# Patient Record
Sex: MALE | Race: WHITE | NOT HISPANIC OR LATINO | Employment: OTHER | URBAN - METROPOLITAN AREA
[De-identification: names, ages, dates, MRNs, and addresses within clinical notes are randomized per-mention and may not be internally consistent; named-entity substitution may affect disease eponyms.]

---

## 2023-03-16 DIAGNOSIS — R52 PAIN: ICD-10-CM

## 2023-03-16 DIAGNOSIS — E78.5 HYPERLIPIDEMIA, UNSPECIFIED HYPERLIPIDEMIA TYPE: ICD-10-CM

## 2023-03-16 DIAGNOSIS — F32.A DEPRESSION, UNSPECIFIED DEPRESSION TYPE: ICD-10-CM

## 2023-03-16 DIAGNOSIS — E11.69 TYPE 2 DIABETES MELLITUS WITH OTHER SPECIFIED COMPLICATION, WITHOUT LONG-TERM CURRENT USE OF INSULIN (MULTI): Primary | ICD-10-CM

## 2023-03-16 RX ORDER — CELECOXIB 200 MG/1
200 CAPSULE ORAL 2 TIMES DAILY
Status: CANCELLED | OUTPATIENT
Start: 2023-03-16

## 2023-03-16 RX ORDER — CITALOPRAM 40 MG/1
40 TABLET, FILM COATED ORAL DAILY
COMMUNITY
Start: 2017-11-03 | End: 2023-03-16 | Stop reason: SDUPTHER

## 2023-03-16 RX ORDER — ATORVASTATIN CALCIUM 80 MG/1
80 TABLET, FILM COATED ORAL DAILY
COMMUNITY
Start: 2020-01-30 | End: 2023-03-16 | Stop reason: SDUPTHER

## 2023-03-16 RX ORDER — EMPAGLIFLOZIN 25 MG/1
25 TABLET, FILM COATED ORAL DAILY
COMMUNITY
Start: 2020-01-30 | End: 2023-03-16 | Stop reason: SDUPTHER

## 2023-03-16 RX ORDER — METFORMIN HYDROCHLORIDE 1000 MG/1
1000 TABLET ORAL
COMMUNITY
Start: 2020-01-21 | End: 2023-03-16 | Stop reason: SDUPTHER

## 2023-03-16 RX ORDER — ACARBOSE 25 MG/1
25 TABLET ORAL
COMMUNITY
Start: 2017-10-30 | End: 2023-03-16 | Stop reason: SDUPTHER

## 2023-03-16 RX ORDER — OMEGA-3/DHA/EPA/FISH OIL 60 MG-90MG
500 CAPSULE ORAL 2 TIMES DAILY
COMMUNITY
Start: 2023-01-18 | End: 2023-03-16 | Stop reason: SDUPTHER

## 2023-03-16 RX ORDER — CELECOXIB 200 MG/1
200 CAPSULE ORAL 2 TIMES DAILY
COMMUNITY
Start: 2022-03-28 | End: 2023-03-17

## 2023-03-17 RX ORDER — METFORMIN HYDROCHLORIDE 1000 MG/1
1000 TABLET ORAL
Qty: 180 TABLET | Refills: 0 | Status: SHIPPED | OUTPATIENT
Start: 2023-03-17 | End: 2023-06-20 | Stop reason: SDUPTHER

## 2023-03-17 RX ORDER — ACARBOSE 25 MG/1
25 TABLET ORAL
Qty: 270 TABLET | Refills: 0 | Status: SHIPPED | OUTPATIENT
Start: 2023-03-17 | End: 2023-06-20 | Stop reason: SDUPTHER

## 2023-03-17 RX ORDER — CITALOPRAM 40 MG/1
40 TABLET, FILM COATED ORAL DAILY
Qty: 90 TABLET | Refills: 0 | Status: SHIPPED | OUTPATIENT
Start: 2023-03-17 | End: 2023-06-20 | Stop reason: SDUPTHER

## 2023-03-17 RX ORDER — EMPAGLIFLOZIN 25 MG/1
25 TABLET, FILM COATED ORAL DAILY
Qty: 90 TABLET | Refills: 0 | Status: SHIPPED | OUTPATIENT
Start: 2023-03-17 | End: 2023-05-09

## 2023-03-17 RX ORDER — ATORVASTATIN CALCIUM 80 MG/1
80 TABLET, FILM COATED ORAL DAILY
Qty: 90 TABLET | Refills: 0 | Status: SHIPPED | OUTPATIENT
Start: 2023-03-17 | End: 2023-06-20 | Stop reason: SDUPTHER

## 2023-03-17 RX ORDER — OMEGA-3/DHA/EPA/FISH OIL 60 MG-90MG
500 CAPSULE ORAL 2 TIMES DAILY
Qty: 180 CAPSULE | Refills: 0 | Status: SHIPPED | OUTPATIENT
Start: 2023-03-17 | End: 2023-05-09

## 2023-03-17 RX ORDER — CELECOXIB 100 MG/1
200 CAPSULE ORAL 2 TIMES DAILY PRN
Qty: 60 CAPSULE | Refills: 1 | Status: SHIPPED | OUTPATIENT
Start: 2023-03-17 | End: 2023-04-16

## 2023-04-13 ENCOUNTER — HOSPITAL ENCOUNTER (OUTPATIENT)
Dept: DATA CONVERSION | Facility: HOSPITAL | Age: 58
End: 2023-04-13
Attending: PODIATRIST | Admitting: PODIATRIST
Payer: COMMERCIAL

## 2023-04-13 DIAGNOSIS — L03.119 CELLULITIS OF UNSPECIFIED PART OF LIMB: ICD-10-CM

## 2023-04-13 DIAGNOSIS — I11.9 HYPERTENSIVE HEART DISEASE WITHOUT HEART FAILURE: ICD-10-CM

## 2023-04-13 DIAGNOSIS — D75.1 SECONDARY POLYCYTHEMIA: ICD-10-CM

## 2023-04-13 DIAGNOSIS — Z86.718 PERSONAL HISTORY OF OTHER VENOUS THROMBOSIS AND EMBOLISM: ICD-10-CM

## 2023-04-13 DIAGNOSIS — I83.90 ASYMPTOMATIC VARICOSE VEINS OF UNSPECIFIED LOWER EXTREMITY: ICD-10-CM

## 2023-04-13 DIAGNOSIS — I44.1 ATRIOVENTRICULAR BLOCK, SECOND DEGREE: ICD-10-CM

## 2023-04-13 DIAGNOSIS — R35.0 FREQUENCY OF MICTURITION: ICD-10-CM

## 2023-04-13 DIAGNOSIS — N40.1 BENIGN PROSTATIC HYPERPLASIA WITH LOWER URINARY TRACT SYMPTOMS: ICD-10-CM

## 2023-04-13 DIAGNOSIS — F17.200 NICOTINE DEPENDENCE, UNSPECIFIED, UNCOMPLICATED: ICD-10-CM

## 2023-04-13 DIAGNOSIS — E11.40 TYPE 2 DIABETES MELLITUS WITH DIABETIC NEUROPATHY, UNSPECIFIED (MULTI): ICD-10-CM

## 2023-04-13 DIAGNOSIS — R91.8 OTHER NONSPECIFIC ABNORMAL FINDING OF LUNG FIELD: ICD-10-CM

## 2023-04-13 DIAGNOSIS — J43.9 EMPHYSEMA, UNSPECIFIED (MULTI): ICD-10-CM

## 2023-04-13 DIAGNOSIS — Z86.14 PERSONAL HISTORY OF METHICILLIN RESISTANT STAPHYLOCOCCUS AUREUS INFECTION: ICD-10-CM

## 2023-04-13 DIAGNOSIS — Z79.82 LONG TERM (CURRENT) USE OF ASPIRIN: ICD-10-CM

## 2023-04-13 DIAGNOSIS — E66.9 OBESITY, UNSPECIFIED: ICD-10-CM

## 2023-04-13 DIAGNOSIS — I25.10 ATHEROSCLEROTIC HEART DISEASE OF NATIVE CORONARY ARTERY WITHOUT ANGINA PECTORIS: ICD-10-CM

## 2023-04-13 DIAGNOSIS — I49.9 CARDIAC ARRHYTHMIA, UNSPECIFIED: ICD-10-CM

## 2023-04-13 DIAGNOSIS — G47.33 OBSTRUCTIVE SLEEP APNEA (ADULT) (PEDIATRIC): ICD-10-CM

## 2023-04-13 DIAGNOSIS — S91.109A UNSPECIFIED OPEN WOUND OF UNSPECIFIED TOE(S) WITHOUT DAMAGE TO NAIL, INITIAL ENCOUNTER: ICD-10-CM

## 2023-04-13 DIAGNOSIS — I25.2 OLD MYOCARDIAL INFARCTION: ICD-10-CM

## 2023-04-13 DIAGNOSIS — Z95.810 PRESENCE OF AUTOMATIC (IMPLANTABLE) CARDIAC DEFIBRILLATOR: ICD-10-CM

## 2023-04-13 DIAGNOSIS — L03.115 CELLULITIS OF RIGHT LOWER LIMB: ICD-10-CM

## 2023-04-13 DIAGNOSIS — Z79.84 LONG TERM (CURRENT) USE OF ORAL HYPOGLYCEMIC DRUGS: ICD-10-CM

## 2023-04-13 DIAGNOSIS — I44.2 ATRIOVENTRICULAR BLOCK, COMPLETE (MULTI): ICD-10-CM

## 2023-04-13 DIAGNOSIS — F32.A DEPRESSION, UNSPECIFIED: ICD-10-CM

## 2023-04-13 DIAGNOSIS — I73.9 PERIPHERAL VASCULAR DISEASE, UNSPECIFIED (CMS-HCC): ICD-10-CM

## 2023-04-13 DIAGNOSIS — E11.65 TYPE 2 DIABETES MELLITUS WITH HYPERGLYCEMIA (MULTI): ICD-10-CM

## 2023-04-13 DIAGNOSIS — I45.10 UNSPECIFIED RIGHT BUNDLE-BRANCH BLOCK: ICD-10-CM

## 2023-04-13 LAB
ANION GAP IN SER/PLAS: 9 MMOL/L (ref 10–20)
ATRIAL RATE: 68 BPM
CALCIUM (MG/DL) IN SER/PLAS: 9.5 MG/DL (ref 8.6–10.3)
CARBON DIOXIDE, TOTAL (MMOL/L) IN SER/PLAS: 32 MMOL/L (ref 21–32)
CHLORIDE (MMOL/L) IN SER/PLAS: 104 MMOL/L (ref 98–107)
CREATININE (MG/DL) IN SER/PLAS: 0.65 MG/DL (ref 0.5–1.3)
GFR MALE: >90 ML/MIN/1.73M2
GLUCOSE (MG/DL) IN SER/PLAS: 125 MG/DL (ref 74–99)
P AXIS: 76 DEGREES
POCT GLUCOSE: 124 MG/DL (ref 74–99)
POTASSIUM (MMOL/L) IN SER/PLAS: 4.7 MMOL/L (ref 3.5–5.3)
PR INTERVAL: 243 MS
Q ONSET: 251 MS
QRS COUNT: 11 BEATS
QRS DURATION: 164 MS
QT INTERVAL: 463 MS
QTC CALCULATION(BAZETT): 493 MS
QTC FREDERICIA: 483 MS
R AXIS: 266 DEGREES
SODIUM (MMOL/L) IN SER/PLAS: 140 MMOL/L (ref 136–145)
T AXIS: 61 DEGREES
T OFFSET: 482 MS
UREA NITROGEN (MG/DL) IN SER/PLAS: 14 MG/DL (ref 6–23)
VENTRICULAR RATE: 68 BPM

## 2023-05-09 ENCOUNTER — LAB (OUTPATIENT)
Dept: LAB | Facility: LAB | Age: 58
End: 2023-05-09
Payer: COMMERCIAL

## 2023-05-09 ENCOUNTER — OFFICE VISIT (OUTPATIENT)
Dept: PRIMARY CARE | Facility: CLINIC | Age: 58
End: 2023-05-09
Payer: COMMERCIAL

## 2023-05-09 VITALS
HEART RATE: 78 BPM | DIASTOLIC BLOOD PRESSURE: 80 MMHG | OXYGEN SATURATION: 93 % | SYSTOLIC BLOOD PRESSURE: 126 MMHG | TEMPERATURE: 97.4 F | BODY MASS INDEX: 47.92 KG/M2 | WEIGHT: 315 LBS

## 2023-05-09 DIAGNOSIS — R52 BODY ACHES: ICD-10-CM

## 2023-05-09 DIAGNOSIS — R53.83 OTHER FATIGUE: ICD-10-CM

## 2023-05-09 DIAGNOSIS — M25.50 ARTHRALGIA, UNSPECIFIED JOINT: ICD-10-CM

## 2023-05-09 DIAGNOSIS — R60.9 EDEMA, UNSPECIFIED TYPE: Primary | ICD-10-CM

## 2023-05-09 DIAGNOSIS — R60.9 EDEMA, UNSPECIFIED TYPE: ICD-10-CM

## 2023-05-09 DIAGNOSIS — M79.605 PAIN OF LEFT LOWER EXTREMITY: ICD-10-CM

## 2023-05-09 PROBLEM — J30.9 ALLERGIC RHINITIS: Status: ACTIVE | Noted: 2023-05-09

## 2023-05-09 PROBLEM — F32.A DEPRESSION: Status: ACTIVE | Noted: 2023-05-09

## 2023-05-09 PROBLEM — H81.10 BENIGN PAROXYSMAL POSITIONAL VERTIGO: Status: ACTIVE | Noted: 2023-05-09

## 2023-05-09 PROBLEM — I51.7 LEFT VENTRICULAR HYPERTROPHY: Status: ACTIVE | Noted: 2023-05-09

## 2023-05-09 PROBLEM — R91.8 MULTIPLE PULMONARY NODULES: Status: ACTIVE | Noted: 2023-05-09

## 2023-05-09 PROBLEM — G47.33 OBSTRUCTIVE SLEEP APNEA: Status: ACTIVE | Noted: 2022-10-12

## 2023-05-09 PROBLEM — H81.399 OTHER PERIPHERAL VERTIGO, UNSPECIFIED EAR: Status: ACTIVE | Noted: 2023-05-09

## 2023-05-09 PROBLEM — E66.01 MORBID OBESITY (MULTI): Status: ACTIVE | Noted: 2023-05-09

## 2023-05-09 PROBLEM — E11.9 TYPE 2 DIABETES MELLITUS (MULTI): Status: ACTIVE | Noted: 2020-07-06

## 2023-05-09 PROBLEM — I10 ESSENTIAL HYPERTENSION: Status: ACTIVE | Noted: 2020-07-06

## 2023-05-09 PROBLEM — H90.3 SENSORINEURAL HEARING LOSS, ASYMMETRICAL: Status: ACTIVE | Noted: 2023-05-09

## 2023-05-09 PROBLEM — M19.90 OSTEOARTHRITIS: Status: ACTIVE | Noted: 2023-05-09

## 2023-05-09 PROBLEM — E78.5 HYPERLIPIDEMIA: Status: ACTIVE | Noted: 2020-07-06

## 2023-05-09 PROBLEM — R42 VERTIGO: Status: ACTIVE | Noted: 2023-05-09

## 2023-05-09 PROBLEM — Z95.0 STATUS CARDIAC PACEMAKER: Status: ACTIVE | Noted: 2023-05-09

## 2023-05-09 PROBLEM — R06.2 WHEEZING ON AUSCULTATION: Status: ACTIVE | Noted: 2023-05-09

## 2023-05-09 PROBLEM — J44.9 COPD (CHRONIC OBSTRUCTIVE PULMONARY DISEASE) (MULTI): Status: ACTIVE | Noted: 2022-06-27

## 2023-05-09 PROBLEM — F17.200 MODERATE TOBACCO USE DISORDER: Status: ACTIVE | Noted: 2023-05-09

## 2023-05-09 PROBLEM — F41.1 ANXIETY STATE: Status: ACTIVE | Noted: 2021-10-25

## 2023-05-09 PROBLEM — M79.89 LEG SWELLING: Status: ACTIVE | Noted: 2023-05-09

## 2023-05-09 PROBLEM — R35.1 NOCTURIA: Status: ACTIVE | Noted: 2023-05-09

## 2023-05-09 LAB
ALANINE AMINOTRANSFERASE (SGPT) (U/L) IN SER/PLAS: 26 U/L (ref 10–52)
ALBUMIN (G/DL) IN SER/PLAS: 4.3 G/DL (ref 3.4–5)
ALKALINE PHOSPHATASE (U/L) IN SER/PLAS: 110 U/L (ref 33–120)
ANION GAP IN SER/PLAS: 10 MMOL/L (ref 10–20)
ASPARTATE AMINOTRANSFERASE (SGOT) (U/L) IN SER/PLAS: 17 U/L (ref 9–39)
BASOPHILS (10*3/UL) IN BLOOD BY AUTOMATED COUNT: 0.05 X10E9/L (ref 0–0.1)
BASOPHILS/100 LEUKOCYTES IN BLOOD BY AUTOMATED COUNT: 0.6 % (ref 0–2)
BILIRUBIN TOTAL (MG/DL) IN SER/PLAS: 1.3 MG/DL (ref 0–1.2)
CALCIUM (MG/DL) IN SER/PLAS: 9.8 MG/DL (ref 8.6–10.3)
CARBON DIOXIDE, TOTAL (MMOL/L) IN SER/PLAS: 33 MMOL/L (ref 21–32)
CHLORIDE (MMOL/L) IN SER/PLAS: 101 MMOL/L (ref 98–107)
CREATINE KINASE (U/L) IN SER/PLAS: 100 U/L (ref 0–325)
CREATININE (MG/DL) IN SER/PLAS: 0.69 MG/DL (ref 0.5–1.3)
EOSINOPHILS (10*3/UL) IN BLOOD BY AUTOMATED COUNT: 0.21 X10E9/L (ref 0–0.7)
EOSINOPHILS/100 LEUKOCYTES IN BLOOD BY AUTOMATED COUNT: 2.6 % (ref 0–6)
ERYTHROCYTE DISTRIBUTION WIDTH (RATIO) BY AUTOMATED COUNT: 15.7 % (ref 11.5–14.5)
ERYTHROCYTE MEAN CORPUSCULAR HEMOGLOBIN CONCENTRATION (G/DL) BY AUTOMATED: 31.7 G/DL (ref 32–36)
ERYTHROCYTE MEAN CORPUSCULAR VOLUME (FL) BY AUTOMATED COUNT: 91 FL (ref 80–100)
ERYTHROCYTES (10*6/UL) IN BLOOD BY AUTOMATED COUNT: 5.83 X10E12/L (ref 4.5–5.9)
GFR MALE: >90 ML/MIN/1.73M2
GLUCOSE (MG/DL) IN SER/PLAS: 120 MG/DL (ref 74–99)
HEMATOCRIT (%) IN BLOOD BY AUTOMATED COUNT: 53.3 % (ref 41–52)
HEMOGLOBIN (G/DL) IN BLOOD: 16.9 G/DL (ref 13.5–17.5)
IMMATURE GRANULOCYTES/100 LEUKOCYTES IN BLOOD BY AUTOMATED COUNT: 0.4 % (ref 0–0.9)
LEUKOCYTES (10*3/UL) IN BLOOD BY AUTOMATED COUNT: 8.2 X10E9/L (ref 4.4–11.3)
LYMPHOCYTES (10*3/UL) IN BLOOD BY AUTOMATED COUNT: 1.09 X10E9/L (ref 1.2–4.8)
LYMPHOCYTES/100 LEUKOCYTES IN BLOOD BY AUTOMATED COUNT: 13.3 % (ref 13–44)
MONOCYTES (10*3/UL) IN BLOOD BY AUTOMATED COUNT: 0.81 X10E9/L (ref 0.1–1)
MONOCYTES/100 LEUKOCYTES IN BLOOD BY AUTOMATED COUNT: 9.9 % (ref 2–10)
NEUTROPHILS (10*3/UL) IN BLOOD BY AUTOMATED COUNT: 6.03 X10E9/L (ref 1.2–7.7)
NEUTROPHILS/100 LEUKOCYTES IN BLOOD BY AUTOMATED COUNT: 73.2 % (ref 40–80)
PLATELETS (10*3/UL) IN BLOOD AUTOMATED COUNT: 168 X10E9/L (ref 150–450)
POTASSIUM (MMOL/L) IN SER/PLAS: 4.3 MMOL/L (ref 3.5–5.3)
PROSTATE SPECIFIC AG (NG/ML) IN SER/PLAS: 0.22 NG/ML (ref 0–4)
PROTEIN TOTAL: 7.3 G/DL (ref 6.4–8.2)
RHEUMATOID FACTOR (IU/ML) IN SERUM OR PLASMA: <10 IU/ML (ref 0–15)
SEDIMENTATION RATE, ERYTHROCYTE: 15 MM/H (ref 0–20)
SODIUM (MMOL/L) IN SER/PLAS: 140 MMOL/L (ref 136–145)
THYROTROPIN (MIU/L) IN SER/PLAS BY DETECTION LIMIT <= 0.05 MIU/L: 3.23 MIU/L (ref 0.44–3.98)
URATE (MG/DL) IN SER/PLAS: 5.8 MG/DL (ref 4–7.5)
UREA NITROGEN (MG/DL) IN SER/PLAS: 16 MG/DL (ref 6–23)

## 2023-05-09 PROCEDURE — 4010F ACE/ARB THERAPY RXD/TAKEN: CPT | Performed by: FAMILY MEDICINE

## 2023-05-09 PROCEDURE — 86431 RHEUMATOID FACTOR QUANT: CPT

## 2023-05-09 PROCEDURE — 86038 ANTINUCLEAR ANTIBODIES: CPT

## 2023-05-09 PROCEDURE — 82607 VITAMIN B-12: CPT

## 2023-05-09 PROCEDURE — 84443 ASSAY THYROID STIM HORMONE: CPT

## 2023-05-09 PROCEDURE — 82550 ASSAY OF CK (CPK): CPT

## 2023-05-09 PROCEDURE — 3079F DIAST BP 80-89 MM HG: CPT | Performed by: FAMILY MEDICINE

## 2023-05-09 PROCEDURE — 86235 NUCLEAR ANTIGEN ANTIBODY: CPT

## 2023-05-09 PROCEDURE — 36415 COLL VENOUS BLD VENIPUNCTURE: CPT

## 2023-05-09 PROCEDURE — 3008F BODY MASS INDEX DOCD: CPT | Performed by: FAMILY MEDICINE

## 2023-05-09 PROCEDURE — 99214 OFFICE O/P EST MOD 30 MIN: CPT | Performed by: FAMILY MEDICINE

## 2023-05-09 PROCEDURE — 3074F SYST BP LT 130 MM HG: CPT | Performed by: FAMILY MEDICINE

## 2023-05-09 PROCEDURE — 86039 ANTINUCLEAR ANTIBODIES (ANA): CPT

## 2023-05-09 PROCEDURE — 86225 DNA ANTIBODY NATIVE: CPT

## 2023-05-09 RX ORDER — OXYCODONE AND ACETAMINOPHEN 5; 325 MG/1; MG/1
TABLET ORAL
COMMUNITY
Start: 2023-04-13 | End: 2023-11-06 | Stop reason: ALTCHOICE

## 2023-05-09 RX ORDER — GLIPIZIDE 5 MG/1
TABLET ORAL
COMMUNITY
Start: 2020-01-30 | End: 2023-05-24 | Stop reason: SDUPTHER

## 2023-05-09 RX ORDER — LISINOPRIL 10 MG/1
1 TABLET ORAL DAILY
COMMUNITY
Start: 2017-10-30 | End: 2023-11-02 | Stop reason: WASHOUT

## 2023-05-09 RX ORDER — CEFADROXIL 500 MG/1
500 CAPSULE ORAL EVERY 12 HOURS
COMMUNITY
Start: 2023-05-04 | End: 2023-06-20 | Stop reason: ALTCHOICE

## 2023-05-09 RX ORDER — ALBUTEROL SULFATE 90 UG/1
AEROSOL, METERED RESPIRATORY (INHALATION)
COMMUNITY
Start: 2022-06-27 | End: 2023-11-02 | Stop reason: WASHOUT

## 2023-05-09 RX ORDER — ASPIRIN 81 MG/1
1 TABLET ORAL DAILY
COMMUNITY
Start: 2020-04-28 | End: 2023-11-02 | Stop reason: WASHOUT

## 2023-05-09 RX ORDER — SEMAGLUTIDE 1.34 MG/ML
INJECTION, SOLUTION SUBCUTANEOUS
COMMUNITY
Start: 2023-04-15 | End: 2023-07-03 | Stop reason: SDUPTHER

## 2023-05-09 RX ORDER — MECLIZINE HYDROCHLORIDE 25 MG/1
TABLET ORAL
COMMUNITY
Start: 2022-08-08 | End: 2023-06-20

## 2023-05-09 RX ORDER — DOCUSATE SODIUM 100 MG/1
CAPSULE, LIQUID FILLED ORAL
COMMUNITY
Start: 2022-12-02 | End: 2023-11-02 | Stop reason: WASHOUT

## 2023-05-09 RX ORDER — CELECOXIB 100 MG/1
CAPSULE ORAL
COMMUNITY
Start: 2023-04-17 | End: 2023-05-24 | Stop reason: SDUPTHER

## 2023-05-09 RX ORDER — TIOTROPIUM BROMIDE AND OLODATEROL 3.124; 2.736 UG/1; UG/1
SPRAY, METERED RESPIRATORY (INHALATION)
COMMUNITY
Start: 2020-05-18 | End: 2024-03-22 | Stop reason: WASHOUT

## 2023-05-09 RX ORDER — AMMONIUM LACTATE 12 G/100G
LOTION TOPICAL
COMMUNITY
Start: 2023-04-17

## 2023-05-09 NOTE — PROGRESS NOTES
Subjective   Patient ID: Morro Seo is a 57 y.o. male who presents for Leg Swelling (Bilateral leg swelling and pain) and Joint Swelling (All over body x 5 months).    HPI   Edema: Chronic and recurrent.  Worsening since had foot surgery on the right.  Denies salt intake.  Worse on the left leg.  No recent travel.  On antibiotics for possible cellulitis.    Joint pains/body aches: Chronic and recurrent.  As needed OTC meds minimally helping. Has bw ordered from podiatry    Fatigue: Chronically has no energy      Review of Systems   Constitutional:  Positive for fatigue.   HENT: Negative.     Eyes:  Negative for visual disturbance.   Respiratory:  Negative for shortness of breath.    Cardiovascular:  Negative for chest pain and palpitations.   Gastrointestinal:  Negative for abdominal pain, blood in stool, constipation, diarrhea, nausea and vomiting.   Endocrine: Negative for polydipsia, polyphagia and polyuria.   Genitourinary:  Negative for difficulty urinating and dysuria.   Musculoskeletal:         As above   Skin:  Negative for rash.   Neurological:  Positive for dizziness. Negative for headaches.   Psychiatric/Behavioral:  Negative for dysphoric mood and sleep disturbance.        Objective   /80   Pulse 78   Temp 36.3 °C (97.4 °F)   Wt (!) 152 kg (334 lb)   SpO2 93%   BMI 47.92 kg/m²     Physical Exam  Vitals and nursing note reviewed.   Constitutional:       General: He is not in acute distress.     Appearance: Normal appearance. He is not toxic-appearing or diaphoretic.   HENT:      Head: Normocephalic.   Eyes:      General: No scleral icterus.     Pupils: Pupils are equal, round, and reactive to light.   Cardiovascular:      Rate and Rhythm: Normal rate and regular rhythm.      Pulses: Normal pulses.      Heart sounds: No murmur heard.  Pulmonary:      Effort: Pulmonary effort is normal. No respiratory distress.      Breath sounds: Normal breath sounds.   Abdominal:      General: Bowel sounds  are normal.      Palpations: Abdomen is soft.      Tenderness: There is no abdominal tenderness. There is no guarding.   Musculoskeletal:      Cervical back: Neck supple.      Right lower leg: Edema present.      Left lower leg: Edema present.      Comments: Edema worse left leg   Skin:     General: Skin is warm.   Neurological:      General: No focal deficit present.      Mental Status: He is alert.      Cranial Nerves: No cranial nerve deficit.   Psychiatric:         Mood and Affect: Mood normal.         Assessment/Plan   Problem List Items Addressed This Visit    None  Visit Diagnoses       Edema, unspecified type    -  Primary    Relevant Orders    TSH with reflex to Free T4 if abnormal (Completed)    Lower extremity venous duplex left    Body aches        Relevant Orders    CK (Completed)    Rheumatoid factor (Completed)    KJ with Reflex to LIZZY    Arthralgia, unspecified joint        Relevant Orders    Rheumatoid factor (Completed)    KJ with Reflex to LIZZY    Other fatigue        Relevant Orders    TSH with reflex to Free T4 if abnormal (Completed)    Vitamin B12    Pain of left lower extremity        Relevant Orders    Lower extremity venous duplex left

## 2023-05-09 NOTE — PATIENT INSTRUCTIONS
You were referred for US of your left leg and blood work. Obtain your blood work from the podiatrist    Go to the ER if any of your symptoms are significantly worsening.     Recommend low salt diet.     Finish your antibiotics.    Contact your cardiologist to see if you need repeat echo or water pill    Follow up as scheduled, sooner if needed

## 2023-05-10 ENCOUNTER — TELEPHONE (OUTPATIENT)
Dept: PRIMARY CARE | Facility: CLINIC | Age: 58
End: 2023-05-10
Payer: COMMERCIAL

## 2023-05-10 LAB
COBALAMIN (VITAMIN B12) (PG/ML) IN SER/PLAS: 512 PG/ML (ref 211–911)
ESTIMATED AVERAGE GLUCOSE FOR HBA1C: 166 MG/DL
HEMOGLOBIN A1C/HEMOGLOBIN TOTAL IN BLOOD: 7.4 %
NATRIURETIC PEPTIDE B (PG/ML) IN SER/PLAS: 26 PG/ML (ref 0–99)

## 2023-05-10 ASSESSMENT — ENCOUNTER SYMPTOMS
VOMITING: 0
DIFFICULTY URINATING: 0
DIARRHEA: 0
SHORTNESS OF BREATH: 0
NAUSEA: 0
HEADACHES: 0
FATIGUE: 1
POLYPHAGIA: 0
POLYDIPSIA: 0
PALPITATIONS: 0
SLEEP DISTURBANCE: 0
DYSPHORIC MOOD: 0
ABDOMINAL PAIN: 0
CONSTIPATION: 0
DYSURIA: 0
DIZZINESS: 1
BLOOD IN STOOL: 0

## 2023-05-10 NOTE — TELEPHONE ENCOUNTER
----- Message from Lisseth Thomas DO sent at 5/10/2023 12:12 AM EDT -----  Pls tell pt that his venous duplex was neg

## 2023-05-11 LAB
ANA PATTERN: ABNORMAL
ANA TITER: ABNORMAL
ANTI-NUCLEAR ANTIBODY (ANA): POSITIVE

## 2023-05-12 LAB
ANTI-CENTROMERE: <0.2 AI
ANTI-CHROMATIN: <0.2 AI
ANTI-DNA (DS): <1 IU/ML
ANTI-JO-1 IGG: <0.2 AI
ANTI-RIBOSOMAL P: <0.2 AI
ANTI-RNP: <0.2 AI
ANTI-SCL-70: <0.2 AI
ANTI-SM/RNP: <0.2 AI
ANTI-SM: <0.2 AI
ANTI-SSA: <0.2 AI
ANTI-SSB: <0.2 AI

## 2023-05-20 DIAGNOSIS — R52 PAIN, UNSPECIFIED: ICD-10-CM

## 2023-05-22 RX ORDER — CELECOXIB 100 MG/1
CAPSULE ORAL
Qty: 60 CAPSULE | Refills: 1 | OUTPATIENT
Start: 2023-05-22

## 2023-05-24 DIAGNOSIS — M19.90 OSTEOARTHRITIS, UNSPECIFIED OSTEOARTHRITIS TYPE, UNSPECIFIED SITE: Primary | ICD-10-CM

## 2023-05-24 DIAGNOSIS — E11.69 TYPE 2 DIABETES MELLITUS WITH OTHER SPECIFIED COMPLICATION, UNSPECIFIED WHETHER LONG TERM INSULIN USE (MULTI): ICD-10-CM

## 2023-05-24 NOTE — TELEPHONE ENCOUNTER
Next ov 6/12  Pt compliant  Pt OUT  [facility]    Last rx for celbrex sent 10/3/22 via [facility] for 6mo  Last rx for glipizide sent 6/27/22 via [facility] for 6 mo

## 2023-05-25 LAB
ALANINE AMINOTRANSFERASE (SGPT) (U/L) IN SER/PLAS: 35 U/L (ref 10–52)
ALBUMIN (G/DL) IN SER/PLAS: 4.5 G/DL (ref 3.4–5)
ALKALINE PHOSPHATASE (U/L) IN SER/PLAS: 114 U/L (ref 33–120)
ANION GAP IN SER/PLAS: 12 MMOL/L (ref 10–20)
ASPARTATE AMINOTRANSFERASE (SGOT) (U/L) IN SER/PLAS: 25 U/L (ref 9–39)
BILIRUBIN TOTAL (MG/DL) IN SER/PLAS: 1.4 MG/DL (ref 0–1.2)
CALCIUM (MG/DL) IN SER/PLAS: 10.2 MG/DL (ref 8.6–10.3)
CARBON DIOXIDE, TOTAL (MMOL/L) IN SER/PLAS: 34 MMOL/L (ref 21–32)
CHLORIDE (MMOL/L) IN SER/PLAS: 99 MMOL/L (ref 98–107)
CREATININE (MG/DL) IN SER/PLAS: 0.81 MG/DL (ref 0.5–1.3)
GFR MALE: >90 ML/MIN/1.73M2
GLUCOSE (MG/DL) IN SER/PLAS: 88 MG/DL (ref 74–99)
MAGNESIUM (MG/DL) IN SER/PLAS: 1.72 MG/DL (ref 1.6–2.4)
POTASSIUM (MMOL/L) IN SER/PLAS: 4.6 MMOL/L (ref 3.5–5.3)
PROTEIN TOTAL: 7.4 G/DL (ref 6.4–8.2)
SODIUM (MMOL/L) IN SER/PLAS: 140 MMOL/L (ref 136–145)
UREA NITROGEN (MG/DL) IN SER/PLAS: 25 MG/DL (ref 6–23)

## 2023-05-25 RX ORDER — GLIPIZIDE 5 MG/1
5 TABLET ORAL
Qty: 180 TABLET | Refills: 0 | Status: SHIPPED | OUTPATIENT
Start: 2023-05-25 | End: 2023-09-01

## 2023-05-25 RX ORDER — CELECOXIB 100 MG/1
CAPSULE ORAL
Qty: 90 CAPSULE | Refills: 0 | Status: SHIPPED | OUTPATIENT
Start: 2023-05-25 | End: 2023-06-21

## 2023-06-12 ENCOUNTER — APPOINTMENT (OUTPATIENT)
Dept: PRIMARY CARE | Facility: CLINIC | Age: 58
End: 2023-06-12
Payer: COMMERCIAL

## 2023-06-15 DIAGNOSIS — F32.A DEPRESSION, UNSPECIFIED DEPRESSION TYPE: ICD-10-CM

## 2023-06-15 NOTE — TELEPHONE ENCOUNTER
Pharmacy Request  Next OV 6/20/23  Pt should have enough until appt. Please refuse med, thanks. AM

## 2023-06-16 DIAGNOSIS — E11.69 TYPE 2 DIABETES MELLITUS WITH OTHER SPECIFIED COMPLICATION, WITHOUT LONG-TERM CURRENT USE OF INSULIN (MULTI): ICD-10-CM

## 2023-06-16 DIAGNOSIS — E78.5 HYPERLIPIDEMIA, UNSPECIFIED HYPERLIPIDEMIA TYPE: ICD-10-CM

## 2023-06-16 NOTE — TELEPHONE ENCOUNTER
Pharmacy Request  Looks like pt would be due for refills but next OV 6/20/23. Ok for refills or wait until appt? Please advise, AM

## 2023-06-19 DIAGNOSIS — M19.90 OSTEOARTHRITIS, UNSPECIFIED OSTEOARTHRITIS TYPE, UNSPECIFIED SITE: ICD-10-CM

## 2023-06-20 ENCOUNTER — OFFICE VISIT (OUTPATIENT)
Dept: PRIMARY CARE | Facility: CLINIC | Age: 58
End: 2023-06-20
Payer: COMMERCIAL

## 2023-06-20 VITALS
HEART RATE: 88 BPM | BODY MASS INDEX: 47.06 KG/M2 | DIASTOLIC BLOOD PRESSURE: 70 MMHG | WEIGHT: 315 LBS | OXYGEN SATURATION: 93 % | SYSTOLIC BLOOD PRESSURE: 124 MMHG | TEMPERATURE: 97.5 F

## 2023-06-20 DIAGNOSIS — E11.69 TYPE 2 DIABETES MELLITUS WITH OTHER SPECIFIED COMPLICATION, WITHOUT LONG-TERM CURRENT USE OF INSULIN (MULTI): ICD-10-CM

## 2023-06-20 DIAGNOSIS — G47.33 OBSTRUCTIVE SLEEP APNEA: Primary | ICD-10-CM

## 2023-06-20 DIAGNOSIS — F41.1 ANXIETY STATE: ICD-10-CM

## 2023-06-20 DIAGNOSIS — F32.A DEPRESSION, UNSPECIFIED DEPRESSION TYPE: ICD-10-CM

## 2023-06-20 DIAGNOSIS — M25.50 ARTHRALGIA, UNSPECIFIED JOINT: ICD-10-CM

## 2023-06-20 DIAGNOSIS — R16.0 HEPATOMEGALY: ICD-10-CM

## 2023-06-20 DIAGNOSIS — I10 ESSENTIAL HYPERTENSION: ICD-10-CM

## 2023-06-20 DIAGNOSIS — E78.5 HYPERLIPIDEMIA, UNSPECIFIED HYPERLIPIDEMIA TYPE: ICD-10-CM

## 2023-06-20 PROCEDURE — 3074F SYST BP LT 130 MM HG: CPT | Performed by: FAMILY MEDICINE

## 2023-06-20 PROCEDURE — 4010F ACE/ARB THERAPY RXD/TAKEN: CPT | Performed by: FAMILY MEDICINE

## 2023-06-20 PROCEDURE — 3008F BODY MASS INDEX DOCD: CPT | Performed by: FAMILY MEDICINE

## 2023-06-20 PROCEDURE — 3078F DIAST BP <80 MM HG: CPT | Performed by: FAMILY MEDICINE

## 2023-06-20 PROCEDURE — 99214 OFFICE O/P EST MOD 30 MIN: CPT | Performed by: FAMILY MEDICINE

## 2023-06-20 PROCEDURE — 3051F HG A1C>EQUAL 7.0%<8.0%: CPT | Performed by: FAMILY MEDICINE

## 2023-06-20 RX ORDER — ATORVASTATIN CALCIUM 80 MG/1
80 TABLET, FILM COATED ORAL DAILY
Qty: 90 TABLET | Refills: 0 | Status: SHIPPED | OUTPATIENT
Start: 2023-06-20 | End: 2023-09-21

## 2023-06-20 RX ORDER — ACARBOSE 25 MG/1
25 TABLET ORAL
Qty: 270 TABLET | Refills: 0 | Status: SHIPPED | OUTPATIENT
Start: 2023-06-20 | End: 2023-09-21

## 2023-06-20 RX ORDER — FUROSEMIDE 10 MG/ML
SOLUTION ORAL DAILY
COMMUNITY
End: 2023-11-02 | Stop reason: WASHOUT

## 2023-06-20 RX ORDER — METFORMIN HYDROCHLORIDE 1000 MG/1
1000 TABLET ORAL
Qty: 180 TABLET | Refills: 0 | Status: SHIPPED | OUTPATIENT
Start: 2023-06-20 | End: 2023-09-21

## 2023-06-20 RX ORDER — CITALOPRAM 40 MG/1
40 TABLET, FILM COATED ORAL DAILY
Qty: 90 TABLET | Refills: 0 | Status: SHIPPED | OUTPATIENT
Start: 2023-06-20 | End: 2023-09-20

## 2023-06-20 NOTE — PATIENT INSTRUCTIONS
Recommend a predominant whole foods plant based diet.  Cut back on meat, dairy, salt and oils. Increase fiber in your diet.  Decrease alcohol as much as possible if you drink. Recommend regular exercise most days of the week.    Follow up with your specialists as scheduled    Continue your current meds    You were referred to sleep med    Return in 3 months, sooner if needed    You referred for US liver

## 2023-06-20 NOTE — PROGRESS NOTES
Subjective   Patient ID: Morro Seo is a 58 y.o. male who presents for Diabetes, Anxiety, and Hyperlipidemia (Recheck.)  And multiple issues    HPI   DM: Stable.  A1c 7.4%  Lipids: Have been stable  Joint pains: Hurts all over.  Has trouble moving around at home.  Asking for disability placard.  Does not want to see rheumatology.  KJ positive but reflex negative  Hypertension: Stable  NAVYA: Using machine but still tired.  Has little energy.  Hepatomegaly: Felt on exam today although difficult to assess due to body habitus.  No tenderness to palpation.    Saw cardiology since last office visit told heart working well.  Was placed on water pill.  Slightly helping with edema    Review of Systems   Constitutional:  Positive for fatigue.   HENT: Negative.     Eyes:  Negative for visual disturbance.   Respiratory:  Positive for shortness of breath.    Cardiovascular:  Negative for chest pain and palpitations.   Gastrointestinal:  Negative for abdominal pain, blood in stool, constipation, diarrhea, nausea and vomiting.   Endocrine: Negative for cold intolerance, heat intolerance, polydipsia, polyphagia and polyuria.   Genitourinary:  Negative for difficulty urinating and dysuria.   Musculoskeletal:         As above   Skin:  Negative for rash.   Neurological:  Negative for dizziness and headaches.   Psychiatric/Behavioral:  Positive for sleep disturbance.          Objective   /70   Pulse 88   Temp 36.4 °C (97.5 °F)   Wt 149 kg (328 lb)   SpO2 93%   BMI 47.06 kg/m²     Physical Exam    Assessment/Plan   Problem List Items Addressed This Visit          Nervous    Obstructive sleep apnea - Primary    Relevant Orders    Referral to Adult Sleep Medicine       Circulatory    Essential hypertension    Relevant Orders    Comprehensive Metabolic Panel       Endocrine/Metabolic    Type 2 diabetes mellitus (CMS/HCC)    Relevant Medications    metFORMIN (Glucophage) 1,000 mg tablet    atorvastatin (Lipitor) 80 mg tablet  "   acarbose (Precose) 25 mg tablet    Other Relevant Orders    Hemoglobin A1C       Other    Hyperlipidemia    Relevant Medications    atorvastatin (Lipitor) 80 mg tablet    Other Relevant Orders    Lipid Panel    Anxiety state    Depression    Relevant Medications    citalopram (CeleXA) 40 mg tablet     Other Visit Diagnoses       Arthralgia, unspecified joint            Reviewed blood work.  Declines rheumatology referral. \"Does not want to see another doctor.\" Did refer to sleep medicine.  May need BiPAP.       "

## 2023-06-21 RX ORDER — CELECOXIB 100 MG/1
CAPSULE ORAL
Qty: 90 CAPSULE | Refills: 0 | Status: SHIPPED | OUTPATIENT
Start: 2023-06-21 | End: 2023-07-19

## 2023-06-21 RX ORDER — SEMAGLUTIDE 1.34 MG/ML
INJECTION, SOLUTION SUBCUTANEOUS
Refills: 5 | OUTPATIENT
Start: 2023-06-21

## 2023-06-21 RX ORDER — CITALOPRAM 40 MG/1
TABLET, FILM COATED ORAL
Qty: 90 TABLET | Refills: 0 | OUTPATIENT
Start: 2023-06-21

## 2023-06-21 ASSESSMENT — ENCOUNTER SYMPTOMS
HEADACHES: 0
DYSURIA: 0
POLYDIPSIA: 0
POLYPHAGIA: 0
FATIGUE: 1
SHORTNESS OF BREATH: 1
SLEEP DISTURBANCE: 1
DIFFICULTY URINATING: 0
BLOOD IN STOOL: 0
PALPITATIONS: 0
DIARRHEA: 0
VOMITING: 0
CONSTIPATION: 0
NAUSEA: 0
DIZZINESS: 0
ABDOMINAL PAIN: 0

## 2023-06-29 RX ORDER — ACARBOSE 25 MG/1
TABLET ORAL
Qty: 270 TABLET | Refills: 0 | OUTPATIENT
Start: 2023-06-29

## 2023-06-29 RX ORDER — METFORMIN HYDROCHLORIDE 1000 MG/1
TABLET ORAL
Qty: 180 TABLET | Refills: 0 | OUTPATIENT
Start: 2023-06-29

## 2023-06-29 RX ORDER — ATORVASTATIN CALCIUM 80 MG/1
TABLET, FILM COATED ORAL
Qty: 90 TABLET | Refills: 0 | OUTPATIENT
Start: 2023-06-29

## 2023-07-03 ENCOUNTER — TELEPHONE (OUTPATIENT)
Dept: PRIMARY CARE | Facility: CLINIC | Age: 58
End: 2023-07-03
Payer: COMMERCIAL

## 2023-07-03 RX ORDER — SEMAGLUTIDE 1.34 MG/ML
1 INJECTION, SOLUTION SUBCUTANEOUS
Qty: 3 ML | Refills: 2 | Status: SHIPPED | OUTPATIENT
Start: 2023-07-03 | End: 2023-11-06 | Stop reason: SDUPTHER

## 2023-07-03 NOTE — TELEPHONE ENCOUNTER
Pt called requesting pended med, not victoza.    Pt OUT  Last rx sent via vm 12/19/22  [facility]  Next OV 9/13- IEL

## 2023-07-03 NOTE — RESULT ENCOUNTER NOTE
Please let patient know that his ultrasound showed enlarged liver most likely from fatty liver.  He also had a small gallbladder polyp.  Losing weight would help his liver to reduce the risk of future cirrhosis.

## 2023-07-03 NOTE — TELEPHONE ENCOUNTER
----- Message from Andre Mendiola MD sent at 7/3/2023  2:10 PM EDT -----  Please let patient know that his ultrasound showed enlarged liver most likely from fatty liver.  He also had a small gallbladder polyp.  Losing weight would help his liver to reduce the risk of future cirrhosis.

## 2023-07-10 ENCOUNTER — TELEPHONE (OUTPATIENT)
Dept: PRIMARY CARE | Facility: CLINIC | Age: 58
End: 2023-07-10
Payer: COMMERCIAL

## 2023-07-12 DIAGNOSIS — R52 BODY ACHES: Primary | ICD-10-CM

## 2023-07-15 DIAGNOSIS — M19.90 OSTEOARTHRITIS, UNSPECIFIED OSTEOARTHRITIS TYPE, UNSPECIFIED SITE: ICD-10-CM

## 2023-07-19 RX ORDER — CELECOXIB 100 MG/1
CAPSULE ORAL
Qty: 90 CAPSULE | Refills: 0 | Status: SHIPPED | OUTPATIENT
Start: 2023-07-19 | End: 2023-08-10 | Stop reason: SDUPTHER

## 2023-08-04 LAB
ANION GAP IN SER/PLAS: 12 MMOL/L (ref 10–20)
CALCIUM (MG/DL) IN SER/PLAS: 9.7 MG/DL (ref 8.6–10.3)
CARBON DIOXIDE, TOTAL (MMOL/L) IN SER/PLAS: 34 MMOL/L (ref 21–32)
CHLORIDE (MMOL/L) IN SER/PLAS: 100 MMOL/L (ref 98–107)
CREATININE (MG/DL) IN SER/PLAS: 0.73 MG/DL (ref 0.5–1.3)
GFR MALE: >90 ML/MIN/1.73M2
GLUCOSE (MG/DL) IN SER/PLAS: 144 MG/DL (ref 74–99)
MAGNESIUM (MG/DL) IN SER/PLAS: 1.74 MG/DL (ref 1.6–2.4)
NATRIURETIC PEPTIDE B (PG/ML) IN SER/PLAS: 11 PG/ML (ref 0–99)
POTASSIUM (MMOL/L) IN SER/PLAS: 4.7 MMOL/L (ref 3.5–5.3)
SODIUM (MMOL/L) IN SER/PLAS: 141 MMOL/L (ref 136–145)
UREA NITROGEN (MG/DL) IN SER/PLAS: 15 MG/DL (ref 6–23)

## 2023-08-07 DIAGNOSIS — M19.90 OSTEOARTHRITIS, UNSPECIFIED OSTEOARTHRITIS TYPE, UNSPECIFIED SITE: ICD-10-CM

## 2023-08-08 RX ORDER — CELECOXIB 100 MG/1
CAPSULE ORAL
Qty: 90 CAPSULE | Refills: 0 | OUTPATIENT
Start: 2023-08-08

## 2023-08-10 DIAGNOSIS — M19.90 OSTEOARTHRITIS, UNSPECIFIED OSTEOARTHRITIS TYPE, UNSPECIFIED SITE: ICD-10-CM

## 2023-08-10 RX ORDER — CELECOXIB 100 MG/1
CAPSULE ORAL
Qty: 90 CAPSULE | Refills: 0 | Status: SHIPPED | OUTPATIENT
Start: 2023-08-10 | End: 2023-11-02 | Stop reason: WASHOUT

## 2023-08-10 NOTE — TELEPHONE ENCOUNTER
Pt called requesting pended med    Next ov 9/13  Last rx sent 7/19 - 90d  Rx was refused 8/8, via pharmacy request, which [place] refills.  Pt is OUT of meds  [facility] Thx

## 2023-08-26 DIAGNOSIS — E11.69 TYPE 2 DIABETES MELLITUS WITH OTHER SPECIFIED COMPLICATION, UNSPECIFIED WHETHER LONG TERM INSULIN USE (MULTI): ICD-10-CM

## 2023-09-01 RX ORDER — GLIPIZIDE 5 MG/1
TABLET ORAL
Qty: 180 TABLET | Refills: 0 | Status: SHIPPED | OUTPATIENT
Start: 2023-09-01 | End: 2023-11-06

## 2023-09-07 VITALS — HEIGHT: 70 IN | WEIGHT: 302.03 LBS | BODY MASS INDEX: 43.24 KG/M2

## 2023-09-13 ENCOUNTER — APPOINTMENT (OUTPATIENT)
Dept: PRIMARY CARE | Facility: CLINIC | Age: 58
End: 2023-09-13
Payer: COMMERCIAL

## 2023-09-13 ENCOUNTER — TELEPHONE (OUTPATIENT)
Dept: PRIMARY CARE | Facility: CLINIC | Age: 58
End: 2023-09-13

## 2023-09-13 DIAGNOSIS — R52 BODY ACHES: Primary | ICD-10-CM

## 2023-09-13 RX ORDER — CELECOXIB 200 MG/1
200 CAPSULE ORAL 2 TIMES DAILY
COMMUNITY
End: 2023-09-13 | Stop reason: SDUPTHER

## 2023-09-13 RX ORDER — CELECOXIB 200 MG/1
200 CAPSULE ORAL 2 TIMES DAILY
Qty: 180 CAPSULE | Refills: 1 | Status: SHIPPED | OUTPATIENT
Start: 2023-09-13 | End: 2024-02-29

## 2023-09-13 NOTE — TELEPHONE ENCOUNTER
Due to IEL out of the office patient states his   celecoxib (CeleBREX) 100 mg capsule  Rx is wrong needs to be 200 mg in AM and 200 mg at night. Needs [facility]

## 2023-09-14 NOTE — H&P
History & Physical Reviewed:   I have reviewed the History and Physical dated:  10-Apr-2023   History and Physical reviewed and relevant findings noted. Patient examined to review pertinent physical  findings.: No significant changes   Home Medications Reviewed: no changes noted   Allergies Reviewed: no changes noted       ERAS (Enhanced Recovery After Surgery):  ·  ERAS Patient: no     Consent:   COVID-19 Consent:  ·  COVID-19 Risk Consent Surgeon has reviewed key risks related to the risk of raphael COVID-19 and if they contract COVID-19 what the risks are.     Attestation:   Note Completion:        Electronic Signatures:  Ken العلي (DPM (Resident))  (Signed 13-Apr-2023 07:15)   Authored: History & Physical Reviewed, ERAS, Consent,  Note Completion  Pavan Powers)  (Signed 13-Apr-2023 07:25)   Authored: Note Completion   Co-Signer: History & Physical Reviewed, ERAS, Consent, Note Completion      Last Updated: 13-Apr-2023 07:25 by Pavan Powers)

## 2023-09-21 DIAGNOSIS — E11.69 TYPE 2 DIABETES MELLITUS WITH OTHER SPECIFIED COMPLICATION, WITHOUT LONG-TERM CURRENT USE OF INSULIN (MULTI): ICD-10-CM

## 2023-09-21 DIAGNOSIS — E78.5 HYPERLIPIDEMIA, UNSPECIFIED HYPERLIPIDEMIA TYPE: ICD-10-CM

## 2023-09-21 RX ORDER — ATORVASTATIN CALCIUM 80 MG/1
80 TABLET, FILM COATED ORAL DAILY
Qty: 90 TABLET | Refills: 0 | Status: SHIPPED | OUTPATIENT
Start: 2023-09-21 | End: 2023-12-22

## 2023-09-21 RX ORDER — ACARBOSE 25 MG/1
25 TABLET ORAL
Qty: 270 TABLET | Refills: 0 | Status: SHIPPED | OUTPATIENT
Start: 2023-09-21 | End: 2024-02-19 | Stop reason: SDUPTHER

## 2023-09-21 RX ORDER — METFORMIN HYDROCHLORIDE 1000 MG/1
1000 TABLET ORAL
Qty: 180 TABLET | Refills: 0 | Status: SHIPPED | OUTPATIENT
Start: 2023-09-21 | End: 2024-01-08 | Stop reason: SDUPTHER

## 2023-09-27 DIAGNOSIS — Z95.0 PRESENCE OF CARDIAC PACEMAKER: Primary | ICD-10-CM

## 2023-09-27 DIAGNOSIS — E11.69 TYPE 2 DIABETES MELLITUS WITH OTHER SPECIFIED COMPLICATION, WITHOUT LONG-TERM CURRENT USE OF INSULIN (MULTI): ICD-10-CM

## 2023-09-27 DIAGNOSIS — Z45.018 PACEMAKER REPROGRAMMING/CHECK: ICD-10-CM

## 2023-09-27 RX ORDER — METFORMIN HYDROCHLORIDE 1000 MG/1
1000 TABLET ORAL
Qty: 180 TABLET | Refills: 0 | OUTPATIENT
Start: 2023-09-27

## 2023-10-02 NOTE — OP NOTE
PROCEDURE DETAILS    Preoperative Diagnosis:  Lower extremity cellulitis, L03.119  Symptomatic internal fixation displaced/dislodged right foot     Postoperative Diagnosis:  Lower extremity cellulitis, L03.119  Symptomatic internal fixation displaced/dislodged right foot     Surgeon:  Pavan Hurd  Resident/Fellow/Other Assistant: Ken العلي    Procedure:  1. REMOVAL HARDWARE   (symptomatic internal fixation)   RIGHT FOOT  2. INCISION AND DRAINAGE AND DISSECTION BENEATH THE LEVEL OF FASCIA TO BONE WITH PULSE LAVAGE RIGHT FOOT     Anesthesia: Dr Martin  Estimated Blood Loss: <70ccs  Findings: Intraoperative findings consistent with clinical and radiographic findings.   Specimens(s) Collected: no,           Operative Report:   The patient was brought to the  operating room and placed on the operating table in the supine position.    Following IV sedation and intravenous prophylactic antibiotics of Vancomyocin,     Local Block: local anesthesia to the lateral aspect of the foot was obtained utilizing 10cc of 0.5% ropivicaine   plain in a Sanders block pattern and another 10cc of 0.5% ropivicaine  plain was used in a ankle block ring pattern.    Patient refused pre operative popliteal block.    The foot was then scrubbed, prepped, and draped in the usual aseptic manner.     Attention was directed to the right foot at the dorsal aspect of the Metatarsal phalangeal joint where a 4 cm longitudinal incision was made running parallel to the 1st  metatarsal shaft .  The incision was deepened and dissected in layers using both sharp and blunt dissection retracting all neurovascular and important structures.  Upon reaching the shaft of the 1 st metatarsal, a headless compression screw was visualized as expected in the distal shaft from fluoroscopy. The screw was then dissected out with a #15 blade and a dental pick and  retracted from the set with the appropriate . A hand screw  was used to unscrew that  headless screw and then a pair of needle drivers where us to extract the screw from the metatarsal head. All were past from the operative field. Cautery was  used to ligate all bleeders and hemostasis was achieved  .   The fascial layers were  and the incision opened medial and lateral and beneath the deep fascial layer and the wound was then flushed with copious amounts of sterile 3000ml's of saline mixed with 2g Vancomyocin.  The surgical site was then closed  in layers with 3-0 monocryl n a running subcuticular suture pattern, and the skin closed with 4-0 prolene in a simple suture pattern.     The incision was dressed with Betadine soaked adaptic and covered with sterile compressive dressing such as  four by fours and marj.  The .  The foot was then ace wrapped.    The patient tolerated the procedure well and was transferred to the recovery room with all vital signs stable and vascular status intact to the feet.  Following postoperative monitoring the patient will be discharged and given instructions and prescriptions  which were discussed prior to the surgery.    End of dictation.    Dr. Ken العلي - WILI dictated this operative report on account for Dr. Pavan Powers - WILI.                        Attestation:   Note Completion:  Attending Attestation I was present for the entire procedure    Attending Attestation I was present for the entire procedure          Electronic Signatures:  Ken العلي (WILI (Resident))  (Signed 13-Apr-2023 15:26)   Authored: Post-Operative Note, Chart Review, Note Completion  Pavan Powers)  (Signed 20-Apr-2023 15:13)   Authored: Post-Operative Note, Chart Review, Note Completion   Co-Signer: Post-Operative Note, Chart Review, Note Completion      Last Updated: 20-Apr-2023 15:13 by Pavan Powers)

## 2023-10-11 ENCOUNTER — APPOINTMENT (OUTPATIENT)
Dept: RADIOLOGY | Facility: HOSPITAL | Age: 58
End: 2023-10-11
Payer: COMMERCIAL

## 2023-10-30 DIAGNOSIS — F32.A DEPRESSION, UNSPECIFIED DEPRESSION TYPE: ICD-10-CM

## 2023-10-30 NOTE — TELEPHONE ENCOUNTER
Pt called rx line @ 2:51pm on 10/27/23 requesting RF on Citalopram. [facility]    Next OV 11/6/23  Pt compliant  Please advise. Thanks. JW

## 2023-10-31 PROBLEM — R53.83 TIREDNESS: Status: ACTIVE | Noted: 2023-10-31

## 2023-10-31 PROBLEM — I44.2 COMPLETE HEART BLOCK (MULTI): Status: ACTIVE | Noted: 2023-10-31

## 2023-10-31 RX ORDER — VARENICLINE TARTRATE 1 MG/1
1 TABLET, FILM COATED ORAL 2 TIMES DAILY
COMMUNITY
Start: 2023-09-30 | End: 2024-02-29

## 2023-10-31 RX ORDER — TORSEMIDE 20 MG/1
20 TABLET ORAL DAILY
COMMUNITY
Start: 2023-10-20 | End: 2023-11-02 | Stop reason: SDUPTHER

## 2023-10-31 RX ORDER — CITALOPRAM 40 MG/1
40 TABLET, FILM COATED ORAL DAILY
Qty: 30 TABLET | Refills: 0 | Status: SHIPPED | OUTPATIENT
Start: 2023-10-31 | End: 2023-11-06 | Stop reason: SDUPTHER

## 2023-10-31 RX ORDER — IPRATROPIUM BROMIDE AND ALBUTEROL SULFATE 2.5; .5 MG/3ML; MG/3ML
3 SOLUTION RESPIRATORY (INHALATION) EVERY 4 HOURS PRN
COMMUNITY
Start: 2023-08-07

## 2023-10-31 NOTE — ASSESSMENT & PLAN NOTE
with associated high red blood cell count, following up with hematologist. He tells me his sleep apnea is adequately controlled.

## 2023-10-31 NOTE — ASSESSMENT & PLAN NOTE
He has mild to moderate concentric LVH which is ongoing despite treatment of heart failure sleep apnea and blood pressure.  May have infiltrative cardiomyopathy although obesity could be contributing as well.  We will check ferritin levels.  Prior to visit next year we will obtain an echo with strain imaging.

## 2023-11-02 ENCOUNTER — OFFICE VISIT (OUTPATIENT)
Dept: CARDIOLOGY | Facility: CLINIC | Age: 58
End: 2023-11-02
Payer: COMMERCIAL

## 2023-11-02 VITALS
SYSTOLIC BLOOD PRESSURE: 130 MMHG | DIASTOLIC BLOOD PRESSURE: 70 MMHG | HEIGHT: 70 IN | WEIGHT: 315 LBS | HEART RATE: 70 BPM | BODY MASS INDEX: 45.1 KG/M2

## 2023-11-02 DIAGNOSIS — I50.32 CHRONIC DIASTOLIC (CONGESTIVE) HEART FAILURE (MULTI): ICD-10-CM

## 2023-11-02 DIAGNOSIS — I51.7 LEFT VENTRICULAR HYPERTROPHY: Primary | ICD-10-CM

## 2023-11-02 DIAGNOSIS — G47.33 OBSTRUCTIVE SLEEP APNEA: ICD-10-CM

## 2023-11-02 DIAGNOSIS — I10 ESSENTIAL HYPERTENSION: ICD-10-CM

## 2023-11-02 DIAGNOSIS — Z95.0 STATUS CARDIAC PACEMAKER: ICD-10-CM

## 2023-11-02 DIAGNOSIS — F17.200 MODERATE TOBACCO USE DISORDER: ICD-10-CM

## 2023-11-02 DIAGNOSIS — E78.5 HYPERLIPIDEMIA, UNSPECIFIED HYPERLIPIDEMIA TYPE: ICD-10-CM

## 2023-11-02 PROBLEM — I44.2 COMPLETE HEART BLOCK (MULTI): Status: RESOLVED | Noted: 2023-10-31 | Resolved: 2023-11-02

## 2023-11-02 PROCEDURE — 99214 OFFICE O/P EST MOD 30 MIN: CPT | Performed by: INTERNAL MEDICINE

## 2023-11-02 PROCEDURE — 93000 ELECTROCARDIOGRAM COMPLETE: CPT | Performed by: INTERNAL MEDICINE

## 2023-11-02 PROCEDURE — 3075F SYST BP GE 130 - 139MM HG: CPT | Performed by: INTERNAL MEDICINE

## 2023-11-02 PROCEDURE — 3078F DIAST BP <80 MM HG: CPT | Performed by: INTERNAL MEDICINE

## 2023-11-02 PROCEDURE — 3051F HG A1C>EQUAL 7.0%<8.0%: CPT | Performed by: INTERNAL MEDICINE

## 2023-11-02 PROCEDURE — 3008F BODY MASS INDEX DOCD: CPT | Performed by: INTERNAL MEDICINE

## 2023-11-02 RX ORDER — POTASSIUM CHLORIDE 750 MG/1
10 TABLET, FILM COATED, EXTENDED RELEASE ORAL DAILY
Qty: 90 TABLET | Refills: 1 | Status: SHIPPED | OUTPATIENT
Start: 2023-11-02 | End: 2024-06-03

## 2023-11-02 RX ORDER — TORSEMIDE 20 MG/1
20 TABLET ORAL DAILY
Qty: 90 TABLET | Refills: 1 | Status: SHIPPED | OUTPATIENT
Start: 2023-11-02 | End: 2023-11-06 | Stop reason: ALTCHOICE

## 2023-11-02 RX ORDER — ASPIRIN 325 MG
325 TABLET ORAL DAILY
COMMUNITY

## 2023-11-02 RX ORDER — SPIRONOLACTONE 25 MG/1
25 TABLET ORAL DAILY
Qty: 90 TABLET | Refills: 1 | Status: SHIPPED | OUTPATIENT
Start: 2023-11-02 | End: 2024-04-26 | Stop reason: SDUPTHER

## 2023-11-02 ASSESSMENT — ENCOUNTER SYMPTOMS
LOSS OF SENSATION IN FEET: 1
DEPRESSION: 1
OCCASIONAL FEELINGS OF UNSTEADINESS: 0

## 2023-11-02 NOTE — PROGRESS NOTES
"Chief Complaint:   Follow-up (6 month)     History Of Present Illness:    Morro Seo is a 58 y.o. male with history of type 2 diabetes mellitus, sleep apnea, smoking and obesity who presented to the ER with shortness of breath and dizziness for a month or 2, found to have second-degree AV block and complete heart block.  Lyme disease ruled out, Echo showed structurally normal heart, but Moderate concentric LVH. He underwent permanent pacemaker placement on January 20, 2020. Postoperatively had a infected toe and staph infection. He had a pharmacological stress MPI in 2020 showing normal perfusion and normal LV function.     Does not have chest pain, shortness of breath, palpitations, lightheadedness, ankle swelling. Felt 100% better after pacemaker implantation, but have been feeling more tired with lack of motivation recently. Diagnosed with polycythemia and now seeing a hematologist, periodic phlebotomy being done..     EKG showed atrial sensed with ventricular paced rhythm. Reviewed pacemaker interrogation report from September 2023 normally functioning pacer.     Unable to quit smoking with Chantix, nicotine patches and Wellbutrin-keeps trying however with intermittent relapse.. He states he has tried everything. He tells me his sleep apnea is well controlled on CPAP.     He was experiencing a lot of ankle swelling and we added some torsemide which helped.  He was having leg cramps that improved with potassium supplements.  He feels quitting smoking also helps his ankle swelling.    His echo from earlier in 2023 shows normal LV function but dilated RV and ongoing moderate concentric LVH.     Last Recorded Vitals:  Vitals:    11/02/23 1146   BP: 130/70   BP Location: Left arm   Pulse: 70   Weight: 145 kg (319 lb)   Height: 1.778 m (5' 10\")       Past Medical History:  He has a past medical history of Complete heart block (CMS/HCC) (10/31/2023), Diverticulosis of intestine, part unspecified, without " perforation or abscess without bleeding (01/30/2020), Personal history of other endocrine, nutritional and metabolic disease (01/30/2020), Personal history of other endocrine, nutritional and metabolic disease (01/30/2020), Personal history of other specified conditions (05/04/2018), Personal history of pneumonia (recurrent) (05/08/2018), Solitary pulmonary nodule (05/04/2018), and Unspecified open wound of unspecified toe(s) without damage to nail, sequela (08/11/2020).    Past Surgical History:  He has a past surgical history that includes Other surgical history (04/28/2020); Other surgical history (04/28/2020); Other surgical history (04/28/2020); Other surgical history (04/28/2020); and Other surgical history (07/23/2020).      Social History:  He reports that he has quit smoking. His smoking use included cigarettes. He smoked an average of 1 pack per day. He has never used smokeless tobacco. He reports that he does not currently use alcohol. He reports that he does not use drugs.    Family History:  Family History   Problem Relation Name Age of Onset    Heart attack Mother      Hypertension Mother      Coronary artery disease Father      Heart attack Father      Hypertension Father      Diabetes Sister      Diabetes Brother      Lung cancer Maternal Grandfather      Lung cancer Paternal Grandfather          Allergies:  Penicillins, Jardiance [empagliflozin], and Sulfamethoxazole-trimethoprim    Outpatient Medications:  Current Outpatient Medications   Medication Instructions    acarbose (PRECOSE) 25 mg, oral, 3 times daily with meals    ammonium lactate (Lac-Hydrin) 12 % lotion USE 1 APPLICATION TO THE AFFECTED AREA EVERY NIGHT    aspirin 325 mg, oral, Daily    atorvastatin (LIPITOR) 80 mg, oral, Daily    celecoxib (CELEBREX) 200 mg, oral, 2 times daily    citalopram (CELEXA) 40 mg, oral, Daily    glipiZIDE (Glucotrol) 5 mg tablet TAKE 1 TABLET BY MOUTH 2 TIMES A DAY BEFORE MEALS.    ipratropium-albuteroL  (Duo-Neb) 0.5-2.5 mg/3 mL nebulizer solution 3 mL, nebulization, Every 4 hours PRN    metFORMIN (GLUCOPHAGE) 1,000 mg, oral, 2 times daily with meals    oxyCODONE-acetaminophen (Percocet) 5-325 mg tablet     Ozempic 1 mg, subcutaneous, Weekly    potassium chloride CR 10 mEq ER tablet 10 mEq, oral, Daily    spironolactone (ALDACTONE) 25 mg, oral, Daily    Stiolto Respimat 2.5-2.5 mcg/actuation mist inhaler inhalation    torsemide (DEMADEX) 20 mg, oral, Daily    varenicline (CHANTIX) 1 mg, oral, 2 times daily       Physical Exam:  Physical Exam  Vitals reviewed.   Constitutional:       Appearance: Normal appearance.   Neck:      Vascular: No carotid bruit or JVD.   Cardiovascular:      Rate and Rhythm: Normal rate and regular rhythm.      Heart sounds: Normal heart sounds, S1 normal and S2 normal. No murmur heard.     Comments: Trace edema bilaterally.  Pulmonary:      Effort: Pulmonary effort is normal.      Breath sounds: Normal breath sounds.   Abdominal:      General: Abdomen is flat. Bowel sounds are normal.      Palpations: Abdomen is soft.   Musculoskeletal:      Right lower leg: No edema.      Left lower leg: No edema.   Skin:     General: Skin is warm.   Neurological:      Mental Status: He is alert. Mental status is at baseline.   Psychiatric:         Mood and Affect: Mood normal.         Behavior: Behavior normal.           Last Labs:  CBC -  Lab Results   Component Value Date    WBC 9.2 09/18/2023    HGB 16.8 09/18/2023    HCT 50.7 09/18/2023    MCV 86 09/18/2023     09/18/2023       CMP -  Lab Results   Component Value Date    CALCIUM 9.4 09/18/2023    PROT 7.0 09/18/2023    ALBUMIN 4.3 09/18/2023    AST 28 09/18/2023    ALT 40 09/18/2023    ALKPHOS 110 09/18/2023    BILITOT 1.4 (H) 09/18/2023       LIPID PANEL -   Lab Results   Component Value Date    CHOL 82 03/28/2022    TRIG 84 03/28/2022    HDL 35.3 (A) 03/28/2022    CHHDL 2.3 03/28/2022    LDLF 30 03/28/2022    VLDL 17 03/28/2022    NHDL 63  01/13/2020       RENAL FUNCTION PANEL -   Lab Results   Component Value Date    GLUCOSE 94 09/18/2023     09/18/2023    K 4.4 09/18/2023    CL 98 09/18/2023    CO2 33 (H) 09/18/2023    ANIONGAP 12 09/18/2023    BUN 22 09/18/2023    CREATININE 0.75 09/18/2023    GFRMALE >90 09/18/2023    CALCIUM 9.4 09/18/2023    ALBUMIN 4.3 09/18/2023        Lab Results   Component Value Date    BNP 11 08/04/2023    HGBA1C 7.4 (A) 05/09/2023         Assessment/Plan   Problem List Items Addressed This Visit             ICD-10-CM    Hyperlipidemia E78.5     He remains on a statin with history of diabetes for primary prevention.         Essential hypertension I10     Blood pressure is well controlled.  Continue current management.         Relevant Medications    potassium chloride CR 10 mEq ER tablet    torsemide (Demadex) 20 mg tablet    spironolactone (Aldactone) 25 mg tablet    Other Relevant Orders    ECG 12 lead (Clinic Performed)    Ferritin    Transthoracic echo (TTE) complete    Basic metabolic panel    Left ventricular hypertrophy - Primary I51.7     He has mild to moderate concentric LVH which is ongoing despite treatment of heart failure sleep apnea and blood pressure.  May have infiltrative cardiomyopathy although obesity could be contributing as well.  We will check ferritin levels.  Prior to visit next year we will obtain an echo with strain imaging.         Relevant Medications    potassium chloride CR 10 mEq ER tablet    torsemide (Demadex) 20 mg tablet    spironolactone (Aldactone) 25 mg tablet    Other Relevant Orders    ECG 12 lead (Clinic Performed)    Ferritin    Transthoracic echo (TTE) complete    Basic metabolic panel    Moderate tobacco use disorder F17.200     counselling this patient to quit smoking. wants to go cold turkey.          Relevant Orders    ECG 12 lead (Clinic Performed)    Ferritin    Transthoracic echo (TTE) complete    Basic metabolic panel    Obstructive sleep apnea G47.33    Relevant  Medications    potassium chloride CR 10 mEq ER tablet    torsemide (Demadex) 20 mg tablet    spironolactone (Aldactone) 25 mg tablet    Other Relevant Orders    ECG 12 lead (Clinic Performed)    Ferritin    Transthoracic echo (TTE) complete    Basic metabolic panel    Status cardiac pacemaker Z95.0     Functioning well followed through pacer clinic.         Chronic diastolic (congestive) heart failure (CMS/[facility]) I50.32     He has chronic diastolic heart failure he is well compensated on examination continue torsemide.  Add Aldactone, lower dose of potassium supplements and monitor potassium closely.  He states he gets blood work done every 3 months through PCP.    He could not tolerate SGLT2 inhibitors.    Hoping to be able to discontinue potassium in near future with addition of Aldactone.         Relevant Medications    potassium chloride CR 10 mEq ER tablet    torsemide (Demadex) 20 mg tablet    spironolactone (Aldactone) 25 mg tablet    Other Relevant Orders    ECG 12 lead (Clinic Performed)    Ferritin    Transthoracic echo (TTE) complete    Basic metabolic panel         Orquidea Núñez MD

## 2023-11-02 NOTE — ASSESSMENT & PLAN NOTE
He has chronic diastolic heart failure he is well compensated on examination continue torsemide.  Add Aldactone, lower dose of potassium supplements and monitor potassium closely.  He states he gets blood work done every 3 months through PCP.    He could not tolerate SGLT2 inhibitors.    Hoping to be able to discontinue potassium in near future with addition of Aldactone.

## 2023-11-06 ENCOUNTER — OFFICE VISIT (OUTPATIENT)
Dept: PRIMARY CARE | Facility: CLINIC | Age: 58
End: 2023-11-06
Payer: COMMERCIAL

## 2023-11-06 VITALS
WEIGHT: 315 LBS | TEMPERATURE: 97 F | OXYGEN SATURATION: 91 % | BODY MASS INDEX: 45.48 KG/M2 | SYSTOLIC BLOOD PRESSURE: 122 MMHG | HEART RATE: 75 BPM | DIASTOLIC BLOOD PRESSURE: 78 MMHG

## 2023-11-06 DIAGNOSIS — I50.32 CHRONIC DIASTOLIC (CONGESTIVE) HEART FAILURE (MULTI): Primary | ICD-10-CM

## 2023-11-06 DIAGNOSIS — E11.69 TYPE 2 DIABETES MELLITUS WITH OTHER SPECIFIED COMPLICATION, WITHOUT LONG-TERM CURRENT USE OF INSULIN (MULTI): ICD-10-CM

## 2023-11-06 DIAGNOSIS — I10 ESSENTIAL HYPERTENSION: ICD-10-CM

## 2023-11-06 DIAGNOSIS — E66.01 MORBID OBESITY (MULTI): ICD-10-CM

## 2023-11-06 DIAGNOSIS — F32.A DEPRESSION, UNSPECIFIED DEPRESSION TYPE: ICD-10-CM

## 2023-11-06 DIAGNOSIS — E78.5 HYPERLIPIDEMIA, UNSPECIFIED HYPERLIPIDEMIA TYPE: ICD-10-CM

## 2023-11-06 PROCEDURE — 3008F BODY MASS INDEX DOCD: CPT | Performed by: FAMILY MEDICINE

## 2023-11-06 PROCEDURE — 3074F SYST BP LT 130 MM HG: CPT | Performed by: FAMILY MEDICINE

## 2023-11-06 PROCEDURE — 99214 OFFICE O/P EST MOD 30 MIN: CPT | Performed by: FAMILY MEDICINE

## 2023-11-06 PROCEDURE — 3078F DIAST BP <80 MM HG: CPT | Performed by: FAMILY MEDICINE

## 2023-11-06 PROCEDURE — 3051F HG A1C>EQUAL 7.0%<8.0%: CPT | Performed by: FAMILY MEDICINE

## 2023-11-06 RX ORDER — CITALOPRAM 40 MG/1
40 TABLET, FILM COATED ORAL DAILY
Qty: 90 TABLET | Refills: 0 | Status: SHIPPED | OUTPATIENT
Start: 2023-11-06 | End: 2024-02-19 | Stop reason: SDUPTHER

## 2023-11-06 RX ORDER — SEMAGLUTIDE 1.34 MG/ML
1 INJECTION, SOLUTION SUBCUTANEOUS
Qty: 3 ML | Refills: 2 | Status: SHIPPED | OUTPATIENT
Start: 2023-11-06 | End: 2024-02-19 | Stop reason: SDUPTHER

## 2023-11-06 ASSESSMENT — ENCOUNTER SYMPTOMS
HEADACHES: 0
CONSTIPATION: 0
DYSURIA: 0
ARTHRALGIAS: 0
DYSPHORIC MOOD: 0
PALPITATIONS: 0
DIZZINESS: 0
BLOOD IN STOOL: 0
ABDOMINAL DISTENTION: 0
HYPERTENSION: 1
MYALGIAS: 0
FATIGUE: 0
DIARRHEA: 0
SLEEP DISTURBANCE: 0
SHORTNESS OF BREATH: 0
POLYPHAGIA: 0
POLYDIPSIA: 0
VOMITING: 0
DIFFICULTY URINATING: 0
NAUSEA: 0
ABDOMINAL PAIN: 0

## 2023-11-06 NOTE — PATIENT INSTRUCTIONS
Recommend a predominant whole foods plant based diet.  Cut back on meat, dairy, salt and oils. Increase fiber in your diet.  Decrease alcohol as much as possible if you drink. Recommend regular exercise most days of the week.    Obtain your blood work from June. Coordinate with cardiology blood work    Follow up with your specialists as scheduled    Continue your current meds    Return in 3 months, sooner if needed

## 2023-11-06 NOTE — PROGRESS NOTES
Subjective   Patient ID: Morro Seo is a 58 y.o. male who presents for Anxiety, Congestive Heart Failure, COPD, Diabetes, Sleep Apnea, Hypertension, and Hyperlipidemia. Due for bw    Anxiety  Patient reports no chest pain, dizziness, nausea, palpitations or shortness of breath.       Congestive Heart Failure  Pertinent negatives include no abdominal pain, chest pain, fatigue, palpitations or shortness of breath.   Diabetes  Pertinent negatives for hypoglycemia include no dizziness or headaches. Associated symptoms include polyuria. Pertinent negatives for diabetes include no chest pain, no fatigue, no polydipsia and no polyphagia.   Hypertension  Associated symptoms include anxiety. Pertinent negatives include no chest pain, headaches, palpitations or shortness of breath.   Hyperlipidemia  Pertinent negatives include no chest pain, myalgias or shortness of breath.      DM: due for recheck,. Has been off ozempic x 1 mo  NAVYA: compliant at bedtime. Sleeping better w/ new machine  HTN: controlled  HF: stable. Seeing cardiology. Taking meds as directed  Mood: remains controlled.   BMI: high. Trying to work on diet.     Review of Systems   Constitutional:  Negative for fatigue.   Eyes:  Negative for visual disturbance.   Respiratory:  Negative for shortness of breath.    Cardiovascular:  Negative for chest pain and palpitations.   Gastrointestinal:  Negative for abdominal distention, abdominal pain, blood in stool, constipation, diarrhea, nausea and vomiting.   Endocrine: Positive for polyuria. Negative for cold intolerance, heat intolerance, polydipsia and polyphagia.   Genitourinary:  Negative for difficulty urinating and dysuria.   Musculoskeletal:  Negative for arthralgias and myalgias.   Skin:  Negative for rash.   Neurological:  Negative for dizziness and headaches.   Psychiatric/Behavioral:  Negative for dysphoric mood and sleep disturbance.        Objective   /78   Pulse 75   Temp 36.1 °C (97 °F)   Wt  144 kg (317 lb)   SpO2 91%   BMI 45.48 kg/m²     Physical Exam  Vitals and nursing note reviewed.   Constitutional:       General: He is not in acute distress.     Appearance: Normal appearance. He is not toxic-appearing or diaphoretic.   HENT:      Head: Normocephalic.      Right Ear: Tympanic membrane normal.      Left Ear: Tympanic membrane normal.      Nose: Nose normal.      Mouth/Throat:      Pharynx: Oropharynx is clear.   Eyes:      General: No scleral icterus.     Pupils: Pupils are equal, round, and reactive to light.   Neck:      Vascular: No carotid bruit.   Cardiovascular:      Rate and Rhythm: Normal rate and regular rhythm.      Heart sounds: No murmur heard.  Pulmonary:      Effort: Pulmonary effort is normal. No respiratory distress.      Breath sounds: Normal breath sounds.   Abdominal:      General: Bowel sounds are normal.      Palpations: Abdomen is soft.      Tenderness: There is no abdominal tenderness. There is no guarding.   Musculoskeletal:      Cervical back: Neck supple.      Right lower leg: Edema (trace) present.      Left lower leg: Edema (trace) present.   Lymphadenopathy:      Cervical: No cervical adenopathy.   Skin:     General: Skin is warm.   Neurological:      General: No focal deficit present.      Mental Status: He is alert.      Cranial Nerves: No cranial nerve deficit.   Psychiatric:         Mood and Affect: Mood normal.         Assessment/Plan   Problem List Items Addressed This Visit             ICD-10-CM    Hyperlipidemia E78.5    Type 2 diabetes mellitus, without long-term current use of insulin (CMS/HCC) E11.9    Relevant Medications    semaglutide (Ozempic) 1 mg/dose (4 mg/3 mL) pen injector    Depression F32.A    Relevant Medications    citalopram (CeleXA) 40 mg tablet    Essential hypertension I10    Morbid obesity (CMS/HCC) E66.01    Chronic diastolic (congestive) heart failure (CMS/HCC) - Primary I50.32     Discussed prior bw.

## 2023-11-07 ENCOUNTER — APPOINTMENT (OUTPATIENT)
Dept: SLEEP MEDICINE | Facility: HOSPITAL | Age: 58
End: 2023-11-07
Payer: COMMERCIAL

## 2023-11-07 ENCOUNTER — TELEPHONE (OUTPATIENT)
Dept: PRIMARY CARE | Facility: CLINIC | Age: 58
End: 2023-11-07
Payer: COMMERCIAL

## 2023-11-07 NOTE — TELEPHONE ENCOUNTER
Received fax from insurance DENYING PA on Ozempic stating pt MUST try cheaper alternatives first.... No alternatives listed on denial.    Please advise Thx

## 2023-11-15 ENCOUNTER — LAB (OUTPATIENT)
Dept: LAB | Facility: LAB | Age: 58
End: 2023-11-15
Payer: COMMERCIAL

## 2023-11-15 DIAGNOSIS — F17.200 MODERATE TOBACCO USE DISORDER: ICD-10-CM

## 2023-11-15 DIAGNOSIS — D75.1 ERYTHROCYTOSIS: ICD-10-CM

## 2023-11-15 DIAGNOSIS — E78.5 HYPERLIPIDEMIA, UNSPECIFIED HYPERLIPIDEMIA TYPE: ICD-10-CM

## 2023-11-15 DIAGNOSIS — E11.69 TYPE 2 DIABETES MELLITUS WITH OTHER SPECIFIED COMPLICATION, WITHOUT LONG-TERM CURRENT USE OF INSULIN (MULTI): ICD-10-CM

## 2023-11-15 DIAGNOSIS — I10 ESSENTIAL HYPERTENSION: ICD-10-CM

## 2023-11-15 DIAGNOSIS — I51.7 LEFT VENTRICULAR HYPERTROPHY: ICD-10-CM

## 2023-11-15 DIAGNOSIS — I50.32 CHRONIC DIASTOLIC (CONGESTIVE) HEART FAILURE (MULTI): ICD-10-CM

## 2023-11-15 DIAGNOSIS — G47.33 OBSTRUCTIVE SLEEP APNEA: ICD-10-CM

## 2023-11-15 LAB
ALBUMIN SERPL BCP-MCNC: 4.3 G/DL (ref 3.4–5)
ALP SERPL-CCNC: 103 U/L (ref 33–120)
ALT SERPL W P-5'-P-CCNC: 30 U/L (ref 10–52)
ANION GAP SERPL CALC-SCNC: 13 MMOL/L (ref 10–20)
AST SERPL W P-5'-P-CCNC: 20 U/L (ref 9–39)
BASOPHILS # BLD AUTO: 0.03 X10*3/UL (ref 0–0.1)
BASOPHILS NFR BLD AUTO: 0.4 %
BILIRUB SERPL-MCNC: 1.3 MG/DL (ref 0–1.2)
BUN SERPL-MCNC: 16 MG/DL (ref 6–23)
CALCIUM SERPL-MCNC: 9.5 MG/DL (ref 8.6–10.3)
CHLORIDE SERPL-SCNC: 99 MMOL/L (ref 98–107)
CHOLEST SERPL-MCNC: 76 MG/DL (ref 0–199)
CHOLESTEROL/HDL RATIO: 2.5
CO2 SERPL-SCNC: 31 MMOL/L (ref 21–32)
CREAT SERPL-MCNC: 0.8 MG/DL (ref 0.5–1.3)
EOSINOPHIL # BLD AUTO: 0.17 X10*3/UL (ref 0–0.7)
EOSINOPHIL NFR BLD AUTO: 2.1 %
ERYTHROCYTE [DISTWIDTH] IN BLOOD BY AUTOMATED COUNT: 16.7 % (ref 11.5–14.5)
FERRITIN SERPL-MCNC: 36 NG/ML (ref 20–300)
GFR SERPL CREATININE-BSD FRML MDRD: >90 ML/MIN/1.73M*2
GLUCOSE SERPL-MCNC: 245 MG/DL (ref 74–99)
HCT VFR BLD AUTO: 52.3 % (ref 41–52)
HDLC SERPL-MCNC: 30.5 MG/DL
HGB BLD-MCNC: 17.2 G/DL (ref 13.5–17.5)
IMM GRANULOCYTES # BLD AUTO: 0.03 X10*3/UL (ref 0–0.7)
IMM GRANULOCYTES NFR BLD AUTO: 0.4 % (ref 0–0.9)
LDLC SERPL CALC-MCNC: 16 MG/DL
LYMPHOCYTES # BLD AUTO: 1.34 X10*3/UL (ref 1.2–4.8)
LYMPHOCYTES NFR BLD AUTO: 16.2 %
MCH RBC QN AUTO: 29.9 PG (ref 26–34)
MCHC RBC AUTO-ENTMCNC: 32.9 G/DL (ref 32–36)
MCV RBC AUTO: 91 FL (ref 80–100)
MONOCYTES # BLD AUTO: 0.59 X10*3/UL (ref 0.1–1)
MONOCYTES NFR BLD AUTO: 7.1 %
NEUTROPHILS # BLD AUTO: 6.13 X10*3/UL (ref 1.2–7.7)
NEUTROPHILS NFR BLD AUTO: 73.8 %
NON HDL CHOLESTEROL: 46 MG/DL (ref 0–149)
NRBC BLD-RTO: 0 /100 WBCS (ref 0–0)
PLATELET # BLD AUTO: 163 X10*3/UL (ref 150–450)
POTASSIUM SERPL-SCNC: 4.6 MMOL/L (ref 3.5–5.3)
PROT SERPL-MCNC: 6.3 G/DL (ref 6.4–8.2)
RBC # BLD AUTO: 5.76 X10*6/UL (ref 4.5–5.9)
SODIUM SERPL-SCNC: 138 MMOL/L (ref 136–145)
TRIGL SERPL-MCNC: 148 MG/DL (ref 0–149)
VLDL: 30 MG/DL (ref 0–40)
WBC # BLD AUTO: 8.3 X10*3/UL (ref 4.4–11.3)

## 2023-11-15 PROCEDURE — 80053 COMPREHEN METABOLIC PANEL: CPT

## 2023-11-15 PROCEDURE — 36415 COLL VENOUS BLD VENIPUNCTURE: CPT

## 2023-11-15 PROCEDURE — 82728 ASSAY OF FERRITIN: CPT

## 2023-11-15 PROCEDURE — 80061 LIPID PANEL: CPT

## 2023-11-15 PROCEDURE — 83036 HEMOGLOBIN GLYCOSYLATED A1C: CPT

## 2023-11-15 PROCEDURE — 85025 COMPLETE CBC W/AUTO DIFF WBC: CPT

## 2023-11-16 LAB
EST. AVERAGE GLUCOSE BLD GHB EST-MCNC: 183 MG/DL
HBA1C MFR BLD: 8 %

## 2023-11-17 ENCOUNTER — TELEPHONE (OUTPATIENT)
Dept: PRIMARY CARE | Facility: CLINIC | Age: 58
End: 2023-11-17
Payer: COMMERCIAL

## 2023-11-17 NOTE — TELEPHONE ENCOUNTER
----- Message from Lisseth Thomas DO sent at 11/17/2023  6:23 AM EST -----  Pls tell pt that his BS is still high. A1c 8% pt was off ozempic before bw so recommend restarting and working on diet as discussed at ov. Chol is controlled

## 2023-12-18 DIAGNOSIS — E11.69 TYPE 2 DIABETES MELLITUS WITH OTHER SPECIFIED COMPLICATION, WITHOUT LONG-TERM CURRENT USE OF INSULIN (MULTI): ICD-10-CM

## 2023-12-18 DIAGNOSIS — E78.5 HYPERLIPIDEMIA, UNSPECIFIED HYPERLIPIDEMIA TYPE: ICD-10-CM

## 2023-12-22 RX ORDER — ATORVASTATIN CALCIUM 80 MG/1
80 TABLET, FILM COATED ORAL DAILY
Qty: 90 TABLET | Refills: 0 | Status: SHIPPED | OUTPATIENT
Start: 2023-12-22 | End: 2024-02-19 | Stop reason: SDUPTHER

## 2024-01-08 DIAGNOSIS — E11.69 TYPE 2 DIABETES MELLITUS WITH OTHER SPECIFIED COMPLICATION, WITHOUT LONG-TERM CURRENT USE OF INSULIN (MULTI): ICD-10-CM

## 2024-01-09 ENCOUNTER — HOSPITAL ENCOUNTER (OUTPATIENT)
Dept: CARDIOLOGY | Facility: HOSPITAL | Age: 59
Discharge: HOME | End: 2024-01-09
Payer: COMMERCIAL

## 2024-01-09 DIAGNOSIS — I44.2 ATRIOVENTRICULAR BLOCK, COMPLETE (MULTI): Primary | ICD-10-CM

## 2024-01-09 DIAGNOSIS — Z95.0 PRESENCE OF CARDIAC PACEMAKER: ICD-10-CM

## 2024-01-09 PROCEDURE — 93294 REM INTERROG EVL PM/LDLS PM: CPT | Performed by: INTERNAL MEDICINE

## 2024-01-09 PROCEDURE — 93296 REM INTERROG EVL PM/IDS: CPT

## 2024-01-09 RX ORDER — METFORMIN HYDROCHLORIDE 1000 MG/1
1000 TABLET ORAL
Qty: 180 TABLET | Refills: 0 | Status: SHIPPED | OUTPATIENT
Start: 2024-01-09 | End: 2024-02-19 | Stop reason: SDUPTHER

## 2024-01-17 DIAGNOSIS — R60.0 LOCALIZED EDEMA: ICD-10-CM

## 2024-01-22 RX ORDER — TORSEMIDE 20 MG/1
20 TABLET ORAL DAILY
Qty: 30 TABLET | Refills: 1 | Status: SHIPPED | OUTPATIENT
Start: 2024-01-22 | End: 2024-03-26

## 2024-02-19 ENCOUNTER — HOSPITAL ENCOUNTER (EMERGENCY)
Facility: HOSPITAL | Age: 59
Discharge: HOME | End: 2024-02-19
Attending: EMERGENCY MEDICINE
Payer: COMMERCIAL

## 2024-02-19 ENCOUNTER — OFFICE VISIT (OUTPATIENT)
Dept: PRIMARY CARE | Facility: CLINIC | Age: 59
End: 2024-02-19
Payer: COMMERCIAL

## 2024-02-19 VITALS
BODY MASS INDEX: 45.2 KG/M2 | OXYGEN SATURATION: 98 % | DIASTOLIC BLOOD PRESSURE: 58 MMHG | WEIGHT: 315 LBS | TEMPERATURE: 97.6 F | HEART RATE: 55 BPM | SYSTOLIC BLOOD PRESSURE: 104 MMHG

## 2024-02-19 VITALS
TEMPERATURE: 97.8 F | HEART RATE: 76 BPM | OXYGEN SATURATION: 95 % | RESPIRATION RATE: 18 BRPM | HEIGHT: 70 IN | SYSTOLIC BLOOD PRESSURE: 131 MMHG | WEIGHT: 315 LBS | DIASTOLIC BLOOD PRESSURE: 71 MMHG | BODY MASS INDEX: 45.1 KG/M2

## 2024-02-19 DIAGNOSIS — F32.A DEPRESSION, UNSPECIFIED DEPRESSION TYPE: ICD-10-CM

## 2024-02-19 DIAGNOSIS — R52 BODY ACHES: ICD-10-CM

## 2024-02-19 DIAGNOSIS — K92.0 HEMATEMESIS, UNSPECIFIED WHETHER NAUSEA PRESENT: Primary | ICD-10-CM

## 2024-02-19 DIAGNOSIS — E11.69 TYPE 2 DIABETES MELLITUS WITH OTHER SPECIFIED COMPLICATION, WITHOUT LONG-TERM CURRENT USE OF INSULIN (MULTI): ICD-10-CM

## 2024-02-19 DIAGNOSIS — E78.5 HYPERLIPIDEMIA, UNSPECIFIED HYPERLIPIDEMIA TYPE: ICD-10-CM

## 2024-02-19 DIAGNOSIS — K92.0 HEMATEMESIS WITH NAUSEA: Primary | ICD-10-CM

## 2024-02-19 LAB
ABO GROUP (TYPE) IN BLOOD: NORMAL
ALBUMIN SERPL BCP-MCNC: 4 G/DL (ref 3.4–5)
ALP SERPL-CCNC: 92 U/L (ref 33–120)
ALT SERPL W P-5'-P-CCNC: 23 U/L (ref 10–52)
ANION GAP SERPL CALC-SCNC: 9 MMOL/L (ref 10–20)
ANTIBODY SCREEN: NORMAL
APPEARANCE UR: ABNORMAL
AST SERPL W P-5'-P-CCNC: 18 U/L (ref 9–39)
BASOPHILS # BLD AUTO: 0.03 X10*3/UL (ref 0–0.1)
BASOPHILS NFR BLD AUTO: 0.3 %
BILIRUB SERPL-MCNC: 1.1 MG/DL (ref 0–1.2)
BILIRUB UR STRIP.AUTO-MCNC: NEGATIVE MG/DL
BUN SERPL-MCNC: 13 MG/DL (ref 6–23)
CALCIUM SERPL-MCNC: 9.4 MG/DL (ref 8.6–10.3)
CHLORIDE SERPL-SCNC: 102 MMOL/L (ref 98–107)
CO2 SERPL-SCNC: 30 MMOL/L (ref 21–32)
COLOR UR: YELLOW
CREAT SERPL-MCNC: 0.84 MG/DL (ref 0.5–1.3)
EGFRCR SERPLBLD CKD-EPI 2021: >90 ML/MIN/1.73M*2
EOSINOPHIL # BLD AUTO: 0.27 X10*3/UL (ref 0–0.7)
EOSINOPHIL NFR BLD AUTO: 2.8 %
ERYTHROCYTE [DISTWIDTH] IN BLOOD BY AUTOMATED COUNT: 14.5 % (ref 11.5–14.5)
GLUCOSE SERPL-MCNC: 205 MG/DL (ref 74–99)
GLUCOSE UR STRIP.AUTO-MCNC: NEGATIVE MG/DL
HCT VFR BLD AUTO: 51.2 % (ref 41–52)
HGB BLD-MCNC: 17.2 G/DL (ref 13.5–17.5)
IMM GRANULOCYTES # BLD AUTO: 0.05 X10*3/UL (ref 0–0.7)
IMM GRANULOCYTES NFR BLD AUTO: 0.5 % (ref 0–0.9)
INR PPP: 1.1 (ref 0.9–1.1)
KETONES UR STRIP.AUTO-MCNC: ABNORMAL MG/DL
LEUKOCYTE ESTERASE UR QL STRIP.AUTO: NEGATIVE
LYMPHOCYTES # BLD AUTO: 1.18 X10*3/UL (ref 1.2–4.8)
LYMPHOCYTES NFR BLD AUTO: 12.3 %
MCH RBC QN AUTO: 29.9 PG (ref 26–34)
MCHC RBC AUTO-ENTMCNC: 33.6 G/DL (ref 32–36)
MCV RBC AUTO: 89 FL (ref 80–100)
MONOCYTES # BLD AUTO: 0.55 X10*3/UL (ref 0.1–1)
MONOCYTES NFR BLD AUTO: 5.7 %
NEUTROPHILS # BLD AUTO: 7.54 X10*3/UL (ref 1.2–7.7)
NEUTROPHILS NFR BLD AUTO: 78.4 %
NITRITE UR QL STRIP.AUTO: NEGATIVE
NRBC BLD-RTO: 0 /100 WBCS (ref 0–0)
PH UR STRIP.AUTO: 5 [PH]
PLATELET # BLD AUTO: 167 X10*3/UL (ref 150–450)
POTASSIUM SERPL-SCNC: 4.4 MMOL/L (ref 3.5–5.3)
PROT SERPL-MCNC: 6.4 G/DL (ref 6.4–8.2)
PROT UR STRIP.AUTO-MCNC: NEGATIVE MG/DL
PROTHROMBIN TIME: 12.5 SECONDS (ref 9.8–12.8)
RBC # BLD AUTO: 5.76 X10*6/UL (ref 4.5–5.9)
RBC # UR STRIP.AUTO: NEGATIVE /UL
RH FACTOR (ANTIGEN D): NORMAL
SODIUM SERPL-SCNC: 137 MMOL/L (ref 136–145)
SP GR UR STRIP.AUTO: 1.02
UROBILINOGEN UR STRIP.AUTO-MCNC: 4 MG/DL
WBC # BLD AUTO: 9.6 X10*3/UL (ref 4.4–11.3)

## 2024-02-19 PROCEDURE — C9113 INJ PANTOPRAZOLE SODIUM, VIA: HCPCS | Performed by: EMERGENCY MEDICINE

## 2024-02-19 PROCEDURE — 85610 PROTHROMBIN TIME: CPT | Performed by: EMERGENCY MEDICINE

## 2024-02-19 PROCEDURE — 86901 BLOOD TYPING SEROLOGIC RH(D): CPT | Performed by: EMERGENCY MEDICINE

## 2024-02-19 PROCEDURE — 80053 COMPREHEN METABOLIC PANEL: CPT | Performed by: EMERGENCY MEDICINE

## 2024-02-19 PROCEDURE — 3078F DIAST BP <80 MM HG: CPT | Performed by: FAMILY MEDICINE

## 2024-02-19 PROCEDURE — 99215 OFFICE O/P EST HI 40 MIN: CPT | Performed by: FAMILY MEDICINE

## 2024-02-19 PROCEDURE — 96374 THER/PROPH/DIAG INJ IV PUSH: CPT

## 2024-02-19 PROCEDURE — 3008F BODY MASS INDEX DOCD: CPT | Performed by: FAMILY MEDICINE

## 2024-02-19 PROCEDURE — 99284 EMERGENCY DEPT VISIT MOD MDM: CPT | Mod: 25

## 2024-02-19 PROCEDURE — 81003 URINALYSIS AUTO W/O SCOPE: CPT | Performed by: EMERGENCY MEDICINE

## 2024-02-19 PROCEDURE — 83036 HEMOGLOBIN GLYCOSYLATED A1C: CPT | Performed by: EMERGENCY MEDICINE

## 2024-02-19 PROCEDURE — 85025 COMPLETE CBC W/AUTO DIFF WBC: CPT | Performed by: EMERGENCY MEDICINE

## 2024-02-19 PROCEDURE — 3074F SYST BP LT 130 MM HG: CPT | Performed by: FAMILY MEDICINE

## 2024-02-19 PROCEDURE — 36415 COLL VENOUS BLD VENIPUNCTURE: CPT | Performed by: EMERGENCY MEDICINE

## 2024-02-19 PROCEDURE — 2500000004 HC RX 250 GENERAL PHARMACY W/ HCPCS (ALT 636 FOR OP/ED): Performed by: EMERGENCY MEDICINE

## 2024-02-19 PROCEDURE — 1036F TOBACCO NON-USER: CPT | Performed by: FAMILY MEDICINE

## 2024-02-19 RX ORDER — ATORVASTATIN CALCIUM 80 MG/1
80 TABLET, FILM COATED ORAL DAILY
Qty: 90 TABLET | Refills: 0 | Status: SHIPPED | OUTPATIENT
Start: 2024-02-19

## 2024-02-19 RX ORDER — PANTOPRAZOLE SODIUM 40 MG/10ML
40 INJECTION, POWDER, LYOPHILIZED, FOR SOLUTION INTRAVENOUS ONCE
Status: COMPLETED | OUTPATIENT
Start: 2024-02-19 | End: 2024-02-19

## 2024-02-19 RX ORDER — CITALOPRAM 40 MG/1
40 TABLET, FILM COATED ORAL DAILY
Qty: 30 TABLET | Refills: 0 | Status: SHIPPED | OUTPATIENT
Start: 2024-02-19 | End: 2024-04-04

## 2024-02-19 RX ORDER — ACARBOSE 25 MG/1
25 TABLET ORAL
Qty: 270 TABLET | Refills: 0 | Status: SHIPPED | OUTPATIENT
Start: 2024-02-19 | End: 2024-05-19

## 2024-02-19 RX ORDER — CELECOXIB 200 MG/1
200 CAPSULE ORAL 2 TIMES DAILY
Qty: 180 CAPSULE | Refills: 1 | Status: CANCELLED | OUTPATIENT
Start: 2024-02-19

## 2024-02-19 RX ORDER — METFORMIN HYDROCHLORIDE 1000 MG/1
1000 TABLET ORAL
Qty: 180 TABLET | Refills: 0 | Status: SHIPPED | OUTPATIENT
Start: 2024-02-19

## 2024-02-19 RX ORDER — FAMOTIDINE 20 MG/1
20 TABLET, FILM COATED ORAL DAILY
Qty: 30 TABLET | Refills: 0 | Status: SHIPPED | OUTPATIENT
Start: 2024-02-19 | End: 2024-02-29

## 2024-02-19 RX ORDER — SEMAGLUTIDE 1.34 MG/ML
1 INJECTION, SOLUTION SUBCUTANEOUS
Qty: 3 ML | Refills: 2 | Status: SHIPPED | OUTPATIENT
Start: 2024-02-19 | End: 2024-02-29

## 2024-02-19 RX ADMIN — PANTOPRAZOLE SODIUM 40 MG: 40 INJECTION, POWDER, FOR SOLUTION INTRAVENOUS at 17:48

## 2024-02-19 ASSESSMENT — LIFESTYLE VARIABLES
HAVE YOU EVER FELT YOU SHOULD CUT DOWN ON YOUR DRINKING: NO
EVER HAD A DRINK FIRST THING IN THE MORNING TO STEADY YOUR NERVES TO GET RID OF A HANGOVER: NO
HAVE PEOPLE ANNOYED YOU BY CRITICIZING YOUR DRINKING: NO
EVER FELT BAD OR GUILTY ABOUT YOUR DRINKING: NO

## 2024-02-19 ASSESSMENT — ENCOUNTER SYMPTOMS
HEADACHES: 0
DYSPHORIC MOOD: 0
VOMITING: 0
NAUSEA: 0
DIZZINESS: 0
BLOOD IN STOOL: 0
FATIGUE: 1
DIARRHEA: 0
ARTHRALGIAS: 1
PALPITATIONS: 0
CONSTIPATION: 0
MYALGIAS: 1
SHORTNESS OF BREATH: 0
SLEEP DISTURBANCE: 0
ABDOMINAL PAIN: 0

## 2024-02-19 ASSESSMENT — COLUMBIA-SUICIDE SEVERITY RATING SCALE - C-SSRS
2. HAVE YOU ACTUALLY HAD ANY THOUGHTS OF KILLING YOURSELF?: NO
1. IN THE PAST MONTH, HAVE YOU WISHED YOU WERE DEAD OR WISHED YOU COULD GO TO SLEEP AND NOT WAKE UP?: NO
6. HAVE YOU EVER DONE ANYTHING, STARTED TO DO ANYTHING, OR PREPARED TO DO ANYTHING TO END YOUR LIFE?: NO

## 2024-02-19 ASSESSMENT — PAIN - FUNCTIONAL ASSESSMENT: PAIN_FUNCTIONAL_ASSESSMENT: 0-10

## 2024-02-19 ASSESSMENT — PAIN SCALES - GENERAL: PAINLEVEL_OUTOF10: 5 - MODERATE PAIN

## 2024-02-19 NOTE — PATIENT INSTRUCTIONS
You were referred to the ER    Stop all NSAIDs meds including aspirin and celebrex    Return after discharge

## 2024-02-19 NOTE — ED PROVIDER NOTES
HPI   Chief Complaint   Patient presents with    Vomiting Blood     1 episode of emesis with bright red blood this AM. Went to PCP who sent here as he takes baby aspirin and celebrex.        HPI     HISTORY OF PRESENT ILLNESS:  Patient is a 58-year-old male who presents emergency department for hematemesis.  Patient states that approximately 4 days ago, he had 1 episode of nausea and vomiting.  Had self resolved.  The patient this morning had 1 episode of nausea and vomiting.  No longer feels nauseated.  He had a routine follow-up with his primary care physician shortly afterward. At the patient's primary care physician note, his blood pressures were 104/58.  Due to the hematemesis and low vital signs, patient was referred to the emergency department.  Patient states that he is on Celebrex 200 mg twice daily in addition to a daily baby aspirin.  He has never had a GI bleed before.  Has not had EGD or colonoscopy recently.  Denies any bloody stool, melanotic stool.    Past Medical History: Congestive heart failure, sleep apnea, diabetes, hyperlipidemia, CRS  Family History: family history not pertinent to presenting problem or chief complaint  Social History: Current smoker, remote alcohol use approximately 30 years ago and describes as heavy use    __________________________________________________________  PHYSICAL EXAM:    Appearance: Alert, oriented , cooperative   Skin: Intact,  dry skin, no lesions, rash, petechiae or purpura.   Eyes: PERRLA, EOMs intact,  Conjunctiva pink with no redness or exudates.    HENT: Normocephalic, atraumatic. Nares patent   Neck: Supple. Trachea at midline.   Pulmonary: Lung sounds are clear bilaterally.  There is no rales, rhonchi, or wheezing.  Cardiac: Regular rate and rhythm, no rubs, murmurs, or gallops. No JVD,   Abdomen: Abdomen is soft, nontender, and nondistended.  No palpable organomegaly.  No rebound or guarding.  No CVA tenderness. Nonsurgical abdomen  Genitourinary: Exam  deferred.  Musculoskeletal: no edema, pain, cyanosis, or deformity in extremities. Pulses full and equal.   Neurological:  Cranial nerves are grossly intact, grossly normal sensation, no weakness, no focal findings identified.    __________________________________________________________  MEDICAL DECISION MAKING:    Patient was seen and examined. Differential diagnosis for hematemesis includes upper GI bleed, lower GI bleed.  Patient denies any bloody stools.  1 episode of hematemesis.  Described as small amount of bright red blood.  Patient currently not nauseous.  Patient hemodynamically stable at this time.  Patient will have labs obtained.  I did discuss with the patient about outpatient labs.  However, they were requesting a hemoglobin A1c.  After understanding possible cost incurred by this, agreed to obtain the lab.  Patient was given Protonix.  He states that he did have remote alcohol use but no known varices and has not used alcohol recently.  Currently has no nausea or vomiting.  Abdominal exam is benign.    Patient labs are overall unremarkable.  Patient hemoglobin was stable when compared to 3 months ago.  Patient did not have a BUN elevation concerning for upper GI bleed.  He had no further hematemesis here.  This point, I do believe that the patient is stable for outpatient follow-up.  Patient felt comfortable with strict return precautions.  He will be started on a antacid daily.  He will follow-up with GI.  Patient verbalized understanding, agreed to plan, patient was discharged home.    Chronic Medical Conditions Significantly Affecting Care: Type 2 diabetes, depression, hyperlipidemia    External Records Reviewed: I reviewed recent and relevant outside records including: Primary care physician note from today  Dex Suárez  Emergency Medicine               Garth Coma Scale Score: 15                     Patient History   Past Medical History:   Diagnosis Date    Complete heart block (CMS/HCC)  10/31/2023    Diverticulosis of intestine, part unspecified, without perforation or abscess without bleeding 01/30/2020    Diverticulosis    Personal history of other endocrine, nutritional and metabolic disease 01/30/2020    History of diabetic nephropathy    Personal history of other endocrine, nutritional and metabolic disease 01/30/2020    History of diabetes mellitus    Personal history of other specified conditions 05/04/2018    History of dyspnea    Personal history of pneumonia (recurrent) 05/08/2018    History of pneumonia    Solitary pulmonary nodule 05/04/2018    Lung nodule    Unspecified open wound of unspecified toe(s) without damage to nail, sequela 08/11/2020    Non-healing open wound of toe, sequela     Past Surgical History:   Procedure Laterality Date    OTHER SURGICAL HISTORY  04/28/2020    Permanent pacemaker insertion    OTHER SURGICAL HISTORY  04/28/2020    Leg surgery    OTHER SURGICAL HISTORY  04/28/2020    Knee surgery    OTHER SURGICAL HISTORY  04/28/2020    Tonsillectomy with adenoidectomy    OTHER SURGICAL HISTORY  07/23/2020    Foot surgery     Family History   Problem Relation Name Age of Onset    Heart attack Mother      Hypertension Mother      Coronary artery disease Father      Heart attack Father      Hypertension Father      Diabetes Sister      Diabetes Brother      Lung cancer Maternal Grandfather      Lung cancer Paternal Grandfather       Social History     Tobacco Use    Smoking status: Former     Packs/day: 1     Types: Cigarettes    Smokeless tobacco: Never   Substance Use Topics    Alcohol use: Not Currently     Comment: rarely    Drug use: Never       Physical Exam   ED Triage Vitals [02/19/24 1624]   Temperature Heart Rate Respirations BP   36.6 °C (97.8 °F) 76 18 131/71      Pulse Ox Temp Source Heart Rate Source Patient Position   95 % Temporal Monitor Sitting      BP Location FiO2 (%)     Left arm --       Physical Exam    ED Course & MDM   ED Course as of 02/19/24  1927 Mon Feb 19, 2024   1801 HEMOGLOBIN: 17.2 [WJ]   1832 HEMOGLOBIN: 17.2  Patient hemoglobin is stable at 17.2 compared to 3 months ago at 17.2.  Given bleeding episode was this morning, did not appear to be a significant amount of bleeding. [WJ]      ED Course User Index  [WJ] Dex Suárez DO         Diagnoses as of 02/19/24 1927   Hematemesis, unspecified whether nausea present       Medical Decision Making      Procedure  Procedures     Dex Suárez DO  02/19/24 1929

## 2024-02-19 NOTE — PROGRESS NOTES
"Subjective   Patient ID: Morro Seo is a 58 y.o. male who presents for Congestive Heart Failure (Recheck ), Sleep Apnea, and Weight Gain (Discuss weight gain, all over joint pain x\"years\"- getting worse and vomitting due to pain). Did not have bw    HPI   Hematemesis: vomited bright red blood after feeling very N and had pain all over. Wife and pt states \"there as a lot of bld and mostly bld.\"  [place] better after vomiting. Chronic smoker. No black stool. No recurrence but wife states pt has chronic cough. Has not had CT chest ordered from aug and recently from pul    DM/: due for recheck    Lipids: have been stable.     Mood: more irritable. Taking SSRI but wife feels he needs med change.     Review of Systems   Constitutional:  Positive for fatigue.   HENT: Negative.     Eyes:  Negative for visual disturbance.   Respiratory:  Negative for shortness of breath.    Cardiovascular:  Negative for chest pain and palpitations.   Gastrointestinal:  Negative for abdominal pain, blood in stool, constipation, diarrhea, nausea and vomiting.   Musculoskeletal:  Positive for arthralgias and myalgias.   Neurological:  Negative for dizziness and headaches.   Psychiatric/Behavioral:  Negative for dysphoric mood and sleep disturbance.        Objective   /58   Pulse 55   Temp 36.4 °C (97.6 °F)   Wt 143 kg (315 lb)   SpO2 98%   BMI 45.20 kg/m²     Physical Exam  Vitals and nursing note reviewed.   Constitutional:       General: He is not in acute distress.     Appearance: Normal appearance. He is not toxic-appearing.   HENT:      Head: Normocephalic.   Eyes:      Pupils: Pupils are equal, round, and reactive to light.   Cardiovascular:      Rate and Rhythm: Normal rate and regular rhythm.      Heart sounds: No murmur heard.  Pulmonary:      Effort: Pulmonary effort is normal. No respiratory distress.      Breath sounds: Normal breath sounds.   Abdominal:      General: Bowel sounds are normal.      Palpations: Abdomen is " soft.      Tenderness: There is no abdominal tenderness. There is no guarding.   Musculoskeletal:      Right lower leg: No edema.      Left lower leg: No edema.   Skin:     General: Skin is warm.   Neurological:      General: No focal deficit present.      Mental Status: He is alert.      Cranial Nerves: No cranial nerve deficit.   Psychiatric:         Mood and Affect: Mood normal.         Assessment/Plan   Problem List Items Addressed This Visit             ICD-10-CM    Hyperlipidemia E78.5    Relevant Medications    atorvastatin (Lipitor) 80 mg tablet    Type 2 diabetes mellitus, without long-term current use of insulin (CMS/formerly [facility]) E11.9    Relevant Medications    atorvastatin (Lipitor) 80 mg tablet    acarbose (Precose) 25 mg tablet    semaglutide (Ozempic) 1 mg/dose (4 mg/3 mL) pen injector    metFORMIN (Glucophage) 1,000 mg tablet    Other Relevant Orders    Hemoglobin A1C    Depression F32.A    Relevant Medications    citalopram (CeleXA) 40 mg tablet     Other Visit Diagnoses         Codes    Hematemesis with nausea    -  Primary K92.0    Body aches     R52             Recommend pt go to ER due to low BP, hematemesis. Pt has not been feeling well. Pt agrees to take him now by car

## 2024-02-20 LAB
EST. AVERAGE GLUCOSE BLD GHB EST-MCNC: 200 MG/DL
HBA1C MFR BLD: 8.6 %

## 2024-02-21 ENCOUNTER — TELEPHONE (OUTPATIENT)
Dept: PRIMARY CARE | Facility: CLINIC | Age: 59
End: 2024-02-21

## 2024-02-21 DIAGNOSIS — E11.69 TYPE 2 DIABETES MELLITUS WITH OTHER SPECIFIED COMPLICATION, WITHOUT LONG-TERM CURRENT USE OF INSULIN (MULTI): ICD-10-CM

## 2024-02-21 RX ORDER — SEMAGLUTIDE 1.34 MG/ML
1 INJECTION, SOLUTION SUBCUTANEOUS
Qty: 3 ML | Refills: 2 | Status: CANCELLED | OUTPATIENT
Start: 2024-02-21

## 2024-02-21 NOTE — TELEPHONE ENCOUNTER
PA on pended med - DENIED    Pt MUST have tried and failed for >120 days - 3 preferred meds:  -byetta  -victoza  -trulicity  -farxiga  -invokana  -jardiance    Please advise Thx

## 2024-02-29 ENCOUNTER — OFFICE VISIT (OUTPATIENT)
Dept: PRIMARY CARE | Facility: CLINIC | Age: 59
End: 2024-02-29
Payer: COMMERCIAL

## 2024-02-29 VITALS
TEMPERATURE: 96.7 F | BODY MASS INDEX: 44.77 KG/M2 | DIASTOLIC BLOOD PRESSURE: 70 MMHG | HEART RATE: 68 BPM | WEIGHT: 312 LBS | SYSTOLIC BLOOD PRESSURE: 124 MMHG | OXYGEN SATURATION: 96 %

## 2024-02-29 DIAGNOSIS — E11.69 TYPE 2 DIABETES MELLITUS WITH OTHER SPECIFIED COMPLICATION, WITHOUT LONG-TERM CURRENT USE OF INSULIN (MULTI): Primary | ICD-10-CM

## 2024-02-29 DIAGNOSIS — F33.0 MILD EPISODE OF RECURRENT MAJOR DEPRESSIVE DISORDER (CMS-HCC): ICD-10-CM

## 2024-02-29 DIAGNOSIS — K92.0 HEMATEMESIS WITH NAUSEA: ICD-10-CM

## 2024-02-29 PROCEDURE — 3052F HG A1C>EQUAL 8.0%<EQUAL 9.0%: CPT | Performed by: FAMILY MEDICINE

## 2024-02-29 PROCEDURE — 3078F DIAST BP <80 MM HG: CPT | Performed by: FAMILY MEDICINE

## 2024-02-29 PROCEDURE — 99214 OFFICE O/P EST MOD 30 MIN: CPT | Performed by: FAMILY MEDICINE

## 2024-02-29 PROCEDURE — 3074F SYST BP LT 130 MM HG: CPT | Performed by: FAMILY MEDICINE

## 2024-02-29 PROCEDURE — 1036F TOBACCO NON-USER: CPT | Performed by: FAMILY MEDICINE

## 2024-02-29 PROCEDURE — 3008F BODY MASS INDEX DOCD: CPT | Performed by: FAMILY MEDICINE

## 2024-02-29 RX ORDER — OMEPRAZOLE 40 MG/1
40 CAPSULE, DELAYED RELEASE ORAL
Qty: 30 CAPSULE | Refills: 11 | Status: SHIPPED | OUTPATIENT
Start: 2024-02-29 | End: 2025-02-28

## 2024-02-29 RX ORDER — BUPROPION HYDROCHLORIDE 150 MG/1
150 TABLET, EXTENDED RELEASE ORAL 2 TIMES DAILY
Qty: 60 TABLET | Refills: 1 | Status: SHIPPED | OUTPATIENT
Start: 2024-02-29 | End: 2024-04-04 | Stop reason: SDUPTHER

## 2024-02-29 ASSESSMENT — ENCOUNTER SYMPTOMS
POLYPHAGIA: 0
SHORTNESS OF BREATH: 0
SLEEP DISTURBANCE: 0
DIFFICULTY URINATING: 0
FATIGUE: 1
PALPITATIONS: 0
MYALGIAS: 0
HEADACHES: 0
BLOOD IN STOOL: 0
POLYDIPSIA: 0
DIZZINESS: 0

## 2024-02-29 NOTE — PATIENT INSTRUCTIONS
"You were referred to stomach specialist    Wean off celexa. Take 20mg x 1 week, then 20mg every other day x 1 week, then 20mg every 2 days for 1 week, then 20mg every 3 days for 1 week, then stop    Switch to omeprazole when weaned off celexa. Continue famotidine for now but take 20mg 2x per day. Continue to avoid \"NSAID meds.\"    Start wellbutrin and januvia    Stop ozempic    Go to the ER if any of your symptoms are significantly worsening.     Return in 4-6 weeks, sooner if needed      "

## 2024-02-29 NOTE — PROGRESS NOTES
"Subjective   Patient ID: Morro Seo is a 58 y.o. male who presents for Hospital Follow-up ([facility] on 2-19-24 RE: vomtiing blod).    HPI   Hematemesis: no recurrence. Did go to ER. Had nml bw so discharged on pepcid. Still having N and some vomiting. Feels fullness in stomach and \"food coming up\" at times. No triggers. Appetite down    Mood: having persistent depression and some anxiety. Would like alt med to SSRI. No SI/HI. Low motivation    DM: having trouble getting ozempic covered. Last A1c 8.6%    Review of Systems   Constitutional:  Positive for fatigue.   Eyes:  Negative for visual disturbance.   Respiratory:  Negative for shortness of breath.    Cardiovascular:  Negative for chest pain and palpitations.   Gastrointestinal:  Negative for blood in stool.   Endocrine: Negative for cold intolerance, polydipsia, polyphagia and polyuria.   Genitourinary:  Negative for difficulty urinating.   Musculoskeletal:  Negative for myalgias.   Skin:  Negative for rash.   Neurological:  Negative for dizziness and headaches.   Psychiatric/Behavioral:  Negative for sleep disturbance.        Objective   /70   Pulse 68   Temp 35.9 °C (96.7 °F)   Wt 142 kg (312 lb)   SpO2 96%   BMI 44.77 kg/m²     Physical Exam  Vitals and nursing note reviewed.   Constitutional:       General: He is not in acute distress.     Appearance: Normal appearance. He is not toxic-appearing.   HENT:      Head: Normocephalic.   Eyes:      Pupils: Pupils are equal, round, and reactive to light.   Cardiovascular:      Rate and Rhythm: Normal rate and regular rhythm.      Heart sounds: No murmur heard.  Pulmonary:      Effort: Pulmonary effort is normal. No respiratory distress.      Breath sounds: Normal breath sounds.   Abdominal:      Palpations: Abdomen is soft.      Tenderness: There is no abdominal tenderness. There is no guarding.   Musculoskeletal:      Right lower leg: Edema (trace) present.      Left lower leg: Edema (trace) present. "   Skin:     General: Skin is warm.   Neurological:      General: No focal deficit present.      Mental Status: He is alert.      Cranial Nerves: No cranial nerve deficit.   Psychiatric:         Mood and Affect: Mood normal.         Assessment/Plan   Problem List Items Addressed This Visit             ICD-10-CM    Type 2 diabetes mellitus, without long-term current use of insulin (CMS/[facility]) - Primary E11.9    Relevant Medications    SITagliptin phosphate (Januvia) 100 mg tablet    Depression F32.A    Relevant Medications    buPROPion SR (Wellbutrin SR) 150 mg 12 hr tablet     Other Visit Diagnoses         Codes    Hematemesis with nausea     K92.0    Relevant Medications    omeprazole (PriLOSEC) 40 mg DR capsule    Other Relevant Orders    Referral to Gastroenterology        Reviewed Er reports. Recommendations given

## 2024-03-11 ENCOUNTER — APPOINTMENT (OUTPATIENT)
Dept: RADIOLOGY | Facility: CLINIC | Age: 59
End: 2024-03-11
Payer: COMMERCIAL

## 2024-03-18 ENCOUNTER — APPOINTMENT (OUTPATIENT)
Dept: HEMATOLOGY/ONCOLOGY | Facility: CLINIC | Age: 59
End: 2024-03-18
Payer: COMMERCIAL

## 2024-03-20 DIAGNOSIS — D75.1 POLYCYTHEMIA: Primary | ICD-10-CM

## 2024-03-20 DIAGNOSIS — R60.0 LOCALIZED EDEMA: ICD-10-CM

## 2024-03-20 RX ORDER — ALBUTEROL SULFATE 0.83 MG/ML
3 SOLUTION RESPIRATORY (INHALATION) AS NEEDED
OUTPATIENT
Start: 2024-03-25

## 2024-03-20 RX ORDER — HEPARIN SODIUM,PORCINE/PF 10 UNIT/ML
50 SYRINGE (ML) INTRAVENOUS AS NEEDED
OUTPATIENT
Start: 2024-03-20

## 2024-03-20 RX ORDER — DIPHENHYDRAMINE HYDROCHLORIDE 50 MG/ML
50 INJECTION INTRAMUSCULAR; INTRAVENOUS AS NEEDED
OUTPATIENT
Start: 2024-03-25

## 2024-03-20 RX ORDER — HEPARIN 100 UNIT/ML
500 SYRINGE INTRAVENOUS AS NEEDED
OUTPATIENT
Start: 2024-03-20

## 2024-03-20 RX ORDER — FAMOTIDINE 10 MG/ML
20 INJECTION INTRAVENOUS ONCE AS NEEDED
OUTPATIENT
Start: 2024-03-25

## 2024-03-20 RX ORDER — EPINEPHRINE 0.3 MG/.3ML
0.3 INJECTION SUBCUTANEOUS EVERY 5 MIN PRN
OUTPATIENT
Start: 2024-03-25

## 2024-03-22 ENCOUNTER — TELEPHONE (OUTPATIENT)
Dept: PULMONOLOGY | Facility: HOSPITAL | Age: 59
End: 2024-03-22

## 2024-03-22 ENCOUNTER — OFFICE VISIT (OUTPATIENT)
Dept: PULMONOLOGY | Facility: HOSPITAL | Age: 59
End: 2024-03-22
Payer: COMMERCIAL

## 2024-03-22 VITALS
HEIGHT: 69 IN | BODY MASS INDEX: 46.06 KG/M2 | SYSTOLIC BLOOD PRESSURE: 122 MMHG | OXYGEN SATURATION: 95 % | TEMPERATURE: 98 F | RESPIRATION RATE: 16 BRPM | HEART RATE: 70 BPM | DIASTOLIC BLOOD PRESSURE: 66 MMHG | WEIGHT: 311 LBS

## 2024-03-22 DIAGNOSIS — J44.9 CHRONIC OBSTRUCTIVE PULMONARY DISEASE, UNSPECIFIED COPD TYPE (MULTI): Primary | ICD-10-CM

## 2024-03-22 DIAGNOSIS — F17.210 CIGARETTE SMOKER: ICD-10-CM

## 2024-03-22 DIAGNOSIS — G47.33 OSA (OBSTRUCTIVE SLEEP APNEA): ICD-10-CM

## 2024-03-22 PROCEDURE — 1036F TOBACCO NON-USER: CPT | Performed by: NURSE PRACTITIONER

## 2024-03-22 PROCEDURE — 3008F BODY MASS INDEX DOCD: CPT | Performed by: NURSE PRACTITIONER

## 2024-03-22 PROCEDURE — 99213 OFFICE O/P EST LOW 20 MIN: CPT | Performed by: NURSE PRACTITIONER

## 2024-03-22 PROCEDURE — 3052F HG A1C>EQUAL 8.0%<EQUAL 9.0%: CPT | Performed by: NURSE PRACTITIONER

## 2024-03-22 PROCEDURE — 3074F SYST BP LT 130 MM HG: CPT | Performed by: NURSE PRACTITIONER

## 2024-03-22 PROCEDURE — 3078F DIAST BP <80 MM HG: CPT | Performed by: NURSE PRACTITIONER

## 2024-03-22 PROCEDURE — 99213 OFFICE O/P EST LOW 20 MIN: CPT | Mod: ZK | Performed by: NURSE PRACTITIONER

## 2024-03-22 RX ORDER — TIOTROPIUM BROMIDE INHALATION SPRAY 3.12 UG/1
2 SPRAY, METERED RESPIRATORY (INHALATION) DAILY
Qty: 4 G | Refills: 11 | Status: SHIPPED | OUTPATIENT
Start: 2024-03-22

## 2024-03-22 RX ORDER — BUDESONIDE AND FORMOTEROL FUMARATE DIHYDRATE 160; 4.5 UG/1; UG/1
2 AEROSOL RESPIRATORY (INHALATION)
Qty: 10.2 G | Refills: 11 | Status: SHIPPED | OUTPATIENT
Start: 2024-03-22

## 2024-03-22 RX ORDER — BUDESONIDE, GLYCOPYRROLATE, AND FORMOTEROL FUMARATE 160; 9; 4.8 UG/1; UG/1; UG/1
2 AEROSOL, METERED RESPIRATORY (INHALATION)
Qty: 10.7 G | Refills: 11 | Status: SHIPPED | OUTPATIENT
Start: 2024-03-22 | End: 2024-03-22 | Stop reason: WASHOUT

## 2024-03-22 ASSESSMENT — ENCOUNTER SYMPTOMS
FEVER: 0
SHORTNESS OF BREATH: 1
CHILLS: 0
RHINORRHEA: 0
COUGH: 0
WHEEZING: 0
UNEXPECTED WEIGHT CHANGE: 0
FATIGUE: 0

## 2024-03-22 NOTE — PROGRESS NOTES
Subjective   Patient ID: Morro Seo is a 58 y.o. male who presents for follow up COPD.     HPI: Patient has  PMH of COPD was diagnosed around 2015 by Dr. Jefferson PCP in [place]. He had PFTs in June 2018 ordered by Dr. Bayron Allred showed FEV1 48-56%, %. He started smoking consistently at age of 19 and has smoked 2-3 ppd x 38 years and now since 2021 he has been smoking 1 to 1.5 ppd after he retired as a . He now notices coughing spells and shortness of breath on any activity. He has NAVYA and uses CPAP nightly. He denies any acute worsening but notes progressively worsening shortness of breath.      Today he is here for follow up. He states that his breathing has been stable. He is compliant with CPAP nightly. He has not gotten LDCT yet due to issues with insurance but getting it done soon. He is smoking 1 ppd. He has no other concerns.     Review of Systems   Constitutional:  Negative for chills, fatigue, fever and unexpected weight change.   HENT:  Negative for congestion, postnasal drip and rhinorrhea.    Respiratory:  Positive for shortness of breath. Negative for cough (denies hemoptysis.) and wheezing.    Cardiovascular:  Negative for chest pain and leg swelling.   All other systems reviewed and are negative.      Objective   Physical Exam  Vitals reviewed.   Constitutional:       Appearance: Normal appearance.   HENT:      Head: Normocephalic.   Cardiovascular:      Rate and Rhythm: Normal rate and regular rhythm.   Pulmonary:      Effort: Pulmonary effort is normal.      Breath sounds: Decreased breath sounds present.   Skin:     General: Skin is warm and dry.   Neurological:      Mental Status: He is alert.         Assessment/Plan   1. COPD  2. Chronic cor pulmonale  3. Tobacco dependence  4. NAVYA  5. Morbid Obesity  6. Right basal granuloma of lung 2020  7. Bilateral leg edema  8. Polycythemia  9. Hx of heavy alcohol use     Plan:  Patient appears to have clinically severe COPD besides morbid  obesity contributing to his TELLO. His prior PFTs in 2018 showed FEV1 48-56%, %. Repeat PFTs done with FEV1 46-51.  -6MWT done and he did not qualify for oxygen.   -He is smoking at 1 ppd and has smoked the past 40 years at 1-1.5 ppd.   -CXR done with pulmonary edema.   -LDCT reordered, he is trying to get this done.   -Will changing Stiolto to Breztri 2 puffs twice a day due to continued smoking and severe COPD.   -Continue albuterol as needed.  -continue to use CPAP nightly  -weight loss counseling done     Overall I will change to triple therapy at this time since he has been unsuccessful at smoking cessation. We scheduled LDCT for next week. I will bring him back with me in 6 months. I instructed patient to call sooner if needed.

## 2024-03-22 NOTE — PATIENT INSTRUCTIONS
Stop Stiolto and start on Breztri 2 puffs twice a day, everyday.  Continue on albuterol as needed.  Please get CT scan as scheduled.   Call with any questions or concerns.  Follow up with me in 6 months.

## 2024-03-22 NOTE — TELEPHONE ENCOUNTER
Called and spoke with patient and his wife regarding the inhaler change. Instructed him to take the inhalers as prescribed for 1 month and let us know how he likes it. Advised him to call us back with any further questions or concerns. Patient was agreeable to treatment plan and acknowledged understanding.   ----- Message from REJI Lugo sent at 3/22/2024  2:47 PM EDT -----  Regarding: RE: Inhaler Denial  Will you let him know that his insurance is making us split this into 2 inhalers, when he runs out of Stiolto lets start Symbicort 2 puffs twice a day and Spiriva 2 puffs once a day. I want to try him on this for at least a month to see if he notices improvement.   ----- Message -----  From: Lacy Cifuentes MA  Sent: 3/22/2024   2:22 PM EDT  To: REJI Lugo  Subject: Inhaler Denial                                   [facility] Insurance requires 2 failed preferred inhalers including: Anoro, Dulera, Advair, Symbicort, Spiriva or Stiolto

## 2024-03-25 ENCOUNTER — INFUSION (OUTPATIENT)
Dept: HEMATOLOGY/ONCOLOGY | Facility: CLINIC | Age: 59
End: 2024-03-25
Payer: COMMERCIAL

## 2024-03-25 ENCOUNTER — OFFICE VISIT (OUTPATIENT)
Dept: HEMATOLOGY/ONCOLOGY | Facility: CLINIC | Age: 59
End: 2024-03-25
Payer: COMMERCIAL

## 2024-03-25 VITALS
HEIGHT: 69 IN | TEMPERATURE: 97.5 F | HEART RATE: 78 BPM | DIASTOLIC BLOOD PRESSURE: 78 MMHG | OXYGEN SATURATION: 98 % | BODY MASS INDEX: 46.5 KG/M2 | SYSTOLIC BLOOD PRESSURE: 138 MMHG | RESPIRATION RATE: 18 BRPM | WEIGHT: 313.94 LBS

## 2024-03-25 DIAGNOSIS — D75.1 POLYCYTHEMIA: Primary | ICD-10-CM

## 2024-03-25 DIAGNOSIS — D75.1 POLYCYTHEMIA: ICD-10-CM

## 2024-03-25 LAB
ANION GAP SERPL CALC-SCNC: 10 MMOL/L (ref 10–20)
BASOPHILS # BLD AUTO: 0.03 X10*3/UL (ref 0–0.1)
BASOPHILS NFR BLD AUTO: 0.3 %
BUN SERPL-MCNC: 16 MG/DL (ref 6–23)
CALCIUM SERPL-MCNC: 9 MG/DL (ref 8.6–10.3)
CHLORIDE SERPL-SCNC: 102 MMOL/L (ref 98–107)
CO2 SERPL-SCNC: 30 MMOL/L (ref 21–32)
CREAT SERPL-MCNC: 0.78 MG/DL (ref 0.5–1.3)
EGFRCR SERPLBLD CKD-EPI 2021: >90 ML/MIN/1.73M*2
EOSINOPHIL # BLD AUTO: 0.28 X10*3/UL (ref 0–0.7)
EOSINOPHIL NFR BLD AUTO: 3 %
ERYTHROCYTE [DISTWIDTH] IN BLOOD BY AUTOMATED COUNT: 14.2 % (ref 11.5–14.5)
FERRITIN SERPL-MCNC: 96 NG/ML (ref 20–300)
GLUCOSE SERPL-MCNC: 200 MG/DL (ref 74–99)
HCT VFR BLD AUTO: 50.2 % (ref 41–52)
HGB BLD-MCNC: 17 G/DL (ref 13.5–17.5)
IMM GRANULOCYTES # BLD AUTO: 0.04 X10*3/UL (ref 0–0.7)
IMM GRANULOCYTES NFR BLD AUTO: 0.4 % (ref 0–0.9)
IRON SATN MFR SERPL: 25 % (ref 25–45)
IRON SERPL-MCNC: 77 UG/DL (ref 35–150)
LYMPHOCYTES # BLD AUTO: 1.73 X10*3/UL (ref 1.2–4.8)
LYMPHOCYTES NFR BLD AUTO: 18.8 %
MCH RBC QN AUTO: 30.2 PG (ref 26–34)
MCHC RBC AUTO-ENTMCNC: 33.9 G/DL (ref 32–36)
MCV RBC AUTO: 89 FL (ref 80–100)
MONOCYTES # BLD AUTO: 0.67 X10*3/UL (ref 0.1–1)
MONOCYTES NFR BLD AUTO: 7.3 %
NEUTROPHILS # BLD AUTO: 6.46 X10*3/UL (ref 1.2–7.7)
NEUTROPHILS NFR BLD AUTO: 70.2 %
PLATELET # BLD AUTO: 181 X10*3/UL (ref 150–450)
POTASSIUM SERPL-SCNC: 4.3 MMOL/L (ref 3.5–5.3)
RBC # BLD AUTO: 5.62 X10*6/UL (ref 4.5–5.9)
SODIUM SERPL-SCNC: 138 MMOL/L (ref 136–145)
TIBC SERPL-MCNC: 311 UG/DL (ref 240–445)
UIBC SERPL-MCNC: 234 UG/DL (ref 110–370)
WBC # BLD AUTO: 9.2 X10*3/UL (ref 4.4–11.3)

## 2024-03-25 PROCEDURE — 1036F TOBACCO NON-USER: CPT | Performed by: NURSE PRACTITIONER

## 2024-03-25 PROCEDURE — 80048 BASIC METABOLIC PNL TOTAL CA: CPT

## 2024-03-25 PROCEDURE — 3075F SYST BP GE 130 - 139MM HG: CPT | Performed by: NURSE PRACTITIONER

## 2024-03-25 PROCEDURE — 3008F BODY MASS INDEX DOCD: CPT | Performed by: NURSE PRACTITIONER

## 2024-03-25 PROCEDURE — 83540 ASSAY OF IRON: CPT

## 2024-03-25 PROCEDURE — 99214 OFFICE O/P EST MOD 30 MIN: CPT | Performed by: NURSE PRACTITIONER

## 2024-03-25 PROCEDURE — 3078F DIAST BP <80 MM HG: CPT | Performed by: NURSE PRACTITIONER

## 2024-03-25 PROCEDURE — 85025 COMPLETE CBC W/AUTO DIFF WBC: CPT

## 2024-03-25 PROCEDURE — 82728 ASSAY OF FERRITIN: CPT

## 2024-03-25 PROCEDURE — 36415 COLL VENOUS BLD VENIPUNCTURE: CPT

## 2024-03-25 PROCEDURE — 3052F HG A1C>EQUAL 8.0%<EQUAL 9.0%: CPT | Performed by: NURSE PRACTITIONER

## 2024-03-25 ASSESSMENT — PAIN SCALES - GENERAL: PAINLEVEL: 0-NO PAIN

## 2024-03-25 NOTE — PROGRESS NOTES
"Patient ID: Morro Seo is a 58 y.o. male.  Referring Physician: No referring provider defined for this encounter.  Primary Care Provider: Lisseth Thomas DO  Visit Type: Follow Up      Subjective    Chief Complaint: Erythrocytosis   Interval History:    Morro Seo is here today for interval follow-up and treatment of secondary polycythemia. He reports he is overall doing okay.  He has continued receiving his therapeutic  phlebotomies every 8 weeks and tolerating well. Hematocrit today 50.2%. He has chronic shortness of breath secondary to COPD and that remains unchanged. Denies any chest pain, abdominal pain, bowel habit changes. Denies any night sweats, fevers, chills. He denies any indications  of bleeding. Remains on low dosage aspirin daily.     Past Medical History:  Secondary polycythemia: Initial consultation for erythrocytosis with Dr. Galicia as CBC done in October 2021 showed WBC 11.3, RBC 6.05, Hgb 18.2, Hct 56.9, Plt 187. He has a history of COPD, sleep apnea  and nicotine dependence. Henrry-2 negative, serum erythropoietin level 10.1. He has been receiving treatment with therapeutic phlebotomies.     Hypertension, left ventricular hypertrophy, status post pacemaker placement due to AV block, COPD, sleep apnea and obesity. No prior history of MI, CVA or VTE.     Social History:  He is a . Current every day smoker (1-1.5 ppd).           Review of Systems:   Review of Systems:    A complete 12 point review of systems was conducted, otherwise normal or negative unless noted in HPI          Objective   BSA: 2.63 meters squared  /78 (BP Location: Right arm, Patient Position: Sitting, BP Cuff Size: Adult)   Pulse 78   Temp 36.4 °C (97.5 °F) (Temporal)   Resp 18   Ht 1.753 m (5' 9.02\")   Wt 142 kg (313 lb 15 oz)   SpO2 98%   BMI 46.34 kg/m²      has a past medical history of Complete heart block (CMS/HCC) (10/31/2023), Diverticulosis of intestine, part unspecified, without " perforation or abscess without bleeding (01/30/2020), Personal history of other endocrine, nutritional and metabolic disease (01/30/2020), Personal history of other endocrine, nutritional and metabolic disease (01/30/2020), Personal history of other specified conditions (05/04/2018), Personal history of pneumonia (recurrent) (05/08/2018), Solitary pulmonary nodule (05/04/2018), and Unspecified open wound of unspecified toe(s) without damage to nail, sequela (08/11/2020).   has a past surgical history that includes Other surgical history (04/28/2020); Other surgical history (04/28/2020); Other surgical history (04/28/2020); Other surgical history (04/28/2020); and Other surgical history (07/23/2020).  Family History   Problem Relation Name Age of Onset    Heart attack Mother      Hypertension Mother      Coronary artery disease Father      Heart attack Father      Hypertension Father      Diabetes Sister      Diabetes Brother      Lung cancer Maternal Grandfather      Lung cancer Paternal Grandfather       Oncology History    No history exists.       Morro Seo  reports that he has quit smoking. His smoking use included cigarettes. He smoked an average of 1 pack per day. He has never used smokeless tobacco.  He  reports that he does not currently use alcohol.  He  reports no history of drug use.    Physical Exam  HENT:      Head: Normocephalic.      Nose: Nose normal.      Mouth/Throat:      Mouth: Mucous membranes are moist.   Eyes:      Pupils: Pupils are equal, round, and reactive to light.   Cardiovascular:      Rate and Rhythm: Normal rate and regular rhythm.      Pulses: Normal pulses.      Heart sounds: Normal heart sounds.   Pulmonary:      Effort: Pulmonary effort is normal.      Breath sounds: Normal breath sounds.   Abdominal:      General: Bowel sounds are normal.      Palpations: Abdomen is soft.   Musculoskeletal:         General: Normal range of motion.   Skin:     General: Skin is warm and dry.    Neurological:      General: No focal deficit present.      Mental Status: He is alert and oriented to person, place, and time.   Psychiatric:         Mood and Affect: Mood normal.         Behavior: Behavior normal.       WBC   Date/Time Value Ref Range Status   03/25/2024 02:29 PM 9.2 4.4 - 11.3 x10*3/uL Final   02/19/2024 05:46 PM 9.6 4.4 - 11.3 x10*3/uL Final   11/15/2023 01:24 PM 8.3 4.4 - 11.3 x10*3/uL Final     nRBC   Date Value Ref Range Status   02/19/2024 0.0 0.0 - 0.0 /100 WBCs Final   11/15/2023 0.0 0.0 - 0.0 /100 WBCs Final   05/18/2023 CANCELED       Comment:     Result canceled by the ancillary.     RBC   Date Value Ref Range Status   03/25/2024 5.62 4.50 - 5.90 x10*6/uL Final   02/19/2024 5.76 4.50 - 5.90 x10*6/uL Final   11/15/2023 5.76 4.50 - 5.90 x10*6/uL Final     Hemoglobin   Date Value Ref Range Status   03/25/2024 17.0 13.5 - 17.5 g/dL Final   02/19/2024 17.2 13.5 - 17.5 g/dL Final   11/15/2023 17.2 13.5 - 17.5 g/dL Final     Hematocrit   Date Value Ref Range Status   03/25/2024 50.2 41.0 - 52.0 % Final   02/19/2024 51.2 41.0 - 52.0 % Final   11/15/2023 52.3 (H) 41.0 - 52.0 % Final     MCV   Date/Time Value Ref Range Status   03/25/2024 02:29 PM 89 80 - 100 fL Final   02/19/2024 05:46 PM 89 80 - 100 fL Final   11/15/2023 01:24 PM 91 80 - 100 fL Final     MCH   Date/Time Value Ref Range Status   03/25/2024 02:29 PM 30.2 26.0 - 34.0 pg Final   02/19/2024 05:46 PM 29.9 26.0 - 34.0 pg Final   11/15/2023 01:24 PM 29.9 26.0 - 34.0 pg Final     MCHC   Date/Time Value Ref Range Status   03/25/2024 02:29 PM 33.9 32.0 - 36.0 g/dL Final   02/19/2024 05:46 PM 33.6 32.0 - 36.0 g/dL Final   11/15/2023 01:24 PM 32.9 32.0 - 36.0 g/dL Final     RDW   Date/Time Value Ref Range Status   03/25/2024 02:29 PM 14.2 11.5 - 14.5 % Final   02/19/2024 05:46 PM 14.5 11.5 - 14.5 % Final   11/15/2023 01:24 PM 16.7 (H) 11.5 - 14.5 % Final     Platelets   Date/Time Value Ref Range Status   03/25/2024 02:29  150 - 450  "x10*3/uL Final   02/19/2024 05:46  150 - 450 x10*3/uL Final   11/15/2023 01:24  150 - 450 x10*3/uL Final     No results found for: \"MPV\"  Neutrophils %   Date/Time Value Ref Range Status   03/25/2024 02:29 PM 70.2 40.0 - 80.0 % Final   02/19/2024 05:46 PM 78.4 40.0 - 80.0 % Final   11/15/2023 01:24 PM 73.8 40.0 - 80.0 % Final     Immature Granulocytes %, Automated   Date/Time Value Ref Range Status   03/25/2024 02:29 PM 0.4 0.0 - 0.9 % Final     Comment:     Immature Granulocyte Count (IG) includes promyelocytes, myelocytes and metamyelocytes but does not include bands. Percent differential counts (%) should be interpreted in the context of the absolute cell counts (cells/UL).   02/19/2024 05:46 PM 0.5 0.0 - 0.9 % Final     Comment:     Immature Granulocyte Count (IG) includes promyelocytes, myelocytes and metamyelocytes but does not include bands. Percent differential counts (%) should be interpreted in the context of the absolute cell counts (cells/UL).   11/15/2023 01:24 PM 0.4 0.0 - 0.9 % Final     Comment:     Immature Granulocyte Count (IG) includes promyelocytes, myelocytes and metamyelocytes but does not include bands. Percent differential counts (%) should be interpreted in the context of the absolute cell counts (cells/UL).     Lymphocytes %   Date/Time Value Ref Range Status   03/25/2024 02:29 PM 18.8 13.0 - 44.0 % Final   02/19/2024 05:46 PM 12.3 13.0 - 44.0 % Final   11/15/2023 01:24 PM 16.2 13.0 - 44.0 % Final     Monocytes %   Date/Time Value Ref Range Status   03/25/2024 02:29 PM 7.3 2.0 - 10.0 % Final   02/19/2024 05:46 PM 5.7 2.0 - 10.0 % Final   11/15/2023 01:24 PM 7.1 2.0 - 10.0 % Final     Eosinophils %   Date/Time Value Ref Range Status   03/25/2024 02:29 PM 3.0 0.0 - 6.0 % Final   02/19/2024 05:46 PM 2.8 0.0 - 6.0 % Final   11/15/2023 01:24 PM 2.1 0.0 - 6.0 % Final     Basophils %   Date/Time Value Ref Range Status   03/25/2024 02:29 PM 0.3 0.0 - 2.0 % Final   02/19/2024 05:46 PM " 0.3 0.0 - 2.0 % Final   11/15/2023 01:24 PM 0.4 0.0 - 2.0 % Final     Neutrophils Absolute   Date/Time Value Ref Range Status   03/25/2024 02:29 PM 6.46 1.20 - 7.70 x10*3/uL Final     Comment:     Percent differential counts (%) should be interpreted in the context of the absolute cell counts (cells/uL).   02/19/2024 05:46 PM 7.54 1.20 - 7.70 x10*3/uL Final     Comment:     Percent differential counts (%) should be interpreted in the context of the absolute cell counts (cells/uL).   11/15/2023 01:24 PM 6.13 1.20 - 7.70 x10*3/uL Final     Comment:     Percent differential counts (%) should be interpreted in the context of the absolute cell counts (cells/uL).     Immature Granulocytes Absolute, Automated   Date/Time Value Ref Range Status   03/25/2024 02:29 PM 0.04 0.00 - 0.70 x10*3/uL Final   02/19/2024 05:46 PM 0.05 0.00 - 0.70 x10*3/uL Final   11/15/2023 01:24 PM 0.03 0.00 - 0.70 x10*3/uL Final     Lymphocytes Absolute   Date/Time Value Ref Range Status   03/25/2024 02:29 PM 1.73 1.20 - 4.80 x10*3/uL Final   02/19/2024 05:46 PM 1.18 (L) 1.20 - 4.80 x10*3/uL Final   11/15/2023 01:24 PM 1.34 1.20 - 4.80 x10*3/uL Final     Monocytes Absolute   Date/Time Value Ref Range Status   03/25/2024 02:29 PM 0.67 0.10 - 1.00 x10*3/uL Final   02/19/2024 05:46 PM 0.55 0.10 - 1.00 x10*3/uL Final   11/15/2023 01:24 PM 0.59 0.10 - 1.00 x10*3/uL Final     Eosinophils Absolute   Date/Time Value Ref Range Status   03/25/2024 02:29 PM 0.28 0.00 - 0.70 x10*3/uL Final   02/19/2024 05:46 PM 0.27 0.00 - 0.70 x10*3/uL Final   11/15/2023 01:24 PM 0.17 0.00 - 0.70 x10*3/uL Final         Assessment/Plan    Assessment:    1.  Erythrocytosis (jak2 negative)- secondary polycythemia due to COPD, smoking, sleep apnea, obesity. Therapeutic phlebotomies every 12  weeks, for hematocrit  greater than 50% tolerating well. H/H  today:      2   sleep apnea, COPD, nicotine dependence, uses CPAP. Not ready to quit smoking     3.  Morbid Obesity      4.   Hypertension      5.  Left ventricular hypertrophy     6.  Diabetes mellitus        Plan:  We will hold therapeutic phlebotomy today for hematocrit 50.1% since he is so close to goal. Discussed with patient: Because polycythemia caused by cardiopulmonary disease is an appropriate response to tissue hypoxia, attempts to reduce RBC mass by phlebotomy may exacerbate tissue hypoxia. Consequently, phlebotomy is not often utilized unless there is extreme elevation of Hct (eg, =65 percent) or symptoms attributable to increased blood volume/hyperviscosity (eg, fatigue, headache, blurred vision, transient loss of vision, paresthesias, slow mentation). In such settings, cautious phlebotomy (eg, removal of 250 mL replaced with an equal volume of crystalloid) to reduce Hct to 55 to 60 percent may provide relief of these symptoms; however, reduction of Hct to <55 percent range is likely to exacerbate dyspnea or other hypoxic symptoms.    Will continue therapeutic phlebotomies every 12  week for goal hematocrit <50%. He will return in 12 weeks for a phlebotomy, 6 months for follow-up visit with labs prior. He will continue  on low-dose aspirin daily. He will follow-up with his primary care provider and all other specialties as indicated. He will call office with any questions/concerns.        Total time =30 minutes. 50% or more of this time was spent in counseling and/or coordination of care including reviewing medical history/radiology/labs, examining patient, formulating outlined plan  with team, and discussing plan with patient/family.  I reviewed patient's chart including but not limited to labs, imaging, surgical/procedure notes, pathology, hospital notes, doctor's notes.                       Rosanne M Casal, APRN-CNP, DNP

## 2024-03-26 RX ORDER — TORSEMIDE 20 MG/1
20 TABLET ORAL DAILY
Qty: 90 TABLET | Refills: 1 | Status: SHIPPED | OUTPATIENT
Start: 2024-03-26 | End: 2024-04-26 | Stop reason: SDUPTHER

## 2024-03-28 ENCOUNTER — HOSPITAL ENCOUNTER (OUTPATIENT)
Dept: RADIOLOGY | Facility: CLINIC | Age: 59
Discharge: HOME | End: 2024-03-28
Payer: COMMERCIAL

## 2024-03-28 DIAGNOSIS — F17.210 NICOTINE DEPENDENCE, CIGARETTES, UNCOMPLICATED: ICD-10-CM

## 2024-03-28 PROCEDURE — 71271 CT THORAX LUNG CANCER SCR C-: CPT

## 2024-04-04 ENCOUNTER — TELEPHONE (OUTPATIENT)
Dept: PULMONOLOGY | Facility: HOSPITAL | Age: 59
End: 2024-04-04

## 2024-04-04 ENCOUNTER — OFFICE VISIT (OUTPATIENT)
Dept: PRIMARY CARE | Facility: CLINIC | Age: 59
End: 2024-04-04
Payer: COMMERCIAL

## 2024-04-04 VITALS
DIASTOLIC BLOOD PRESSURE: 64 MMHG | SYSTOLIC BLOOD PRESSURE: 116 MMHG | HEART RATE: 78 BPM | WEIGHT: 315 LBS | OXYGEN SATURATION: 93 % | BODY MASS INDEX: 46.64 KG/M2 | TEMPERATURE: 97.3 F

## 2024-04-04 DIAGNOSIS — I10 ESSENTIAL HYPERTENSION: ICD-10-CM

## 2024-04-04 DIAGNOSIS — M25.512 BILATERAL SHOULDER PAIN, UNSPECIFIED CHRONICITY: ICD-10-CM

## 2024-04-04 DIAGNOSIS — F33.0 MILD EPISODE OF RECURRENT MAJOR DEPRESSIVE DISORDER (CMS-HCC): ICD-10-CM

## 2024-04-04 DIAGNOSIS — E66.01 MORBID OBESITY (MULTI): ICD-10-CM

## 2024-04-04 DIAGNOSIS — M54.2 CERVICALGIA: ICD-10-CM

## 2024-04-04 DIAGNOSIS — J44.9 CHRONIC OBSTRUCTIVE PULMONARY DISEASE, UNSPECIFIED COPD TYPE (MULTI): ICD-10-CM

## 2024-04-04 DIAGNOSIS — E11.69 TYPE 2 DIABETES MELLITUS WITH OTHER SPECIFIED COMPLICATION, WITHOUT LONG-TERM CURRENT USE OF INSULIN (MULTI): Primary | ICD-10-CM

## 2024-04-04 DIAGNOSIS — E78.5 HYPERLIPIDEMIA, UNSPECIFIED HYPERLIPIDEMIA TYPE: ICD-10-CM

## 2024-04-04 DIAGNOSIS — M25.511 BILATERAL SHOULDER PAIN, UNSPECIFIED CHRONICITY: ICD-10-CM

## 2024-04-04 PROCEDURE — 99214 OFFICE O/P EST MOD 30 MIN: CPT | Performed by: FAMILY MEDICINE

## 2024-04-04 PROCEDURE — 3074F SYST BP LT 130 MM HG: CPT | Performed by: FAMILY MEDICINE

## 2024-04-04 PROCEDURE — 3052F HG A1C>EQUAL 8.0%<EQUAL 9.0%: CPT | Performed by: FAMILY MEDICINE

## 2024-04-04 PROCEDURE — 3008F BODY MASS INDEX DOCD: CPT | Performed by: FAMILY MEDICINE

## 2024-04-04 PROCEDURE — 3078F DIAST BP <80 MM HG: CPT | Performed by: FAMILY MEDICINE

## 2024-04-04 RX ORDER — BUPROPION HYDROCHLORIDE 150 MG/1
150 TABLET, EXTENDED RELEASE ORAL 2 TIMES DAILY
Qty: 180 TABLET | Refills: 0 | Status: SHIPPED | OUTPATIENT
Start: 2024-04-04 | End: 2024-07-03

## 2024-04-04 ASSESSMENT — ENCOUNTER SYMPTOMS
CONSTIPATION: 0
POLYPHAGIA: 0
SHORTNESS OF BREATH: 0
DYSURIA: 0
VOMITING: 0
BLOOD IN STOOL: 0
NAUSEA: 0
PALPITATIONS: 0
POLYDIPSIA: 0
DIFFICULTY URINATING: 0
DIARRHEA: 0
FATIGUE: 0
HEADACHES: 0
MYALGIAS: 0
DIZZINESS: 0
ABDOMINAL PAIN: 0
SLEEP DISTURBANCE: 0

## 2024-04-04 NOTE — PATIENT INSTRUCTIONS
Recommend a predominant low fat whole foods plant based diet.  Cut back on meat, dairy, processed carbs, salt and oils. Increase fiber in your diet.  Decrease alcohol as much as possible if you drink. Recommend regular exercise most days of the week(goal up to 150min per week). Also recommend good sleep habits aiming for 7-8 hours per night.     Start wellbutrin    Call to schedule with gastroenterology    Start januvia    Continue your other meds    Return in 3 months, sooner if needed    You were referred to PT

## 2024-04-04 NOTE — PROGRESS NOTES
Subjective   Patient ID: Morro Seo is a 58 y.o. male who presents for Anxiety, Diabetes, Hypertension, and Hyperlipidemia (Review BW)and multiple issues    HPI   Mood: weaned off lexapro but unsure if started wellbutrin. Mood ok. Wants to start med.     GERD: has not follow up with GI. No further hematemesis. Remains on PPI    DM: unsure if started januvia.    Wife out of town who helps pt w/ meds.     Neck/shoulder: onset for yrs. no recent injury. Having trouble reaching. Prn apap and NSAID helps. Would like to start PT.     Review of Systems   Constitutional:  Negative for fatigue.   Eyes:  Negative for visual disturbance.   Respiratory:  Negative for shortness of breath.    Cardiovascular:  Negative for chest pain and palpitations.   Gastrointestinal:  Negative for abdominal pain, blood in stool, constipation, diarrhea, nausea and vomiting.   Endocrine: Negative for polydipsia, polyphagia and polyuria.   Genitourinary:  Negative for difficulty urinating and dysuria.   Musculoskeletal:  Negative for myalgias.   Neurological:  Negative for dizziness and headaches.   Psychiatric/Behavioral:  Negative for sleep disturbance.        Objective   /64   Pulse 78   Temp 36.3 °C (97.3 °F)   Wt 143 kg (316 lb)   SpO2 93%   BMI 46.64 kg/m²     Physical Exam  Vitals and nursing note reviewed.   Constitutional:       General: He is not in acute distress.     Appearance: Normal appearance. He is not toxic-appearing.   HENT:      Head: Normocephalic.      Mouth/Throat:      Pharynx: Oropharynx is clear.   Eyes:      Pupils: Pupils are equal, round, and reactive to light.   Cardiovascular:      Rate and Rhythm: Normal rate and regular rhythm.      Heart sounds: No murmur heard.  Pulmonary:      Effort: Pulmonary effort is normal. No respiratory distress.      Breath sounds: Normal breath sounds.   Abdominal:      Palpations: Abdomen is soft.      Tenderness: There is no abdominal tenderness. There is no guarding.    Musculoskeletal:         General: No tenderness.      Right lower leg: Edema (trace) present.      Left lower leg: Edema (trace) present.      Comments: Dec ROM b/l shoulder abd and ADD/IR.    Skin:     General: Skin is warm.   Neurological:      General: No focal deficit present.      Mental Status: He is alert.      Cranial Nerves: No cranial nerve deficit.   Psychiatric:         Mood and Affect: Mood normal.         Assessment/Plan   Problem List Items Addressed This Visit             ICD-10-CM    Hyperlipidemia E78.5    Relevant Orders    Comprehensive Metabolic Panel    Type 2 diabetes mellitus, without long-term current use of insulin (CMS/[facility]) - Primary E11.9    Relevant Medications    SITagliptin phosphate (Januvia) 100 mg tablet    Other Relevant Orders    Comprehensive Metabolic Panel    Hemoglobin A1C    COPD (chronic obstructive pulmonary disease) (CMS/[facility]) J44.9    Depression F32.A    Relevant Medications    buPROPion SR (Wellbutrin SR) 150 mg 12 hr tablet    Essential hypertension I10    Relevant Orders    Comprehensive Metabolic Panel    Morbid obesity (CMS/[facility]) E66.01   Reviewed med list and bw with pt. Recommendations given. Discussed ROM exercises.

## 2024-04-04 NOTE — TELEPHONE ENCOUNTER
Spoke with patient regarding his results and let him know we would order the repeat CT on follow up. Instructed him to call us back with any further questions or concerns. Patient was agreeable to treatment plan and acknowledged understanding.   ----- Message from Gregory Brandon MD sent at 4/2/2024  5:01 PM EDT -----  No concerning nodules on CT chest. We will order a repeat CT on follow up and discuss further results.

## 2024-04-08 ENCOUNTER — EVALUATION (OUTPATIENT)
Dept: PHYSICAL THERAPY | Facility: HOSPITAL | Age: 59
End: 2024-04-08
Payer: COMMERCIAL

## 2024-04-08 DIAGNOSIS — M25.511 BILATERAL SHOULDER PAIN, UNSPECIFIED CHRONICITY: ICD-10-CM

## 2024-04-08 DIAGNOSIS — M54.2 CERVICALGIA: ICD-10-CM

## 2024-04-08 DIAGNOSIS — M25.512 BILATERAL SHOULDER PAIN, UNSPECIFIED CHRONICITY: ICD-10-CM

## 2024-04-08 DIAGNOSIS — R29.3 POOR POSTURE: Primary | ICD-10-CM

## 2024-04-08 PROCEDURE — 97110 THERAPEUTIC EXERCISES: CPT | Mod: GP | Performed by: PHYSICAL THERAPIST

## 2024-04-08 PROCEDURE — 97161 PT EVAL LOW COMPLEX 20 MIN: CPT | Mod: GP | Performed by: PHYSICAL THERAPIST

## 2024-04-08 ASSESSMENT — PAIN SCALES - GENERAL: PAINLEVEL_OUTOF10: 6

## 2024-04-08 ASSESSMENT — ENCOUNTER SYMPTOMS
DEPRESSION: 1
OCCASIONAL FEELINGS OF UNSTEADINESS: 1
LOSS OF SENSATION IN FEET: 1

## 2024-04-08 ASSESSMENT — PAIN - FUNCTIONAL ASSESSMENT: PAIN_FUNCTIONAL_ASSESSMENT: 0-10

## 2024-04-08 NOTE — PROGRESS NOTES
"Physical Therapy    Physical Therapy Evaluation    Patient Name: Morro Seo  MRN: [id number]  Today's Date: 4/8/2024  Time Calculation  Start Time: 1345  Stop Time: 1430  Time Calculation (min): 45 min    PT Evaluation Time Entry  PT Evaluation (Low) Time Entry: 37  PT Therapeutic Procedures Time Entry  Therapeutic Exercise Time Entry: 8                   Assessment  PT Assessment Results: Decreased range of motion, Decreased strength, Pain  Rehab Prognosis: Good  Evaluation/Treatment Tolerance: Patient tolerated treatment well  Pt. Is a 58 year old male with neck and ELVIS shld pain chronically. Pt. Is with decreased posture, decreased ROM, decreased scapular and UE strength. Pt. Is with decrease symptoms with cervical manual distraction. Pt. Would benefit from skilled PT to address the above issues.     Plan  Treatment/Interventions: Cryotherapy, Hot pack, Education/ Instruction, Self care/ home management, Therapeutic exercises, Therapeutic activities  PT Plan: Skilled PT  Rehab Potential: Good  Plan of Care Agreement: Patient       Current Problem  1. Poor posture        2. Cervicalgia  Referral to Physical Therapy    Follow Up In Physical Therapy      3. Bilateral shoulder pain, unspecified chronicity  Referral to Physical Therapy    Follow Up In Physical Therapy          Subjective   Pt. States he has had chronic neck and shoulder pain for years but it has just been getting worse. Pt. Does not remember any reason for his pain.   Pt. Does state he is with pain \"everywhere\".   Worst pain in neck and shoulders: 10/10  Pt. States he cannot check his blind spots well, and he cannot reach up his back to dress or walk.     General:  General  Reason for Referral: intitial eval  Referred By: Dr. William DO  Preferred Learning Style: verbal  Precautions:  Precautions  STEADI Fall Risk Score (The score of 4 or more indicates an increased risk of falling): 7 (fall on ice in winter)  Medical Precautions:  (DMII, heart " "disease, emphysema, lung disease, vascular disease - 1x / month gets transfusion in oncology; OA.)       Pain:  Pain Assessment: 0-10  Pain Score: 6  Pain Type: Chronic pain  Pain Location: Neck (\"everywhere\" all joints hurt)  Home Living:  wnl   Prior Function Per Pt/Caregiver Report:   Retired - .     Objective   Posture:  Forward head / uses scapular elevation to support head posture.      Range of Motion:     Cspine AROM  Flex 30  Ext 20  SB: 15  Rot: 30    UE AROM:  Flexion shoulder: 145  ER: wfl  IR: L1    Strength:  Scapula ELVIS : 4/5  ELVIS shld and elbow: 4+/5     Flexibility:     Palpation:  Decrease pain to manual distraction         Gait:.indep wfl        Other:  Visit 1: 4-8-24 Eval and HEP.     EXERCISES       Date       VISIT# # # # #    REPS REPS REPS REPS   Pulleys       flex       scap       IR ELVIS              Tband:       Row       Pull down       IR       ER              Chest str. door              Chin tuck into ball tiffanie.                            Mechanical tx                                                                                           HEP                 Outcome Measures:     NDI: 28    OP EDUCATION:  Access Code: [email or web address]  URL: [email or web address]  Date: 04/08/2024  Prepared by: Madonna Devries    Exercises  - Seated Scapular Retraction  - 5 x daily - 7 x weekly - 1 sets - 10 reps - 5sec hold  - Seated Cervical Retraction  - 5 x daily - 7 x weekly - 1 sets - 10 reps - 3sec hold    Outpatient Education  Individual(s) Educated: Patient  Education Provided: Home Exercise Program, POC  Risk and Benefits Discussed with Patient/Caregiver/Other: yes  Patient/Caregiver Demonstrated Understanding: yes  Plan of Care Discussed and Agreed Upon: yes  Patient Response to Education: Patient/Caregiver Verbalized Understanding of Information    Goals:  Active       Poor posture, cervicalgia, ELVIS shld pain       Pt. will c/o less than 2/10 neck and shoulder pain " with ADL's.        Start:  04/08/24    Expected End:  06/08/24            Pt. Will be indep with progressive HEP to cont. Progress made in PT.         Start:  04/08/24    Expected End:  07/08/24            Pt. will increase ELVIS shld AROM to wnl allow increase dressing.        Start:  04/08/24    Expected End:  07/08/24            Pt. will increase cpsine AROM to wfl to allow increase posture.        Start:  04/08/24    Expected End:  07/08/24

## 2024-04-10 ENCOUNTER — TREATMENT (OUTPATIENT)
Dept: PHYSICAL THERAPY | Facility: HOSPITAL | Age: 59
End: 2024-04-10
Payer: COMMERCIAL

## 2024-04-10 DIAGNOSIS — R29.3 POOR POSTURE: ICD-10-CM

## 2024-04-10 DIAGNOSIS — M25.511 BILATERAL SHOULDER PAIN, UNSPECIFIED CHRONICITY: Primary | ICD-10-CM

## 2024-04-10 DIAGNOSIS — M54.2 CERVICALGIA: ICD-10-CM

## 2024-04-10 DIAGNOSIS — M25.512 BILATERAL SHOULDER PAIN, UNSPECIFIED CHRONICITY: Primary | ICD-10-CM

## 2024-04-10 PROCEDURE — 97110 THERAPEUTIC EXERCISES: CPT | Mod: GP,CQ

## 2024-04-10 ASSESSMENT — PAIN - FUNCTIONAL ASSESSMENT: PAIN_FUNCTIONAL_ASSESSMENT: 0-10

## 2024-04-10 ASSESSMENT — PAIN SCALES - GENERAL: PAINLEVEL_OUTOF10: 6

## 2024-04-10 NOTE — PROGRESS NOTES
"Physical Therapy    Physical Therapy Treatment    Patient Name: Morro Seo  MRN: [id number]  Today's Date: 4/10/2024  Time Calculation  Start Time: 1300  Stop Time: 1343  Time Calculation (min): 43 min    PT Therapeutic Procedures Time Entry  Therapeutic Exercise Time Entry: 43        VISIT# 2    Current Problem  1. Bilateral shoulder pain, unspecified chronicity  Follow Up In Physical Therapy      2. Cervicalgia  Follow Up In Physical Therapy      3. Poor posture            Subjective   Pt reports he has neck and shoulder pain.  No falls reported     Precautions  Precautions  STEADI Fall Risk Score (The score of 4 or more indicates an increased risk of falling): 7  Medical Precautions:  (DMII, heart disease, emphysema, lung disease, vascular disease - 1x / month gets transfusion in oncology; OA.)       Pain  Pain Assessment: 0-10  Pain Score: 6  Pain Type: Chronic pain  Pain Location: Neck       Objective   Started land based therapy     Treatments:  Visit 1: 4-8-24 Eval and HEP.     EXERCISES       Date 4/10/24      VISIT# #2 # # #    REPS REPS REPS REPS   Pulleys       flex 3'      scap 3'      IR ELVIS 2' ea (MWM)             Tband:       Row 2 x 10 red      Pull down 2 x 10 red      IR elvis 2 x 10 red      ER elvis 2 x 10 red             Chest str. door              Chin tuck into ball tiffanie. 2 x 10 3\"H                           Mechanical tx Possibly next                           HEP           Assessment:   Pt has pain with IR.  Pt liked to stretch in supine with arms off to the side       Plan: possibly try cervical traction   OP PT Plan  Treatment/Interventions: Cryotherapy, Hot pack, Education/ Instruction, Self care/ home management, Therapeutic exercises, Therapeutic activities    Goals:  Active       Poor posture, cervicalgia, ELVIS shld pain       Pt. will c/o less than 2/10 neck and shoulder pain with ADL's.        Start:  04/08/24    Expected End:  06/08/24            Pt. Will be indep with progressive HEP " to cont. Progress made in PT.         Start:  04/08/24    Expected End:  07/08/24            Pt. will increase ELVIS shld AROM to wnl allow increase dressing.        Start:  04/08/24    Expected End:  07/08/24            Pt. will increase cpsine AROM to wfl to allow increase posture.        Start:  04/08/24    Expected End:  07/08/24

## 2024-04-15 ENCOUNTER — TREATMENT (OUTPATIENT)
Dept: PHYSICAL THERAPY | Facility: HOSPITAL | Age: 59
End: 2024-04-15
Payer: COMMERCIAL

## 2024-04-15 DIAGNOSIS — M25.511 BILATERAL SHOULDER PAIN, UNSPECIFIED CHRONICITY: ICD-10-CM

## 2024-04-15 DIAGNOSIS — M54.2 CERVICALGIA: ICD-10-CM

## 2024-04-15 DIAGNOSIS — M25.512 BILATERAL SHOULDER PAIN, UNSPECIFIED CHRONICITY: ICD-10-CM

## 2024-04-15 PROCEDURE — 97012 MECHANICAL TRACTION THERAPY: CPT | Mod: GP | Performed by: PHYSICAL THERAPIST

## 2024-04-15 PROCEDURE — 97110 THERAPEUTIC EXERCISES: CPT | Mod: GP | Performed by: PHYSICAL THERAPIST

## 2024-04-15 ASSESSMENT — PAIN SCALES - GENERAL: PAINLEVEL_OUTOF10: 6

## 2024-04-15 ASSESSMENT — PAIN - FUNCTIONAL ASSESSMENT: PAIN_FUNCTIONAL_ASSESSMENT: 0-10

## 2024-04-15 NOTE — PROGRESS NOTES
"Physical Therapy    Physical Therapy Treatment    Patient Name: Morro Seo  MRN: [id number]  Today's Date: 4/15/2024  Time Calculation  Start Time: 1430  Stop Time: 1515  Time Calculation (min): 45 min    PT Therapeutic Procedures Time Entry  Therapeutic Exercise Time Entry: 35  PT Modalities Time Entry  Mechanical Traction Time Entry: 10     VISIT# 3     Current Problem  1. Cervicalgia  Follow Up In Physical Therapy      2. Bilateral shoulder pain, unspecified chronicity  Follow Up In Physical Therapy          Subjective   Pt states his HEP is going well.      Precautions  Precautions  STEADI Fall Risk Score (The score of 4 or more indicates an increased risk of falling): 7  Medical Precautions:  (DMII, heart disease, emphysema, lung disease, vascular disease - 1x / month gets transfusion in oncology; OA.)       Pain  Pain Assessment: 0-10  Pain Score: 6  Pain Type: Chronic pain  Pain Location: Neck       Objective   Increased reps with tband  VC for head posture over shoulder.     Treatments:  Visit 1: 4-8-24 Eval and HEP.     EXERCISES       Date 4/10/24 4/15/24     VISIT# #2 #3 # #    REPS REPS REPS REPS   Pulleys       flex 3' 3'     scap 3' 3'     IR ELVIS 2' ea (MWM) 2' ea            Tband:       Row 2 x 10 red Red 15x2     Pull down 2 x 10 red R 15x2     IR elvis 2 x 10 red R 15x2     ER elvis 2 x 10 red R 15x2            Chest str. door  15\"x5            Chin tuck into ball tiffanie. 2 x 10 3\"H 15x1 3\"hold                          Mechanical tx Possibly next 20#/5# 15'  Red bolster under knees                          HEP           Assessment:  Mechanical traction decreases neck pressure. Mechanical traction performed on new machine and pt. Had difficulty keeping his head down in unit.        Plan: possibly try cervical traction other machine or use head strap.        Goals:  Active       Poor posture, cervicalgia, ELVIS shld pain       Pt. will c/o less than 2/10 neck and shoulder pain with ADL's.        Start:  " 04/08/24    Expected End:  06/08/24            Pt. Will be indep with progressive HEP to cont. Progress made in PT.   (Progressing)       Start:  04/08/24    Expected End:  07/08/24            Pt. will increase ELVIS shld AROM to wnl allow increase dressing.        Start:  04/08/24    Expected End:  07/08/24            Pt. will increase cpsine AROM to wfl to allow increase posture.        Start:  04/08/24    Expected End:  07/08/24

## 2024-04-16 PROBLEM — K92.0 HEMATEMESIS: Status: ACTIVE | Noted: 2024-04-16

## 2024-04-17 ENCOUNTER — APPOINTMENT (OUTPATIENT)
Dept: PHYSICAL THERAPY | Facility: HOSPITAL | Age: 59
End: 2024-04-17
Payer: COMMERCIAL

## 2024-04-19 ENCOUNTER — TREATMENT (OUTPATIENT)
Dept: PHYSICAL THERAPY | Facility: HOSPITAL | Age: 59
End: 2024-04-19
Payer: COMMERCIAL

## 2024-04-19 DIAGNOSIS — M25.512 BILATERAL SHOULDER PAIN, UNSPECIFIED CHRONICITY: ICD-10-CM

## 2024-04-19 DIAGNOSIS — M25.511 BILATERAL SHOULDER PAIN, UNSPECIFIED CHRONICITY: ICD-10-CM

## 2024-04-19 DIAGNOSIS — M54.2 CERVICALGIA: ICD-10-CM

## 2024-04-19 PROCEDURE — 97110 THERAPEUTIC EXERCISES: CPT | Mod: GP,CQ | Performed by: PHYSICAL THERAPY ASSISTANT

## 2024-04-19 PROCEDURE — 97140 MANUAL THERAPY 1/> REGIONS: CPT | Mod: GP,CQ | Performed by: PHYSICAL THERAPY ASSISTANT

## 2024-04-19 ASSESSMENT — PAIN SCALES - GENERAL: PAINLEVEL_OUTOF10: 4

## 2024-04-19 ASSESSMENT — PAIN - FUNCTIONAL ASSESSMENT: PAIN_FUNCTIONAL_ASSESSMENT: 0-10

## 2024-04-19 NOTE — PROGRESS NOTES
"Physical Therapy    Physical Therapy Treatment    Patient Name: Morro Seo  MRN: [id number]  Today's Date: 4/19/2024  Time Calculation  Start Time: 1300  Stop Time: 1345  Time Calculation (min): 45 min    PT Therapeutic Procedures Time Entry  Manual Therapy Time Entry: 15  Therapeutic Exercise Time Entry: 30        VISIT# 4    Current Problem  1. Cervicalgia  Follow Up In Physical Therapy      2. Bilateral shoulder pain, unspecified chronicity  Follow Up In Physical Therapy          Subjective      Pt reports increased pain after last session 9/10, attributes to mechanical traction. States he felt like he couldn't hold head up. Improved today to 4/10. Pt reports PCP took him off Celebrex and he feels this is contributing to pain.    Precautions  Precautions  STEADI Fall Risk Score (The score of 4 or more indicates an increased risk of falling): 7  Medical Precautions:  (DMII, heart disease, emphysema, lung disease, vascular disease - 1x / month gets transfusion in oncology; OA.)       Pain  Pain Assessment: 0-10  Pain Score: 4  Pain Type: Chronic pain  Pain Location: Neck       Objective   Pt does not want to try mech traction this date.      Treatments:  EXERCISES       Date 4/10/24 4/15/24 4/19/24    VISIT# #2 #3 #4 #    REPS REPS REPS REPS   Pulleys       flex 3' 3' 3'    scap 3' 3' 3'    IR ELVIS 2' ea (MWM) 2' ea 2' each           Tband:       Row 2 x 10 red Red 15x2 Red 2 x 15    Pull down 2 x 10 red R 15x2 Red 2 x 15    IR elvis 2 x 10 red R 15x2 Red 2 x 15    ER elvis 2 x 10 red R 15x2 Red 2 x 15           Chest str. door  15\"x5 20\" x 3 each mid and low           Chin tuck into ball tiffanie. 2 x 10 3\"H 15x1 3\"hold 3\" H 2 x 10                         Mechanical tx Possibly next 20#/5# 15'  Red bolster under knees Held    Manual   Cerv distract, occ release TPR, Myofascial 15'                  HEP            Assessment:   Pt responded favorably to manual therapy, reports reduction in pain and improved Cervical ROM " noted.     OP EDUCATION:   Pt provided info to purchase pulleys    Plan:   Progress manual therapy vs mech traction.    Goals:  Active       Poor posture, cervicalgia, ELVIS shld pain       Pt. will c/o less than 2/10 neck and shoulder pain with ADL's.        Start:  04/08/24    Expected End:  06/08/24            Pt. Will be indep with progressive HEP to cont. Progress made in PT.   (Progressing)       Start:  04/08/24    Expected End:  07/08/24            Pt. will increase ELVIS shld AROM to wnl allow increase dressing.        Start:  04/08/24    Expected End:  07/08/24            Pt. will increase cpsine AROM to wfl to allow increase posture.        Start:  04/08/24    Expected End:  07/08/24

## 2024-04-22 ENCOUNTER — TREATMENT (OUTPATIENT)
Dept: PHYSICAL THERAPY | Facility: HOSPITAL | Age: 59
End: 2024-04-22
Payer: COMMERCIAL

## 2024-04-22 DIAGNOSIS — M54.2 CERVICALGIA: Primary | ICD-10-CM

## 2024-04-22 DIAGNOSIS — M25.511 BILATERAL SHOULDER PAIN, UNSPECIFIED CHRONICITY: ICD-10-CM

## 2024-04-22 DIAGNOSIS — M25.512 BILATERAL SHOULDER PAIN, UNSPECIFIED CHRONICITY: ICD-10-CM

## 2024-04-22 PROCEDURE — 97110 THERAPEUTIC EXERCISES: CPT | Mod: GP,CQ

## 2024-04-22 ASSESSMENT — PAIN - FUNCTIONAL ASSESSMENT: PAIN_FUNCTIONAL_ASSESSMENT: 0-10

## 2024-04-22 ASSESSMENT — PAIN SCALES - GENERAL: PAINLEVEL_OUTOF10: 5 - MODERATE PAIN

## 2024-04-22 NOTE — PROGRESS NOTES
Physical Therapy    Physical Therapy Treatment    Patient Name: Morro Seo  MRN: [id number]  Today's Date: 4/22/2024  Time Calculation  Start Time: 1105  Stop Time: 1145  Time Calculation (min): 40 min    PT Therapeutic Procedures Time Entry  Therapeutic Exercise Time Entry: 40        VISIT# 5    Current Problem  1. Cervicalgia  Follow Up In Physical Therapy      2. Bilateral shoulder pain, unspecified chronicity  Follow Up In Physical Therapy          Subjective   Pt would like rubber bands for home.  Pt reports his pulleys are coming today     Precautions  Precautions  STEADI Fall Risk Score (The score of 4 or more indicates an increased risk of falling): 7  Medical Precautions:  (DMII, heart disease, emphysema, lung disease, vascular disease - 1x / month gets transfusion in oncology; OA.)       Pain  Pain Assessment: 0-10  Pain Score: 5 - Moderate pain  Pain Type: Chronic pain  Pain Location: Neck       Objective   Access Code: [email or web address]  URL: [email or web address]  Date: 04/22/2024  Prepared by: Gordon Hastings    Exercises  - Seated Shoulder Flexion AAROM with Pulley Behind  - 1-2 x daily - 5-7 x weekly - 3 sets - 10 reps  - Seated Shoulder Abduction AAROM with Pulley Behind  - 1-2 x daily - 5-7 x weekly - 3 sets - 10 reps  - Standing Shoulder Internal Rotation AAROM with Pulley  - 1-2 x daily - 5-7 x weekly - 3 sets - 10 reps  - Shoulder Extension with Resistance  - 1-2 x daily - 5-7 x weekly - 3 sets - 10 reps  - Standing Shoulder Row with Anchored Resistance  - 1-2 x daily - 5-7 x weekly - 3 sets - 10 reps  - Shoulder Internal Rotation with Resistance  - 1-2 x daily - 5-7 x weekly - 3 sets - 10 reps  - Shoulder External Rotation and Scapular Retraction with Resistance  - 1-2 x daily - 5-7 x weekly - 3 sets - 10 reps    Treatments:  EXERCISES       Date 4/10/24 4/15/24 4/19/24 4/22/24   VISIT# #2 #3 #4 #5    REPS REPS REPS REPS   Pulleys       flex 3' 3' 3' 3'   scap 3' 3' 3' 3'   IR  "ELVIS 2' ea (MWM) 2' ea 2' each 2' ea          Tband:       Row 2 x 10 red Red 15x2 Red 2 x 15 Gr 2 x 15    Pull down 2 x 10 red R 15x2 Red 2 x 15 Gr 2 x 15    IR elvis 2 x 10 red R 15x2 Red 2 x 15 Gr 2 x 15    ER elvis 2 x 10 red R 15x2 Red 2 x 15 Red 2 x 15          Chest str. door  15\"x5 20\" x 3 each mid and low 3 x 20\"  Single UE          Chin tuck into ball tiffanie. 2 x 10 3\"H 15x1 3\"hold 3\" H 2 x 10 3\" H 2 x 10                        Mechanical tx Possibly next 20#/5# 15'  Red bolster under knees Held    Manual   Cerv distract, occ release TPR, Myofascial 15'        Check ROM next visit          HEP           Assessment:   Pt understands new HEP and when to use each color rubber band.   Chest stretch was painful modified to single UE vs double through the door.  Educated pt on no pain no gain does not apply, if it hurts it is too much.    Outpatient Education  Individual(s) Educated: Patient  Education Provided: Home Exercise Program  Equipment: Thera-Band  Risk and Benefits Discussed with Patient/Caregiver/Other: yes  Patient/Caregiver Demonstrated Understanding: yes  Plan of Care Discussed and Agreed Upon: yes  Patient Response to Education: Patient/Caregiver Verbalized Understanding of Information, Patient/Caregiver Performed Return Demonstration of Exercises/Activities, Patient/Caregiver Asked Appropriate Questions  Education Comment: Access Code: [email or web address]        Plan: Check ROM  OP PT Plan  Treatment/Interventions: Cryotherapy, Hot pack, Education/ Instruction, Self care/ home management, Therapeutic exercises, Therapeutic activities    Goals:  Active       Poor posture, cervicalgia, ELVIS shld pain       Pt. will c/o less than 2/10 neck and shoulder pain with ADL's.        Start:  04/08/24    Expected End:  06/08/24            Pt. Will be indep with progressive HEP to cont. Progress made in PT.   (Progressing)       Start:  04/08/24    Expected End:  07/08/24            Pt. will increase ELVIS shld AROM to wnl allow " increase dressing.        Start:  04/08/24    Expected End:  07/08/24            Pt. will increase cpsine AROM to wfl to allow increase posture.        Start:  04/08/24    Expected End:  07/08/24

## 2024-04-23 ENCOUNTER — APPOINTMENT (OUTPATIENT)
Dept: GASTROENTEROLOGY | Facility: CLINIC | Age: 59
End: 2024-04-23
Payer: COMMERCIAL

## 2024-04-24 ENCOUNTER — TREATMENT (OUTPATIENT)
Dept: PHYSICAL THERAPY | Facility: HOSPITAL | Age: 59
End: 2024-04-24
Payer: COMMERCIAL

## 2024-04-24 DIAGNOSIS — M25.512 BILATERAL SHOULDER PAIN, UNSPECIFIED CHRONICITY: ICD-10-CM

## 2024-04-24 DIAGNOSIS — M25.511 BILATERAL SHOULDER PAIN, UNSPECIFIED CHRONICITY: ICD-10-CM

## 2024-04-24 DIAGNOSIS — M54.2 CERVICALGIA: ICD-10-CM

## 2024-04-24 PROCEDURE — 97110 THERAPEUTIC EXERCISES: CPT | Mod: GP,CQ

## 2024-04-24 NOTE — PROGRESS NOTES
"Physical Therapy    Physical Therapy Treatment    Patient Name: Morro Seo  MRN: [id number]  : 1965   Today's Date: 2024  Time Calculation  Start Time: 1110  Stop Time: 1200  Time Calculation (min): 50 min  Visit # 6    PT Therapeutic Procedures Time Entry  Therapeutic Exercise Time Entry: 45     Non-Billable Time  Non-billable time: 10    Current Problem  1. Cervicalgia  Follow Up In Physical Therapy      2. Bilateral shoulder pain, unspecified chronicity  Follow Up In Physical Therapy            Subjective   Pt states he has the normal 5/10 pain today        Precautions     STEADI Fall Risk Score (The score of 4 or more indicates an increased risk of falling): 7  Medical Precautions:  (DMII, heart disease, emphysema, lung disease, vascular disease - 1x / month gets transfusion in oncology; OA.)     Pain   5/10    Objective      Added exercises see flow sheet   Cervical SB   L 18*, R 19*    Treatments:  EXERCISES        Date 4/10/24 4/15/24 4/19/24 4/22/24 4/24/24   VISIT# #2 #3 #4 #5 #6    REPS REPS REPS REPS    Pulleys        flex 3' 3' 3' 3' 3'   scap 3' 3' 3' 3' 3'   IR ELVIS 2' ea (MWM) 2' ea 2' each 2' ea 2'ea           Tband:        Row 2 x 10 red Red 15x2 Red 2 x 15 Gr 2 x 15  HEP   Pull down 2 x 10 red R 15x2 Red 2 x 15 Gr 2 x 15  HEP   IR elvis 2 x 10 red R 15x2 Red 2 x 15 Gr 2 x 15  HEP   ER elvis 2 x 10 red R 15x2 Red 2 x 15 Red 2 x 15 HEP           Chest str. door  15\"x5 20\" x 3 each mid and low 3 x 20\"  Single UE 3 x 20\"  Single UE           Chin tuck into ball tiffanie. 2 x 10 3\"H 15x1 3\"hold 3\" H 2 x 10 3\" H 2 x 10 3\" H 2 x 10           Ball on wall CW, CCW     X20 ea, elvis UE           90* with weight flex, scap     2# 2x10 ea   Serratus punches     2# 2x10 ea   Sidelying abd     2# 2x10 ea   Sidelying ER     2# 2x10 ea   Mechanical tx Possibly next 20#/5# 15'  Red bolster under knees Held     Manual   Cerv distract, occ release TPR, Myofascial 15'         Check ROM next visit            HEP    "       Assessment:  Pt reports cervical ROM to left side is more tight. No increased pain with added exercises.        Plan:   Cont with POC to decrease pain     OP EDUCATION:       Goals:  Active       Poor posture, cervicalgia, ELVIS shld pain       Pt. will c/o less than 2/10 neck and shoulder pain with ADL's.        Start:  04/08/24    Expected End:  06/08/24            Pt. Will be indep with progressive HEP to cont. Progress made in PT.   (Progressing)       Start:  04/08/24    Expected End:  07/08/24            Pt. will increase ELVIS shld AROM to wnl allow increase dressing.        Start:  04/08/24    Expected End:  07/08/24            Pt. will increase cpsine AROM to wfl to allow increase posture.        Start:  04/08/24    Expected End:  07/08/24

## 2024-04-24 NOTE — PROGRESS NOTES
"Primary Cardiologist: Dr. Núñez    Chief Complaint:   6 month follow up     History Of Present Illness:    Morro Seo is a 58 y.o. male with past medical history of second-degree AV block and complete heart block now s/p PPM, chronic diastolic heart failure, hypertension, dyslipidemia, type 2 diabetes mellitus, polycythemia, sleep apnea on CPAP, smoking and obesity who presents today for follow up.     Today, Morro Seo is feeling improved since last being seen in clinic. Reports that he has cut back on his cigarettes to 0.5 ppd, lower extremity swelling has been stable. Recently found to have severe coronary artery calcifications on CT scan with calcium score of 1719.     Denies chest pain/pressure unless he is doing something really physical and then states he has some chest discomfort that \"feels like musculoskeletal pain\" ,  no dizziness or lightheadedness, no shortness of breath at rest but with exertion,  and no palpitations. He has stable lower extremity edema, denies orthopnea or PND.     Last Recorded Vitals:  Visit Vitals  /74 (BP Location: Right arm, Patient Position: Sitting, BP Cuff Size: Large adult)   Pulse 73   Ht 1.753 m (5' 9\")   Wt 141 kg (310 lb 12.8 oz)   SpO2 96%   BMI 45.90 kg/m²   Smoking Status Every Day   BSA 2.62 m²        Past Medical History:  He has a past medical history of Complete heart block (Multi) (10/31/2023), Diverticulosis of intestine, part unspecified, without perforation or abscess without bleeding (01/30/2020), Personal history of other endocrine, nutritional and metabolic disease (01/30/2020), Personal history of other endocrine, nutritional and metabolic disease (01/30/2020), Personal history of other specified conditions (05/04/2018), Personal history of pneumonia (recurrent) (05/08/2018), Solitary pulmonary nodule (05/04/2018), and Unspecified open wound of unspecified toe(s) without damage to nail, sequela (08/11/2020).    Past Surgical History:  He " has a past surgical history that includes Other surgical history (04/28/2020); Other surgical history (04/28/2020); Other surgical history (04/28/2020); Other surgical history (04/28/2020); and Other surgical history (07/23/2020).      Social History:  He reports that he has been smoking cigarettes. He has never used smokeless tobacco. He reports that he does not currently use alcohol. He reports that he does not use drugs.    Family History:  Family History   Problem Relation Name Age of Onset    Heart attack Mother      Hypertension Mother      Coronary artery disease Father      Heart attack Father      Hypertension Father      Diabetes Sister      Diabetes Brother      Lung cancer Maternal Grandfather      Lung cancer Paternal Grandfather          Allergies:  Jardiance [empagliflozin], Penicillins, and Sulfamethoxazole-trimethoprim    Outpatient Medications:  Current Outpatient Medications   Medication Instructions    acarbose (PRECOSE) 25 mg, oral, 3 times daily with meals    ammonium lactate (Lac-Hydrin) 12 % lotion USE 1 APPLICATION TO THE AFFECTED AREA EVERY NIGHT    aspirin 325 mg, oral, Daily    atorvastatin (LIPITOR) 80 mg, oral, Daily    budesonide-formoteroL (Symbicort) 160-4.5 mcg/actuation inhaler 2 puffs, inhalation, 2 times daily RT, Rinse mouth with water after use to reduce aftertaste and incidence of candidiasis. Do not swallow.    buPROPion SR (WELLBUTRIN SR) 150 mg, oral, 2 times daily, Do not crush, chew, or split.    ipratropium-albuteroL (Duo-Neb) 0.5-2.5 mg/3 mL nebulizer solution 3 mL, nebulization, Every 4 hours PRN    metFORMIN (GLUCOPHAGE) 1,000 mg, oral, 2 times daily with meals    omeprazole (PRILOSEC) 40 mg, oral, Daily before breakfast, Do not crush or chew.    potassium chloride CR 10 mEq ER tablet 10 mEq, oral, Daily    SITagliptin phosphate (JANUVIA) 100 mg, oral, Daily    spironolactone (ALDACTONE) 25 mg, oral, Daily    tiotropium (Spiriva Respimat) 2.5 mcg/actuation inhaler 2  puffs, inhalation, Daily    torsemide (DEMADEX) 20 mg, oral, Daily, as directed         Review of Systems   Constitutional: Negative for malaise/fatigue.   HENT: Negative.     Eyes: Negative.    Cardiovascular:  Positive for dyspnea on exertion and leg swelling (improved, stable). Negative for near-syncope, orthopnea, palpitations and syncope.   Respiratory:  Negative for shortness of breath.    Endocrine: Negative.    Hematologic/Lymphatic: Bruises/bleeds easily (easy bruising).   Skin: Negative.    Musculoskeletal:  Positive for arthritis, back pain, joint pain (bilateral hips/knees) and stiffness.   Gastrointestinal: Negative.    Genitourinary: Negative.    Neurological:  Negative for dizziness and light-headedness.        Physical Exam  Vitals reviewed.   Constitutional:       Appearance: Normal appearance.   HENT:      Head: Normocephalic.      Mouth/Throat:      Mouth: Mucous membranes are moist.   Cardiovascular:      Rate and Rhythm: Normal rate and regular rhythm.      Pulses: Normal pulses.      Heart sounds: Normal heart sounds.   Pulmonary:      Effort: Pulmonary effort is normal.      Breath sounds: Normal breath sounds. No wheezing, rhonchi or rales.   Abdominal:      General: Bowel sounds are normal. There is no distension.      Palpations: Abdomen is soft.      Tenderness: There is no abdominal tenderness.   Musculoskeletal:      Cervical back: Normal range of motion.      Right lower leg: Edema (trace) present.      Left lower leg: Edema (trace) present.   Skin:     General: Skin is warm and dry.      Comments: BLE coloring of venous stasis   Neurological:      General: No focal deficit present.      Mental Status: He is alert and oriented to person, place, and time.   Psychiatric:         Mood and Affect: Mood normal.         Behavior: Behavior normal.           Last Labs:  CBC -  Lab Results   Component Value Date    WBC 9.2 03/25/2024    HGB 17.0 03/25/2024    HCT 50.2 03/25/2024    MCV 89  "03/25/2024     03/25/2024       CMP -  Lab Results   Component Value Date    CALCIUM 9.0 03/25/2024    PROT 6.4 02/19/2024    ALBUMIN 4.0 02/19/2024    AST 18 02/19/2024    ALT 23 02/19/2024    ALKPHOS 92 02/19/2024    BILITOT 1.1 02/19/2024       LIPID PANEL -   Lab Results   Component Value Date    CHOL 76 11/15/2023    TRIG 148 11/15/2023    HDL 30.5 11/15/2023    CHHDL 2.5 11/15/2023    LDLF 30 03/28/2022    VLDL 30 11/15/2023    NHDL 46 11/15/2023       RENAL FUNCTION PANEL -   Lab Results   Component Value Date    GLUCOSE 200 (H) 03/25/2024     03/25/2024    K 4.3 03/25/2024     03/25/2024    CO2 30 03/25/2024    ANIONGAP 10 03/25/2024    BUN 16 03/25/2024    CREATININE 0.78 03/25/2024    GFRMALE >90 09/18/2023    CALCIUM 9.0 03/25/2024    ALBUMIN 4.0 02/19/2024        Lab Results   Component Value Date    BNP 11 08/04/2023    HGBA1C 8.6 (H) 02/19/2024       Last Cardiology Tests:  ECG:    Echo:  TTE 5/18/23: Left ventricular systolic function is normal with a 60-65% estimated ejection fraction.  2. Spectral Doppler shows a pseudonormal pattern of left ventricular diastolic filling.  3. There is moderate concentric left ventricular hypertrophy.  4. Moderately enlarged right ventricle.     TTE 1/31/22: The left ventricular systolic function is normal with a 60-65% estimated ejection fraction.  2. Moderately enlarged right ventricle.  3. The left atrium is moderately dilated.  4. Aortic valve sclerosis.   No results found for this or any previous visit from the past 1095 days.      Ejection Fractions:  No results found for: \"EF\"    Cath:  No results found for this or any previous visit from the past 1095 days.      Stress Test:  Nuclear Stress test 1/13/20:1. Normal stress myocardial perfusion imaging in response to  pharmacologic stress.  2. Well-maintained left ventricular function.       Cardiac Imaging:  No results found for this or any previous visit from the past 1095 days.        Lab " "review: I have personally reviewed the laboratory result(s)     Assessment/Plan     Morro Seo is a 58 y.o. male with past medical history of second-degree AV block and complete heart block now s/p PPM, chronic diastolic heart failure, hypertension, dyslipidemia, type 2 diabetes mellitus, polycythemia, sleep apnea on CPAP, smoking and obesity who presents today for follow up.     Chronic diastolic heart failure  TTE 5/18/23:TTE 5/18/23: Left ventricular systolic function is normal with a 60-65% estimated ejection fraction.Spectral Doppler shows a pseudonormal pattern of left ventricular diastolic filling.There is moderate concentric left ventricular hypertrophy. Moderately enlarged right ventricle.  Chest x-ray 5/12/23: Mild pulmonary edema. No consolidation seen  BNP 5/10/23: 26  Today, patient has stable lower extremity edema, non pitting  Continue on torsemide, spironolactone  Recent BMP with stable kidney function, Cr 0.78  Following with podiatry for left leg edema/wound     Severe coronary artery calcifications  As seen on CT chest with elevated Ca score of 1719  Patient reports occasional chest pain with \"severe exertion but I believe it to be musculoskeletal\" Difficult for him to tease out additional characteristics  Patient has chronic shortness of breath on exertion  Has significant risk factors for CAD, last ischemic eval in 2020 with normal MPI stress.  Will order NM Stress test today for further evaluation, patient can not walk on treadmill d/t foot issues and joint pain.  LDL is at goal  Blood pressure is well controlled  Continue on ASA, high-intensity statin    Complete heart block  s/p PPM  Follows with device clinic     Hypertensive heart disease  TTE with mild-mod concentric LVH  Today BP in office 127/74  Taken off lisinopril per PCP  Follow up echo ordered prior to next OP visit with Dr. Núñez -11/24     Nonhealing lower extremity ulcer  Recent surgery to Rt foot   ANNA was " normal.  Following with Podiatry     Tobacco use  Smokes 0.5 ppd which has improved since last visit, congratulated on effort and patient will continue to try and cut back.        Bre Sosa, APRN-CNP

## 2024-04-26 ENCOUNTER — OFFICE VISIT (OUTPATIENT)
Dept: CARDIOLOGY | Facility: CLINIC | Age: 59
End: 2024-04-26
Payer: COMMERCIAL

## 2024-04-26 VITALS
OXYGEN SATURATION: 96 % | WEIGHT: 310.8 LBS | HEIGHT: 69 IN | SYSTOLIC BLOOD PRESSURE: 127 MMHG | HEART RATE: 73 BPM | BODY MASS INDEX: 46.03 KG/M2 | DIASTOLIC BLOOD PRESSURE: 74 MMHG

## 2024-04-26 DIAGNOSIS — I50.32 CHRONIC DIASTOLIC (CONGESTIVE) HEART FAILURE (MULTI): ICD-10-CM

## 2024-04-26 DIAGNOSIS — G47.33 OBSTRUCTIVE SLEEP APNEA: ICD-10-CM

## 2024-04-26 DIAGNOSIS — I51.7 LEFT VENTRICULAR HYPERTROPHY: ICD-10-CM

## 2024-04-26 DIAGNOSIS — R60.0 LOCALIZED EDEMA: ICD-10-CM

## 2024-04-26 DIAGNOSIS — Z95.0 STATUS CARDIAC PACEMAKER: ICD-10-CM

## 2024-04-26 DIAGNOSIS — I25.10 CORONARY ARTERY CALCIFICATION SEEN ON CT SCAN: ICD-10-CM

## 2024-04-26 DIAGNOSIS — I10 ESSENTIAL HYPERTENSION: ICD-10-CM

## 2024-04-26 DIAGNOSIS — E78.5 HYPERLIPIDEMIA, UNSPECIFIED HYPERLIPIDEMIA TYPE: Primary | ICD-10-CM

## 2024-04-26 PROCEDURE — 99214 OFFICE O/P EST MOD 30 MIN: CPT | Performed by: CLINICAL NURSE SPECIALIST

## 2024-04-26 PROCEDURE — 3078F DIAST BP <80 MM HG: CPT | Performed by: CLINICAL NURSE SPECIALIST

## 2024-04-26 PROCEDURE — 3052F HG A1C>EQUAL 8.0%<EQUAL 9.0%: CPT | Performed by: CLINICAL NURSE SPECIALIST

## 2024-04-26 PROCEDURE — 3074F SYST BP LT 130 MM HG: CPT | Performed by: CLINICAL NURSE SPECIALIST

## 2024-04-26 RX ORDER — REGADENOSON 0.08 MG/ML
0.4 INJECTION, SOLUTION INTRAVENOUS
OUTPATIENT
Start: 2024-04-26

## 2024-04-26 RX ORDER — SPIRONOLACTONE 25 MG/1
25 TABLET ORAL DAILY
Qty: 90 TABLET | Refills: 3 | Status: SHIPPED | OUTPATIENT
Start: 2024-04-26 | End: 2025-04-26

## 2024-04-26 RX ORDER — TORSEMIDE 20 MG/1
20 TABLET ORAL DAILY
Qty: 90 TABLET | Refills: 3 | Status: SHIPPED | OUTPATIENT
Start: 2024-04-26 | End: 2025-04-26

## 2024-04-26 ASSESSMENT — ENCOUNTER SYMPTOMS
PALPITATIONS: 0
SHORTNESS OF BREATH: 0
EYES NEGATIVE: 1
SYNCOPE: 0
NEAR-SYNCOPE: 0
STIFFNESS: 1
BACK PAIN: 1
BRUISES/BLEEDS EASILY: 1
DIZZINESS: 0
GASTROINTESTINAL NEGATIVE: 1
DYSPNEA ON EXERTION: 1
LIGHT-HEADEDNESS: 0
ORTHOPNEA: 0
ENDOCRINE NEGATIVE: 1

## 2024-04-26 NOTE — PATIENT INSTRUCTIONS
A pharmacologic nuclear stress test has been ordered to further evaluate your coronary artery calcifications found on recent CT and shortness of breath with exertion.   Call our office with any new cardiac concerns  Continue current medications  Continue heart-healthy diet. A diet low in sodium, low in cholesterol, limiting red meats and eating whole foods.   Follow up with Dr. Núñez in 6 months

## 2024-04-29 ENCOUNTER — DOCUMENTATION (OUTPATIENT)
Dept: PHYSICAL THERAPY | Facility: HOSPITAL | Age: 59
End: 2024-04-29
Payer: COMMERCIAL

## 2024-04-29 NOTE — PROGRESS NOTES
Physical Therapy                 Therapy Communication Note    Patient Name: Morro Seo  MRN: [id number]  Today's Date: 2024   : 1965      Discipline: Physical Therapy    Missed Visit Reason:     Missed Time: no show    Comment: This PT left a message on machine for pt. To call into therapy as this is his last scheduled visit.

## 2024-05-10 ENCOUNTER — APPOINTMENT (OUTPATIENT)
Dept: CARDIOLOGY | Facility: CLINIC | Age: 59
End: 2024-05-10
Payer: COMMERCIAL

## 2024-05-27 DIAGNOSIS — I51.7 LEFT VENTRICULAR HYPERTROPHY: ICD-10-CM

## 2024-05-27 DIAGNOSIS — I50.32 CHRONIC DIASTOLIC (CONGESTIVE) HEART FAILURE (MULTI): ICD-10-CM

## 2024-05-27 DIAGNOSIS — G47.33 OBSTRUCTIVE SLEEP APNEA: ICD-10-CM

## 2024-05-27 DIAGNOSIS — I10 ESSENTIAL HYPERTENSION: ICD-10-CM

## 2024-06-03 RX ORDER — POTASSIUM CHLORIDE 750 MG/1
10 TABLET, EXTENDED RELEASE ORAL DAILY
Qty: 90 TABLET | Refills: 1 | Status: SHIPPED | OUTPATIENT
Start: 2024-06-03

## 2024-06-04 ENCOUNTER — APPOINTMENT (OUTPATIENT)
Dept: CARDIOLOGY | Facility: HOSPITAL | Age: 59
End: 2024-06-04
Payer: COMMERCIAL

## 2024-06-11 ENCOUNTER — APPOINTMENT (OUTPATIENT)
Dept: CARDIOLOGY | Facility: HOSPITAL | Age: 59
End: 2024-06-11
Payer: COMMERCIAL

## 2024-06-11 ENCOUNTER — HOSPITAL ENCOUNTER (OUTPATIENT)
Dept: CARDIOLOGY | Facility: HOSPITAL | Age: 59
Discharge: HOME | End: 2024-06-11
Payer: COMMERCIAL

## 2024-06-11 DIAGNOSIS — Z95.0 PRESENCE OF CARDIAC PACEMAKER: ICD-10-CM

## 2024-06-13 ENCOUNTER — HOSPITAL ENCOUNTER (OUTPATIENT)
Dept: CARDIOLOGY | Facility: HOSPITAL | Age: 59
Discharge: HOME | End: 2024-06-13
Payer: COMMERCIAL

## 2024-06-13 DIAGNOSIS — Z95.0 PRESENCE OF CARDIAC PACEMAKER: Primary | ICD-10-CM

## 2024-06-13 PROCEDURE — 93296 REM INTERROG EVL PM/IDS: CPT

## 2024-06-19 ENCOUNTER — APPOINTMENT (OUTPATIENT)
Dept: PRIMARY CARE | Facility: CLINIC | Age: 59
End: 2024-06-19
Payer: COMMERCIAL

## 2024-06-19 VITALS
DIASTOLIC BLOOD PRESSURE: 70 MMHG | BODY MASS INDEX: 45.03 KG/M2 | HEART RATE: 68 BPM | HEIGHT: 69 IN | TEMPERATURE: 97.8 F | SYSTOLIC BLOOD PRESSURE: 110 MMHG | OXYGEN SATURATION: 95 % | WEIGHT: 304 LBS

## 2024-06-19 DIAGNOSIS — E78.5 HYPERLIPIDEMIA, UNSPECIFIED HYPERLIPIDEMIA TYPE: ICD-10-CM

## 2024-06-19 DIAGNOSIS — Z01.818 PREOPERATIVE CLEARANCE: ICD-10-CM

## 2024-06-19 DIAGNOSIS — I50.32 CHRONIC DIASTOLIC (CONGESTIVE) HEART FAILURE (MULTI): ICD-10-CM

## 2024-06-19 DIAGNOSIS — J44.9 CHRONIC OBSTRUCTIVE PULMONARY DISEASE, UNSPECIFIED COPD TYPE (MULTI): ICD-10-CM

## 2024-06-19 DIAGNOSIS — I10 ESSENTIAL HYPERTENSION: ICD-10-CM

## 2024-06-19 DIAGNOSIS — K03.81 NONTRAUMATIC BROKEN OR CRACKED TOOTH: ICD-10-CM

## 2024-06-19 DIAGNOSIS — E11.69 TYPE 2 DIABETES MELLITUS WITH OTHER SPECIFIED COMPLICATION, WITHOUT LONG-TERM CURRENT USE OF INSULIN (MULTI): Primary | ICD-10-CM

## 2024-06-19 PROCEDURE — 99214 OFFICE O/P EST MOD 30 MIN: CPT | Performed by: FAMILY MEDICINE

## 2024-06-19 PROCEDURE — 3078F DIAST BP <80 MM HG: CPT | Performed by: FAMILY MEDICINE

## 2024-06-19 PROCEDURE — 3074F SYST BP LT 130 MM HG: CPT | Performed by: FAMILY MEDICINE

## 2024-06-19 PROCEDURE — 3052F HG A1C>EQUAL 8.0%<EQUAL 9.0%: CPT | Performed by: FAMILY MEDICINE

## 2024-06-19 ASSESSMENT — ENCOUNTER SYMPTOMS
NAUSEA: 0
DIARRHEA: 0
VOMITING: 0
DYSPHORIC MOOD: 0
POLYPHAGIA: 0
DIZZINESS: 0
POLYDIPSIA: 0
DYSURIA: 0
SHORTNESS OF BREATH: 0
MYALGIAS: 0
SLEEP DISTURBANCE: 0
ABDOMINAL PAIN: 0
DIFFICULTY URINATING: 0
BLOOD IN STOOL: 0
HEADACHES: 0
FATIGUE: 0
PALPITATIONS: 0
CONSTIPATION: 0

## 2024-06-19 NOTE — PROGRESS NOTES
"Subjective   Patient ID: Morro Seo is a 59 y.o. male who presents for Pre-op Exam (Pre-Op Eval for Deep Dental cleaning with tooth extraction with [facility]- date TBD).    HPI   DM: sl high 8.6 several months ago.   HTN: controlled.  Polycythemia: stable. Seeing HEME/ONC.   COPD: stable. No new SOB  Lipids: have been stable and controlled.   HF: stable. Seeing cardiology    No bleeding issues w/ prior surgeries    Review of Systems   Constitutional:  Negative for fatigue.   HENT: Negative.     Eyes:  Negative for visual disturbance.   Respiratory:  Negative for shortness of breath.    Cardiovascular:  Negative for chest pain and palpitations.   Gastrointestinal:  Negative for abdominal pain, blood in stool, constipation, diarrhea, nausea and vomiting.   Endocrine: Negative for polydipsia, polyphagia and polyuria.   Genitourinary:  Negative for difficulty urinating and dysuria.   Musculoskeletal:  Negative for myalgias.   Skin:  Negative for rash.   Neurological:  Negative for dizziness and headaches.   Psychiatric/Behavioral:  Negative for dysphoric mood and sleep disturbance.        Objective   /70   Pulse 68   Temp 36.6 °C (97.8 °F)   Ht 1.753 m (5' 9\")   Wt 138 kg (304 lb)   SpO2 95%   BMI 44.89 kg/m²     Physical Exam  Vitals and nursing note reviewed.   Constitutional:       General: He is not in acute distress.     Appearance: Normal appearance. He is not toxic-appearing.   HENT:      Head: Normocephalic.      Right Ear: Tympanic membrane normal.      Left Ear: Tympanic membrane normal.      Nose: Nose normal.      Mouth/Throat:      Pharynx: Oropharynx is clear.   Eyes:      General: No scleral icterus.     Pupils: Pupils are equal, round, and reactive to light.   Neck:      Vascular: No carotid bruit.   Cardiovascular:      Rate and Rhythm: Normal rate and regular rhythm.      Heart sounds: No murmur heard.  Pulmonary:      Effort: Pulmonary effort is normal. No respiratory " distress.      Breath sounds: Normal breath sounds.   Abdominal:      Palpations: Abdomen is soft.      Tenderness: There is no abdominal tenderness. There is no guarding.   Musculoskeletal:         General: No tenderness.      Cervical back: Neck supple.      Right lower leg: No edema.      Left lower leg: No edema.   Lymphadenopathy:      Cervical: No cervical adenopathy.   Skin:     General: Skin is warm.   Neurological:      General: No focal deficit present.      Mental Status: He is alert.      Cranial Nerves: No cranial nerve deficit.   Psychiatric:         Mood and Affect: Mood normal.         Assessment/Plan   Problem List Items Addressed This Visit             ICD-10-CM    Hyperlipidemia E78.5    Type 2 diabetes mellitus, without long-term current use of insulin (Multi) - Primary E11.9    COPD (chronic obstructive pulmonary disease) (Multi) J44.9    Essential hypertension I10    Chronic diastolic (congestive) heart failure (Multi) I50.32     Other Visit Diagnoses         Codes    Preoperative clearance     Z01.818    Nontraumatic broken or cracked tooth     K03.81          Reviewed prior bw. Recommendations given. Recommend he try to schedule after upcoming bw to assure A1c stable as long as no sig worsening of tooth issues

## 2024-06-19 NOTE — PATIENT INSTRUCTIONS
Recommend a predominant low fat whole foods plant based diet.  Cut back on meat, dairy, processed carbs, salt and oils(especially palm and coconut). Increase fiber in your diet.  Decrease alcohol as much as possible if you drink. Recommend regular exercise most days of the week(goal up to 150min per week). Also recommend good sleep habits aiming for 7-8 hours per night.     Recommend you contact your blood specialist for any precautions for your dental procedure    It is important to control your blood sugar before your surgery    Return as scheduled, sooner if needed

## 2024-07-03 ENCOUNTER — LAB (OUTPATIENT)
Dept: LAB | Facility: LAB | Age: 59
End: 2024-07-03
Payer: COMMERCIAL

## 2024-07-03 DIAGNOSIS — I10 ESSENTIAL HYPERTENSION: ICD-10-CM

## 2024-07-03 DIAGNOSIS — E78.5 HYPERLIPIDEMIA, UNSPECIFIED HYPERLIPIDEMIA TYPE: ICD-10-CM

## 2024-07-03 DIAGNOSIS — E11.69 TYPE 2 DIABETES MELLITUS WITH OTHER SPECIFIED COMPLICATION, WITHOUT LONG-TERM CURRENT USE OF INSULIN (MULTI): ICD-10-CM

## 2024-07-03 LAB
ALBUMIN SERPL BCP-MCNC: 4.4 G/DL (ref 3.4–5)
ALP SERPL-CCNC: 106 U/L (ref 33–120)
ALT SERPL W P-5'-P-CCNC: 23 U/L (ref 10–52)
ANION GAP SERPL CALC-SCNC: 12 MMOL/L (ref 10–20)
AST SERPL W P-5'-P-CCNC: 17 U/L (ref 9–39)
BILIRUB SERPL-MCNC: 1.5 MG/DL (ref 0–1.2)
BUN SERPL-MCNC: 19 MG/DL (ref 6–23)
CALCIUM SERPL-MCNC: 9.7 MG/DL (ref 8.6–10.3)
CHLORIDE SERPL-SCNC: 96 MMOL/L (ref 98–107)
CO2 SERPL-SCNC: 31 MMOL/L (ref 21–32)
CREAT SERPL-MCNC: 0.85 MG/DL (ref 0.5–1.3)
EGFRCR SERPLBLD CKD-EPI 2021: >90 ML/MIN/1.73M*2
EST. AVERAGE GLUCOSE BLD GHB EST-MCNC: 255 MG/DL
GLUCOSE SERPL-MCNC: 274 MG/DL (ref 74–99)
HBA1C MFR BLD: 10.5 %
POTASSIUM SERPL-SCNC: 4.4 MMOL/L (ref 3.5–5.3)
PROT SERPL-MCNC: 6.6 G/DL (ref 6.4–8.2)
SODIUM SERPL-SCNC: 135 MMOL/L (ref 136–145)

## 2024-07-03 PROCEDURE — 80053 COMPREHEN METABOLIC PANEL: CPT

## 2024-07-03 PROCEDURE — 36415 COLL VENOUS BLD VENIPUNCTURE: CPT

## 2024-07-03 PROCEDURE — 83036 HEMOGLOBIN GLYCOSYLATED A1C: CPT

## 2024-07-08 ENCOUNTER — APPOINTMENT (OUTPATIENT)
Dept: PRIMARY CARE | Facility: CLINIC | Age: 59
End: 2024-07-08
Payer: COMMERCIAL

## 2024-07-08 VITALS
BODY MASS INDEX: 44.45 KG/M2 | OXYGEN SATURATION: 99 % | WEIGHT: 301 LBS | SYSTOLIC BLOOD PRESSURE: 118 MMHG | HEART RATE: 72 BPM | TEMPERATURE: 97.3 F | DIASTOLIC BLOOD PRESSURE: 72 MMHG

## 2024-07-08 DIAGNOSIS — E78.5 HYPERLIPIDEMIA, UNSPECIFIED HYPERLIPIDEMIA TYPE: ICD-10-CM

## 2024-07-08 DIAGNOSIS — E11.69 TYPE 2 DIABETES MELLITUS WITH OTHER SPECIFIED COMPLICATION, WITHOUT LONG-TERM CURRENT USE OF INSULIN (MULTI): ICD-10-CM

## 2024-07-08 DIAGNOSIS — E11.49 OTHER DIABETIC NEUROLOGICAL COMPLICATION ASSOCIATED WITH TYPE 2 DIABETES MELLITUS (MULTI): ICD-10-CM

## 2024-07-08 DIAGNOSIS — K21.01 GASTROESOPHAGEAL REFLUX DISEASE WITH ESOPHAGITIS AND HEMORRHAGE: ICD-10-CM

## 2024-07-08 DIAGNOSIS — E11.49 OTHER DIABETIC NEUROLOGICAL COMPLICATION ASSOCIATED WITH TYPE 2 DIABETES MELLITUS (MULTI): Primary | ICD-10-CM

## 2024-07-08 DIAGNOSIS — F33.0 MILD EPISODE OF RECURRENT MAJOR DEPRESSIVE DISORDER (CMS-HCC): ICD-10-CM

## 2024-07-08 PROCEDURE — 3046F HEMOGLOBIN A1C LEVEL >9.0%: CPT | Performed by: FAMILY MEDICINE

## 2024-07-08 PROCEDURE — 3074F SYST BP LT 130 MM HG: CPT | Performed by: FAMILY MEDICINE

## 2024-07-08 PROCEDURE — 99214 OFFICE O/P EST MOD 30 MIN: CPT | Performed by: FAMILY MEDICINE

## 2024-07-08 PROCEDURE — 3078F DIAST BP <80 MM HG: CPT | Performed by: FAMILY MEDICINE

## 2024-07-08 RX ORDER — ACARBOSE 50 MG/1
50 TABLET ORAL
Qty: 90 TABLET | Refills: 2 | Status: SHIPPED | OUTPATIENT
Start: 2024-07-08 | End: 2024-10-06

## 2024-07-08 RX ORDER — ATORVASTATIN CALCIUM 80 MG/1
80 TABLET, FILM COATED ORAL DAILY
Qty: 90 TABLET | Refills: 0 | Status: SHIPPED | OUTPATIENT
Start: 2024-07-08

## 2024-07-08 RX ORDER — BUPROPION HYDROCHLORIDE 150 MG/1
150 TABLET, EXTENDED RELEASE ORAL 2 TIMES DAILY
Qty: 180 TABLET | Refills: 0 | Status: SHIPPED | OUTPATIENT
Start: 2024-07-08 | End: 2024-10-06

## 2024-07-08 RX ORDER — GABAPENTIN 100 MG/1
CAPSULE ORAL
Qty: 60 CAPSULE | Refills: 0 | Status: SHIPPED | OUTPATIENT
Start: 2024-07-08 | End: 2024-07-08

## 2024-07-08 RX ORDER — GABAPENTIN 100 MG/1
CAPSULE ORAL
Qty: 60 CAPSULE | Refills: 0 | Status: SHIPPED | OUTPATIENT
Start: 2024-07-08

## 2024-07-08 ASSESSMENT — ENCOUNTER SYMPTOMS
DIZZINESS: 0
FATIGUE: 0
DIFFICULTY URINATING: 0
DIARRHEA: 0
CONSTIPATION: 0
POLYPHAGIA: 0
NAUSEA: 0
MYALGIAS: 0
DYSURIA: 0
VOMITING: 0
HYPERTENSION: 1
BLOOD IN STOOL: 0
PALPITATIONS: 0
POLYDIPSIA: 1
ABDOMINAL PAIN: 0
SHORTNESS OF BREATH: 0
HEADACHES: 0

## 2024-07-08 NOTE — PATIENT INSTRUCTIONS
Recommend a predominant low fat whole foods plant based diet.  Cut back on meat, dairy, processed carbs, salt and oils(especially palm and coconut). Increase fiber in your diet.  Decrease alcohol as much as possible if you drink. Recommend regular exercise most days of the week(goal up to 150min per week). Also recommend good sleep habits aiming for 7-8 hours per night.     Increase acarbose and start gabapentin. Use lowest dose of gabapentin to control your symptoms    Continue your other meds    You were referred to clinical pharmacy and stomach specialist    Follow up in 1 month, sooner if needed

## 2024-07-08 NOTE — PROGRESS NOTES
"Subjective   Patient ID: Morro Seo is a 59 y.o. male who presents for Diabetes (Recheck. Review BW), Hypertension, and Hyperlipidemia and multiple issues.     Diabetes  Pertinent negatives for hypoglycemia include no dizziness or headaches. Associated symptoms include polydipsia and polyuria. Pertinent negatives for diabetes include no chest pain, no fatigue and no polyphagia.   Hypertension  Pertinent negatives include no chest pain, headaches, palpitations or shortness of breath.   Hyperlipidemia  Pertinent negatives include no chest pain, myalgias or shortness of breath.      DM: uncontrolled. A1c 10.5(8.6). pt did not double acarbose. Refusing insulin. Was on ozempic in past. Felt helped but could not get med  Lipids: has been stable  HTN: controlled.   Mood: chronic but stable. Has some bad days but stable on med. Got off SSRI. No longer seemed to help.   Neuropathy: starting to have tingling and \"zingers\" in his legs. Worse at night. Not sleep well at times. \"Hurts all over\" since stopping celebrex. Stopped due to GI bld. Never follow up w/ GI.   GERD: stable on PPI.     Review of Systems   Constitutional:  Negative for fatigue.   Eyes:  Negative for visual disturbance.   Respiratory:  Negative for shortness of breath.    Cardiovascular:  Negative for chest pain and palpitations.   Gastrointestinal:  Negative for abdominal pain, blood in stool, constipation, diarrhea, nausea and vomiting.   Endocrine: Positive for polydipsia and polyuria. Negative for cold intolerance, heat intolerance and polyphagia.   Genitourinary:  Negative for difficulty urinating and dysuria.   Musculoskeletal:  Negative for myalgias.   Neurological:  Negative for dizziness and headaches.       Objective   /72   Pulse 72   Temp 36.3 °C (97.3 °F)   Wt 137 kg (301 lb)   SpO2 99%   BMI 44.45 kg/m²     Physical Exam  Vitals and nursing note reviewed.   Constitutional:       General: He is not in acute distress.     " Appearance: Normal appearance. He is not toxic-appearing.   HENT:      Head: Normocephalic.   Eyes:      General: No scleral icterus.     Pupils: Pupils are equal, round, and reactive to light.   Neck:      Vascular: No carotid bruit.   Cardiovascular:      Rate and Rhythm: Normal rate and regular rhythm.      Heart sounds: No murmur heard.  Pulmonary:      Effort: Pulmonary effort is normal. No respiratory distress.      Breath sounds: Normal breath sounds.   Abdominal:      Palpations: Abdomen is soft.      Tenderness: There is no abdominal tenderness. There is no guarding.   Musculoskeletal:      Right lower leg: No edema.      Left lower leg: No edema.   Skin:     General: Skin is warm.   Neurological:      General: No focal deficit present.      Mental Status: He is alert.      Cranial Nerves: No cranial nerve deficit.   Psychiatric:         Mood and Affect: Mood normal.         Assessment/Plan   Problem List Items Addressed This Visit             ICD-10-CM    Hyperlipidemia E78.5    Relevant Medications    atorvastatin (Lipitor) 80 mg tablet    Type 2 diabetes mellitus, without long-term current use of insulin (Multi) E11.9    Relevant Medications    acarbose (Precose) 50 mg tablet    atorvastatin (Lipitor) 80 mg tablet    SITagliptin phosphate (Januvia) 100 mg tablet    Other Relevant Orders    Referral to Clinical Pharmacy    Depression F32.A    Relevant Medications    buPROPion SR (Wellbutrin SR) 150 mg 12 hr tablet     Other Visit Diagnoses         Codes    Other diabetic neurological complication associated with type 2 diabetes mellitus (Multi)    -  Primary E11.49    Relevant Medications    gabapentin (Neurontin) 100 mg capsule    Other Relevant Orders    Referral to Clinical Pharmacy    Gastroesophageal reflux disease with esophagitis and hemorrhage     K21.01    Relevant Orders    Referral to Gastroenterology        Reviewed bw and prior echo w/ pt. Recommendations given. Discussed side effect new  meds.

## 2024-07-12 ENCOUNTER — HOSPITAL ENCOUNTER (OUTPATIENT)
Dept: RADIOLOGY | Facility: HOSPITAL | Age: 59
Discharge: HOME | End: 2024-07-12
Payer: COMMERCIAL

## 2024-07-12 DIAGNOSIS — I25.10 CORONARY ARTERY CALCIFICATION SEEN ON CT SCAN: ICD-10-CM

## 2024-08-08 ENCOUNTER — APPOINTMENT (OUTPATIENT)
Dept: PRIMARY CARE | Facility: CLINIC | Age: 59
End: 2024-08-08
Payer: COMMERCIAL

## 2024-08-08 VITALS
SYSTOLIC BLOOD PRESSURE: 110 MMHG | WEIGHT: 303 LBS | HEART RATE: 65 BPM | BODY MASS INDEX: 44.75 KG/M2 | TEMPERATURE: 97.2 F | DIASTOLIC BLOOD PRESSURE: 78 MMHG | OXYGEN SATURATION: 98 %

## 2024-08-08 DIAGNOSIS — E11.49 OTHER DIABETIC NEUROLOGICAL COMPLICATION ASSOCIATED WITH TYPE 2 DIABETES MELLITUS (MULTI): ICD-10-CM

## 2024-08-08 DIAGNOSIS — F33.0 MILD EPISODE OF RECURRENT MAJOR DEPRESSIVE DISORDER (CMS-HCC): ICD-10-CM

## 2024-08-08 DIAGNOSIS — E78.5 HYPERLIPIDEMIA, UNSPECIFIED HYPERLIPIDEMIA TYPE: Primary | ICD-10-CM

## 2024-08-08 PROCEDURE — 3074F SYST BP LT 130 MM HG: CPT | Performed by: FAMILY MEDICINE

## 2024-08-08 PROCEDURE — 3078F DIAST BP <80 MM HG: CPT | Performed by: FAMILY MEDICINE

## 2024-08-08 PROCEDURE — 99214 OFFICE O/P EST MOD 30 MIN: CPT | Performed by: FAMILY MEDICINE

## 2024-08-08 PROCEDURE — 3046F HEMOGLOBIN A1C LEVEL >9.0%: CPT | Performed by: FAMILY MEDICINE

## 2024-08-08 RX ORDER — GABAPENTIN 100 MG/1
100 CAPSULE ORAL 2 TIMES DAILY
Qty: 180 CAPSULE | Refills: 0 | Status: SHIPPED | OUTPATIENT
Start: 2024-08-08 | End: 2024-08-08

## 2024-08-08 RX ORDER — GABAPENTIN 100 MG/1
100 CAPSULE ORAL 2 TIMES DAILY
Qty: 180 CAPSULE | Refills: 0 | Status: SHIPPED | OUTPATIENT
Start: 2024-08-08 | End: 2024-11-06

## 2024-08-08 RX ORDER — BUPROPION HYDROCHLORIDE 150 MG/1
150 TABLET, EXTENDED RELEASE ORAL 2 TIMES DAILY
Qty: 180 TABLET | Refills: 0 | Status: SHIPPED | OUTPATIENT
Start: 2024-08-08 | End: 2024-11-06

## 2024-08-08 ASSESSMENT — ENCOUNTER SYMPTOMS
SLEEP DISTURBANCE: 0
SHORTNESS OF BREATH: 0
BLOOD IN STOOL: 0
PALPITATIONS: 0
NAUSEA: 0
HEADACHES: 0
DYSPHORIC MOOD: 0
DIARRHEA: 0
ABDOMINAL PAIN: 0
CONSTIPATION: 0
DYSURIA: 0
DIZZINESS: 0
VOMITING: 0
FATIGUE: 0
DIFFICULTY URINATING: 0
MYALGIAS: 0

## 2024-08-08 NOTE — PROGRESS NOTES
Subjective   Patient ID: Morro Seo is a 59 y.o. male who presents for Diabetes and Diabetic Neuropathy (recheck) and multiple issues.     Diabetes  Pertinent negatives for hypoglycemia include no dizziness or headaches. Pertinent negatives for diabetes include no chest pain and no fatigue.      Mood: improving but just recently. Tolerating med without side effects. Wants to continue    Pain/neuropathy:  sl improvement w/ gabapentin. Makes him tired but tolerating BID dosing now    Tobacco: cont to smoke 1ppd. Wellbutrin did not help. Does not want to quit now    Has not followed up with cardiology and GI.     Review of Systems   Constitutional:  Negative for fatigue.   Eyes:  Negative for visual disturbance.   Respiratory:  Negative for shortness of breath.    Cardiovascular:  Negative for chest pain and palpitations.   Gastrointestinal:  Negative for abdominal pain, blood in stool, constipation, diarrhea, nausea and vomiting.   Genitourinary:  Negative for difficulty urinating and dysuria.   Musculoskeletal:  Negative for myalgias.   Skin:  Negative for rash.   Neurological:  Negative for dizziness and headaches.   Psychiatric/Behavioral:  Negative for dysphoric mood and sleep disturbance.        Objective   /78   Pulse 65   Temp 36.2 °C (97.2 °F)   Wt 137 kg (303 lb)   SpO2 98%   BMI 44.75 kg/m²     Physical Exam  Vitals and nursing note reviewed.   Constitutional:       General: He is not in acute distress.     Appearance: Normal appearance. He is not toxic-appearing.   HENT:      Head: Normocephalic.   Eyes:      Pupils: Pupils are equal, round, and reactive to light.   Cardiovascular:      Rate and Rhythm: Normal rate and regular rhythm.      Heart sounds: No murmur heard.  Pulmonary:      Effort: Pulmonary effort is normal. No respiratory distress.      Breath sounds: Normal breath sounds.   Musculoskeletal:         General: No tenderness.      Right lower leg: No edema.      Left lower leg: No  edema.   Skin:     General: Skin is warm.   Neurological:      General: No focal deficit present.      Mental Status: He is alert.      Cranial Nerves: No cranial nerve deficit.   Psychiatric:         Mood and Affect: Mood normal.         Assessment/Plan   Problem List Items Addressed This Visit             ICD-10-CM    Hyperlipidemia - Primary E78.5    Relevant Orders    Lipid Panel    Depression F32.A    Relevant Medications    buPROPion SR (Wellbutrin SR) 150 mg 12 hr tablet    Other Relevant Orders    Comprehensive Metabolic Panel     Other Visit Diagnoses         Codes    Other diabetic neurological complication associated with type 2 diabetes mellitus (Multi)     E11.49    Relevant Medications    gabapentin (Neurontin) 100 mg capsule    Other Relevant Orders    Hemoglobin A1C          Recommendations given. Consider cymbalta if needed for mood and pain next apt. Pt declines change today

## 2024-08-08 NOTE — PATIENT INSTRUCTIONS
Continue your current meds    Return in 3 months, sooner if needed    Follow up with your specialists as scheduled. Important to reschedule stress test and see GI for the history of possible stomach bleed    Obtain blood work before next appointment

## 2024-08-12 ENCOUNTER — HOSPITAL ENCOUNTER (OUTPATIENT)
Dept: RADIOLOGY | Facility: HOSPITAL | Age: 59
Discharge: HOME | End: 2024-08-12
Payer: COMMERCIAL

## 2024-08-12 ENCOUNTER — HOSPITAL ENCOUNTER (OUTPATIENT)
Dept: CARDIOLOGY | Facility: HOSPITAL | Age: 59
Discharge: HOME | End: 2024-08-12
Payer: COMMERCIAL

## 2024-08-12 DIAGNOSIS — I25.10 CORONARY ARTERY CALCIFICATION SEEN ON CT SCAN: ICD-10-CM

## 2024-08-12 PROCEDURE — 78452 HT MUSCLE IMAGE SPECT MULT: CPT | Performed by: STUDENT IN AN ORGANIZED HEALTH CARE EDUCATION/TRAINING PROGRAM

## 2024-08-12 PROCEDURE — 78451 HT MUSCLE IMAGE SPECT SING: CPT

## 2024-08-12 PROCEDURE — A9502 TC99M TETROFOSMIN: HCPCS | Performed by: STUDENT IN AN ORGANIZED HEALTH CARE EDUCATION/TRAINING PROGRAM

## 2024-08-12 PROCEDURE — 93017 CV STRESS TEST TRACING ONLY: CPT

## 2024-08-12 PROCEDURE — 3430000001 HC RX 343 DIAGNOSTIC RADIOPHARMACEUTICALS: Performed by: STUDENT IN AN ORGANIZED HEALTH CARE EDUCATION/TRAINING PROGRAM

## 2024-08-20 ENCOUNTER — TELEPHONE (OUTPATIENT)
Dept: GASTROENTEROLOGY | Facility: CLINIC | Age: 59
End: 2024-08-20

## 2024-08-20 ENCOUNTER — APPOINTMENT (OUTPATIENT)
Dept: GASTROENTEROLOGY | Facility: CLINIC | Age: 59
End: 2024-08-20
Payer: COMMERCIAL

## 2024-08-20 VITALS
HEIGHT: 69 IN | WEIGHT: 296 LBS | BODY MASS INDEX: 43.84 KG/M2 | HEART RATE: 76 BPM | DIASTOLIC BLOOD PRESSURE: 70 MMHG | OXYGEN SATURATION: 96 % | SYSTOLIC BLOOD PRESSURE: 114 MMHG

## 2024-08-20 DIAGNOSIS — K92.0 HEMATEMESIS, UNSPECIFIED WHETHER NAUSEA PRESENT: Primary | ICD-10-CM

## 2024-08-20 DIAGNOSIS — Z86.010 HISTORY OF COLON POLYPS: ICD-10-CM

## 2024-08-20 DIAGNOSIS — K21.01 GASTROESOPHAGEAL REFLUX DISEASE WITH ESOPHAGITIS AND HEMORRHAGE: ICD-10-CM

## 2024-08-20 PROCEDURE — 3046F HEMOGLOBIN A1C LEVEL >9.0%: CPT | Performed by: NURSE PRACTITIONER

## 2024-08-20 PROCEDURE — 3078F DIAST BP <80 MM HG: CPT | Performed by: NURSE PRACTITIONER

## 2024-08-20 PROCEDURE — 3074F SYST BP LT 130 MM HG: CPT | Performed by: NURSE PRACTITIONER

## 2024-08-20 PROCEDURE — 99204 OFFICE O/P NEW MOD 45 MIN: CPT | Performed by: NURSE PRACTITIONER

## 2024-08-20 PROCEDURE — 3008F BODY MASS INDEX DOCD: CPT | Performed by: NURSE PRACTITIONER

## 2024-08-20 ASSESSMENT — ENCOUNTER SYMPTOMS
TROUBLE SWALLOWING: 0
BRUISES/BLEEDS EASILY: 0
SHORTNESS OF BREATH: 0
DIZZINESS: 0
FEVER: 0
ADENOPATHY: 0
ROS GI COMMENTS: SEE HPI
CONFUSION: 0
ARTHRALGIAS: 0
CHILLS: 0
SORE THROAT: 0
COUGH: 0
JOINT SWELLING: 0
WEAKNESS: 0
PALPITATIONS: 0
WOUND: 0
DIFFICULTY URINATING: 0

## 2024-08-20 NOTE — PATIENT INSTRUCTIONS
Thank you for coming to your appointment today   - You will be scheduled for an EGD and colonoscopy in the surgery center   - Please follow the bowel prep instructions given to you by the office.   - After your procedure, you can expect to speak to the physician to go over the initial results of the procedure.   - If any polyps are removed during your procedure or if any biopsies are obtained those specimens will go to the pathologists to review under the microscope. Once those results are available they will be sent to the physician electronically to review. These results will also be available to you at that time through the patient portal. These results will be reviewed by the physician and communicated back to you with final recommendations. If you have questions or need additional information I urge you to call the office at [phone], but we do ask for patience as the we are often with patients.   - You were also given information regarding the schedule for your procedure including the time that you need to arrive to the endoscopy unit.  You will also be contacted about 1 week prior to your procedure to confirm the final arrival time.  If you have questions about this or if you need to cancel or change this appointment please call my office at [phone].      Please call [phone] with any questions or concerns

## 2024-08-20 NOTE — TELEPHONE ENCOUNTER
Waiting for Cardiac Appointment 8.22.24    EGD/Colonoscopy  OR  Miralax Prep given    Holding Aspirin 325mg 7 days

## 2024-08-20 NOTE — PROGRESS NOTES
Subjective   Patient ID: Morro Seo is a 59 y.o. male with PMH of HTN, HLD, CHF, pacemaker, heart block, DM2, NAVYA on CPAP, COPD, OA, DVT, Erythrocytosis, anxiety, and depression who was referred by Lisseth Thomas DO for New Patient Visit (Spit up blood once a couple of months ago ).     Patient's PCP is Lisseth Thomas DO     HPI  Patient went to PCPs office on 2/19/2024 for nausea with an episode of hematemesis. They sent patient to the ER for this; CBC showed no anemia at that time and he was discharged home. His Celebrex was stopped which he takes for joint pain. He was referred to GI but did not initially make an appointment, so he is presenting now for this remote history of hematemesis and GERD.     He states he has not had another episode of hematemesis. He takes omeprazole 40mg daily for GERD and reports this controls his symptoms well for the most part. He denies N/V, abdominal pain, or melena. His celebrex has not been restarted and states he is in pain every day because of this.     He had a colonoscopy in 2016 and had a mix of tubular adenomas and hyperplastic polyps. No prior EGD.     He otherwise denies lower abdominal pain, change in bowel habits, or rectal bleeding.       Summary of endoscopies:  - Colonoscopy 5/2016: 7 small polyps; mix of TA and HP.     Social Hx:  Tobacco: 1ppd   Etoh: none   Recreational drug use: none   NSAIDs: aspirin 325 daily       Family Hx:  No GI malignancy, IBD, or pancreatitis     Review of Systems:  Review of Systems   Constitutional:  Negative for chills and fever.   HENT:  Negative for sore throat and trouble swallowing.    Respiratory:  Negative for cough and shortness of breath.    Cardiovascular:  Negative for chest pain and palpitations.   Gastrointestinal:         SEE HPI   Endocrine: Negative for cold intolerance and heat intolerance.   Genitourinary:  Negative for difficulty urinating.   Musculoskeletal:  Negative for arthralgias and joint swelling.    Skin:  Negative for rash and wound.   Neurological:  Negative for dizziness and weakness.   Hematological:  Negative for adenopathy. Does not bruise/bleed easily.   Psychiatric/Behavioral:  Negative for confusion.         Medications:  Prior to Admission medications    Medication Sig Start Date End Date Taking? Authorizing Provider   acarbose (Precose) 50 mg tablet Take 1 tablet (50 mg) by mouth 3 times daily (morning, midday, late afternoon). 7/8/24 10/6/24  Lisseth Thomas DO   ammonium lactate (Lac-Hydrin) 12 % lotion USE 1 APPLICATION TO THE AFFECTED AREA EVERY NIGHT 4/17/23   Historical Provider, MD   aspirin 325 mg tablet Take 1 tablet (325 mg) by mouth once daily.    Historical Provider, MD   atorvastatin (Lipitor) 80 mg tablet Take 1 tablet (80 mg) by mouth once daily. 7/8/24   Lisseth Thomas DO   budesonide-formoteroL (Symbicort) 160-4.5 mcg/actuation inhaler Inhale 2 puffs 2 times a day. Rinse mouth with water after use to reduce aftertaste and incidence of candidiasis. Do not swallow. 3/22/24   Terri Lucas, APRN-CNP   buPROPion SR (Wellbutrin SR) 150 mg 12 hr tablet Take 1 tablet (150 mg) by mouth 2 times a day. Do not crush, chew, or split. 8/8/24 11/6/24  Lisseth Thomas DO   gabapentin (Neurontin) 100 mg capsule Take 1 capsule (100 mg) by mouth 2 times a day. 8/8/24 11/6/24  Lisseth Thomas DO   ipratropium-albuteroL (Duo-Neb) 0.5-2.5 mg/3 mL nebulizer solution Take 3 mL by nebulization every 4 hours if needed for wheezing. 8/7/23   Historical Provider, MD   metFORMIN (Glucophage) 1,000 mg tablet Take 1 tablet (1,000 mg) by mouth 2 times a day with meals. 2/19/24   Lisseth Thomas DO   omeprazole (PriLOSEC) 40 mg DR capsule Take 1 capsule (40 mg) by mouth once daily in the morning. Take before meals. Do not crush or chew. 2/29/24 2/28/25  Lisseth Thomas DO   potassium chloride CR 10 mEq ER tablet TAKE 1 TABLET BY MOUTH ONCE DAILY. 6/3/24   Bre Sosa, APRN-CNP   SITagliptin phosphate  (Januvia) 100 mg tablet Take 1 tablet (100 mg) by mouth once daily. 7/8/24 10/6/24  Lisseth Thomas,    spironolactone (Aldactone) 25 mg tablet Take 1 tablet (25 mg) by mouth once daily. 4/26/24 4/26/25  REJI Booth   tiotropium (Spiriva Respimat) 2.5 mcg/actuation inhaler Inhale 2 puffs once daily. 3/22/24   REJI Lugo   torsemide (Demadex) 20 mg tablet Take 1 tablet (20 mg) by mouth once daily. as directed 4/26/24 4/26/25  REJI Booth       Allergies:  Jardiance [empagliflozin], Penicillins, and Sulfamethoxazole-trimethoprim    Past Medical History:  He has a past medical history of Complete heart block (Multi) (10/31/2023), Diverticulosis of intestine, part unspecified, without perforation or abscess without bleeding (01/30/2020), Personal history of other endocrine, nutritional and metabolic disease (01/30/2020), Personal history of other endocrine, nutritional and metabolic disease (01/30/2020), Personal history of other specified conditions (05/04/2018), Personal history of pneumonia (recurrent) (05/08/2018), Solitary pulmonary nodule (05/04/2018), and Unspecified open wound of unspecified toe(s) without damage to nail, sequela (08/11/2020).    Past Surgical History:  He has a past surgical history that includes Other surgical history (04/28/2020); Other surgical history (04/28/2020); Other surgical history (04/28/2020); Other surgical history (04/28/2020); and Other surgical history (07/23/2020).    Social History:  He reports that he has been smoking cigarettes. He has never used smokeless tobacco. He reports that he does not currently use alcohol. He reports that he does not use drugs.    Objective   Physical exam:  Physical Exam  Constitutional:       General: He is not in acute distress.     Appearance: Normal appearance.   HENT:      Mouth/Throat:      Mouth: Mucous membranes are moist.      Comments: pink  Eyes:      Conjunctiva/sclera: Conjunctivae normal.       Pupils: Pupils are equal, round, and reactive to light.   Cardiovascular:      Rate and Rhythm: Normal rate and regular rhythm.      Heart sounds: No murmur heard.  Pulmonary:      Effort: Pulmonary effort is normal.      Breath sounds: Normal breath sounds.   Abdominal:      General: Bowel sounds are normal. There is no distension.      Palpations: Abdomen is soft.      Tenderness: There is no abdominal tenderness. There is no guarding.   Skin:     General: Skin is warm and dry.      Coloration: Skin is not jaundiced.   Neurological:      Mental Status: He is alert and oriented to person, place, and time.   Psychiatric:         Mood and Affect: Mood normal.         Behavior: Behavior normal.          Assessment/Plan     Remote history of hematemesis, GERD  Discussed EGD and risks/benefits of doing one. Likely will be low yield as patient has not recently had hematemesis, but he does have chronic GERD and has been on PPI long term. Will schedule in OR due to comorbidities and borderline BMI.     History of colon polyps   Due for surveillance. Will schedule in OR.       Meds to hold: ASA 325mg x7 days     Of note, patient has cardiology appt in 2 days to review prior cardiac testing; will wait to schedule until after patient sees cardiology.        Tashia Pina, APRN-CNP

## 2024-08-22 ENCOUNTER — OFFICE VISIT (OUTPATIENT)
Dept: CARDIOLOGY | Facility: HOSPITAL | Age: 59
End: 2024-08-22
Payer: COMMERCIAL

## 2024-08-22 ENCOUNTER — DOCUMENTATION (OUTPATIENT)
Dept: PRIMARY CARE | Facility: CLINIC | Age: 59
End: 2024-08-22
Payer: COMMERCIAL

## 2024-08-22 VITALS
BODY MASS INDEX: 43.99 KG/M2 | HEART RATE: 83 BPM | HEIGHT: 69 IN | SYSTOLIC BLOOD PRESSURE: 114 MMHG | WEIGHT: 297 LBS | DIASTOLIC BLOOD PRESSURE: 60 MMHG

## 2024-08-22 DIAGNOSIS — I10 ESSENTIAL HYPERTENSION: ICD-10-CM

## 2024-08-22 DIAGNOSIS — E78.5 HYPERLIPIDEMIA, UNSPECIFIED HYPERLIPIDEMIA TYPE: ICD-10-CM

## 2024-08-22 DIAGNOSIS — R93.1 MYOCARDIAL PERFUSION DEFECT: ICD-10-CM

## 2024-08-22 DIAGNOSIS — R93.1 AGATSTON CORONARY ARTERY CALCIUM SCORE GREATER THAN 400: ICD-10-CM

## 2024-08-22 DIAGNOSIS — I51.7 LEFT VENTRICULAR HYPERTROPHY: ICD-10-CM

## 2024-08-22 DIAGNOSIS — F17.200 MODERATE TOBACCO USE DISORDER: ICD-10-CM

## 2024-08-22 DIAGNOSIS — I50.32 CHRONIC DIASTOLIC (CONGESTIVE) HEART FAILURE (MULTI): Primary | ICD-10-CM

## 2024-08-22 DIAGNOSIS — Z95.0 STATUS CARDIAC PACEMAKER: ICD-10-CM

## 2024-08-22 LAB — HEMOGLOBIN A1C/HEMOGLOBIN TOTAL IN BLOOD EXTERNAL: 9 %

## 2024-08-22 PROCEDURE — 93005 ELECTROCARDIOGRAM TRACING: CPT | Performed by: INTERNAL MEDICINE

## 2024-08-22 PROCEDURE — 99215 OFFICE O/P EST HI 40 MIN: CPT | Performed by: INTERNAL MEDICINE

## 2024-08-22 PROCEDURE — 3052F HG A1C>EQUAL 8.0%<EQUAL 9.0%: CPT | Performed by: INTERNAL MEDICINE

## 2024-08-22 PROCEDURE — 93010 ELECTROCARDIOGRAM REPORT: CPT | Performed by: INTERNAL MEDICINE

## 2024-08-22 PROCEDURE — 3008F BODY MASS INDEX DOCD: CPT | Performed by: INTERNAL MEDICINE

## 2024-08-22 PROCEDURE — 3074F SYST BP LT 130 MM HG: CPT | Performed by: INTERNAL MEDICINE

## 2024-08-22 PROCEDURE — 3078F DIAST BP <80 MM HG: CPT | Performed by: INTERNAL MEDICINE

## 2024-08-22 RX ORDER — ATROPINE SULFATE 0.1 MG/ML
.25-5 INJECTION INTRAVENOUS ONCE AS NEEDED
OUTPATIENT
Start: 2024-08-22

## 2024-08-22 RX ORDER — DOBUTAMINE HYDROCHLORIDE 100 MG/100ML
5-40 INJECTION INTRAVENOUS CONTINUOUS
OUTPATIENT
Start: 2024-08-22

## 2024-08-22 RX ORDER — DAPAGLIFLOZIN 10 MG/1
10 TABLET, FILM COATED ORAL DAILY
Qty: 30 TABLET | Refills: 11 | Status: SHIPPED | OUTPATIENT
Start: 2024-08-22 | End: 2025-08-22

## 2024-08-22 ASSESSMENT — ENCOUNTER SYMPTOMS
LOSS OF SENSATION IN FEET: 0
DEPRESSION: 1
OCCASIONAL FEELINGS OF UNSTEADINESS: 0

## 2024-08-22 NOTE — PROGRESS NOTES
"Chief Complaint:   Follow-up (STRESS TEST RESULTS)     History Of Present Illness:    Morro Seo is a 59 y.o. male presenting for follow-up.    He has a history of type 2 diabetes mellitus, sleep apnea, smoking and obesity, complete heart block status post permanent pacer in 2020.  Lyme studies were negative.  Echo showed structurally normal heart, but Moderate concentric LVH. He had a pharmacological stress MPI in 2020 showing normal perfusion and normal LV function.  Diagnosed with polycythemia and now seeing a hematologist, periodic phlebotomy being done..   More recently has been diagnosed of chronic diastolic heart failure and started on diuretics with symptom improvement.  Also has sleep apnea on CPAP, obesity and PCP ordered CT calcium score recently shows elevated calcium score of 1300 or more.  This was followed by a stress test ordered by our [facility] that he was unable to complete.    Does not have chest pain, shortness of breath, palpitations, lightheadedness, ankle swelling. EKG showed atrial sensed with ventricular paced rhythm. Reviewed pacemaker interrogation report from September 2023 normally functioning pacer.     Unable to quit smoking with Chantix, nicotine patches and Wellbutrin-keeps trying however with intermittent relapse.. He states he has tried everything. He tells me his sleep apnea is well controlled on CPAP.      He was experiencing a lot of ankle swelling and we added some torsemide which helped.  He was having leg cramps that improved with potassium supplements.  He feels quitting smoking also helps his ankle swelling.     His echo from earlier in 2023 shows normal LV function but dilated RV and ongoing moderate concentric LVH.   .     Last Recorded Vitals:  Vitals:    08/22/24 1539   BP: 114/60   Pulse: 83   Weight: 135 kg (297 lb)   Height: 1.753 m (5' 9\")       Past Medical History:  He has a past medical history of Complete heart block (Multi) (10/31/2023), Diverticulosis of " intestine, part unspecified, without perforation or abscess without bleeding (01/30/2020), Personal history of other endocrine, nutritional and metabolic disease (01/30/2020), Personal history of other endocrine, nutritional and metabolic disease (01/30/2020), Personal history of other specified conditions (05/04/2018), Personal history of pneumonia (recurrent) (05/08/2018), Solitary pulmonary nodule (05/04/2018), and Unspecified open wound of unspecified toe(s) without damage to nail, sequela (08/11/2020).    Past Surgical History:  He has a past surgical history that includes Other surgical history (04/28/2020); Other surgical history (04/28/2020); Other surgical history (04/28/2020); Other surgical history (04/28/2020); and Other surgical history (07/23/2020).      Social History:  He reports that he has been smoking cigarettes. He has never used smokeless tobacco. He reports that he does not currently use alcohol. He reports that he does not use drugs.    Family History:  Family History   Problem Relation Name Age of Onset    Heart attack Mother      Hypertension Mother      Coronary artery disease Father      Heart attack Father      Hypertension Father      Diabetes Sister      Diabetes Brother      Lung cancer Maternal Grandfather      Lung cancer Paternal Grandfather          Allergies:  Jardiance [empagliflozin], Penicillins, and Sulfamethoxazole-trimethoprim    Outpatient Medications:  Current Outpatient Medications   Medication Instructions    acarbose (PRECOSE) 50 mg, oral, 3 times daily (morning, midday, late afternoon)    ammonium lactate (Lac-Hydrin) 12 % lotion USE 1 APPLICATION TO THE AFFECTED AREA EVERY NIGHT    aspirin 325 mg, oral, Daily    atorvastatin (LIPITOR) 80 mg, oral, Daily    budesonide-formoteroL (Symbicort) 160-4.5 mcg/actuation inhaler 2 puffs, inhalation, 2 times daily RT, Rinse mouth with water after use to reduce aftertaste and incidence of candidiasis. Do not swallow.    buPROPion  SR (WELLBUTRIN SR) 150 mg, oral, 2 times daily, Do not crush, chew, or split.    gabapentin (NEURONTIN) 100 mg, oral, 2 times daily    ipratropium-albuteroL (Duo-Neb) 0.5-2.5 mg/3 mL nebulizer solution 3 mL, nebulization, Every 4 hours PRN    metFORMIN (GLUCOPHAGE) 1,000 mg, oral, 2 times daily (morning and late afternoon)    omeprazole (PRILOSEC) 40 mg, oral, Daily before breakfast, Do not crush or chew.    potassium chloride CR 10 mEq ER tablet 10 mEq, oral, Daily    SITagliptin phosphate (JANUVIA) 100 mg, oral, Daily    spironolactone (ALDACTONE) 25 mg, oral, Daily    tiotropium (Spiriva Respimat) 2.5 mcg/actuation inhaler 2 puffs, inhalation, Daily    torsemide (DEMADEX) 20 mg, oral, Daily, as directed       Physical Exam:  Physical Exam  Vitals reviewed.   Constitutional:       Appearance: Normal appearance.   Neck:      Vascular: No carotid bruit or JVD.   Cardiovascular:      Rate and Rhythm: Normal rate and regular rhythm.      Heart sounds: Normal heart sounds, S1 normal and S2 normal. No murmur heard.  Pulmonary:      Effort: Pulmonary effort is normal.      Breath sounds: Normal breath sounds.   Abdominal:      General: Abdomen is flat. Bowel sounds are normal.      Palpations: Abdomen is soft.   Musculoskeletal:      Right lower le+ Pitting Edema present.      Left lower le+ Pitting Edema present.   Skin:     General: Skin is warm.   Neurological:      Mental Status: He is alert. Mental status is at baseline.   Psychiatric:         Mood and Affect: Mood normal.         Behavior: Behavior normal.           Last Labs:  CBC -  Lab Results   Component Value Date    WBC 9.2 2024    HGB 17.0 2024    HCT 50.2 2024    MCV 89 2024     2024       CMP -  Lab Results   Component Value Date    CALCIUM 9.7 2024    PROT 6.6 2024    ALBUMIN 4.4 2024    AST 17 2024    ALT 23 2024    ALKPHOS 106 2024    BILITOT 1.5 (H) 2024       LIPID  "PANEL -   Lab Results   Component Value Date    CHOL 76 11/15/2023    TRIG 148 11/15/2023    HDL 30.5 11/15/2023    CHHDL 2.5 11/15/2023    LDLF 30 03/28/2022    VLDL 30 11/15/2023    NHDL 46 11/15/2023       RENAL FUNCTION PANEL -   Lab Results   Component Value Date    GLUCOSE 274 (H) 07/03/2024     (L) 07/03/2024    K 4.4 07/03/2024    CL 96 (L) 07/03/2024    CO2 31 07/03/2024    ANIONGAP 12 07/03/2024    BUN 19 07/03/2024    CREATININE 0.85 07/03/2024    GFRMALE >90 09/18/2023    CALCIUM 9.7 07/03/2024    ALBUMIN 4.4 07/03/2024        Lab Results   Component Value Date    BNP 11 08/04/2023    HGBA1C 10.5 (H) 07/03/2024       Last Cardiology Tests:  ECG:  ECG 12 lead (Clinic Performed) 11/02/2023      Echo:  No results found for this or any previous visit from the past 1095 days.      Ejection Fractions:  No results found for: \"EF\"    Cath:  No results found for this or any previous visit from the past 1095 days.      Stress Test:  No results found for this or any previous visit from the past 1095 days.      Cardiac Imaging:  No results found for this or any previous visit from the past 1095 days.          Assessment/Plan   1-He has mild to moderate concentric LVH which is ongoing despite treatment of heart failure sleep apnea and blood pressure. May have infiltrative cardiomyopathy although obesity could be contributing as well.  Normal ferritin levels. Prior to visit next year we will obtain an echo with strain imaging.  Will plan on getting PYP scan and amyloid studies at that point.    2-elevated CT calcium score-with congestive heart failure will order different type of stress test, dobutamine.    3-hypertension-blood pressure currently well-controlled continue current regimen.    4-tobacco use-motivated to quit continue to discuss in future visits.    5-chronic diastolic heart failure-well compensated continue current management.  Add SGLT2 inhibitors.  Patient was allergic to Jardiance with rash.  We " discussed the risk of that happening with Farxiga but overall benefits may outweigh risk.  Patient is willing to try this.  Continue torsemide and Aldactone.    6-presence of permanent pacer-follow through pacer clinic.    Orquidea Núñez MD

## 2024-08-26 ENCOUNTER — APPOINTMENT (OUTPATIENT)
Dept: PHARMACY | Facility: HOSPITAL | Age: 59
End: 2024-08-26
Payer: COMMERCIAL

## 2024-08-26 ENCOUNTER — TELEPHONE (OUTPATIENT)
Dept: GASTROENTEROLOGY | Facility: CLINIC | Age: 59
End: 2024-08-26

## 2024-08-26 DIAGNOSIS — E11.69 TYPE 2 DIABETES MELLITUS WITH OTHER SPECIFIED COMPLICATION, WITHOUT LONG-TERM CURRENT USE OF INSULIN (MULTI): ICD-10-CM

## 2024-08-26 DIAGNOSIS — E11.49 OTHER DIABETIC NEUROLOGICAL COMPLICATION ASSOCIATED WITH TYPE 2 DIABETES MELLITUS (MULTI): ICD-10-CM

## 2024-08-26 RX ORDER — BLOOD-GLUCOSE SENSOR
EACH MISCELLANEOUS
Qty: 6 EACH | Refills: 11 | Status: SHIPPED | OUTPATIENT
Start: 2024-08-26

## 2024-08-26 NOTE — ASSESSMENT & PLAN NOTE
Patient's diabetes is currenty uncontrolled, as shown by A1c of 9% (goal < 7%).     Patient expresses interest in starting CGM for glucose monitoring. This should be covered by insurance completely. Also gave patient instructions on connecting to [employer].     Patient recently prescribed Farxiga at Cardiology visit. Patient has not started yet. Did have a rash with Jardiance. We will monitor closely for AE. Patient to call for any concerns.     Patient is due for UACR with next lab draw. Will add this to orders.     Plan:  START Farxiga 10 mg daily  To  today  START FreeStyle Aliza 3  [facility]  CONTINUE acarbose, metformin and Januvia

## 2024-08-26 NOTE — PROGRESS NOTES
Clinical Pharmacy Appointment    Patient ID: Morro Seo is a 59 y.o. male who presents for Med Management and Diabetes.    Pt is here for First appointment.     Referring Provider: Lisseth Thomas DO  PCP: Lisseth Thomas DO   Last visit with PCP: 8/8/2024   Next visit with PCP: 11/12/2024    Cardiology: Dr. Núñez     Subjective     Interval History  N/A    HPI  Type II Diabetes  Current  Pharmacotherapy:   Acarbose 50 mg TID  Metformin 1000 mg BID  Januvia 100 mg daily     SECONDARY PREVENTION  - Statin? Atorvastatin 80 mg   LDL: 16   TC: 76  - ACE-I/ARB? No   UACR: 55.6 (4/26/2021)   BP: 114/60  - Aspirin? Yes    -The ASCVD Risk score (Belem GARCIA, et al., 2019) failed to calculate for the following reasons:    The patient has a prior MI or stroke diagnosis      Current monitoring regimen:   Patient is using: not testing at home     SMBG Fasting Readings: n/a    Pertinent PMH Review:  - PMH of Pancreatitis: n/a  - PMH/FH of Medullary Thyroid Cancer: n/a  - PMH of Retinopathy: n/a       Drug Interactions  No relevant drug interactions were noted.        Objective   Allergies   Allergen Reactions    Jardiance [Empagliflozin] Rash    Penicillins Unknown     Unknown reaction as child    Sulfamethoxazole-Trimethoprim Rash     Social History     Social History Narrative    Not on file      Medication Review  Current Outpatient Medications   Medication Instructions    acarbose (PRECOSE) 50 mg, oral, 3 times daily (morning, midday, late afternoon)    ammonium lactate (Lac-Hydrin) 12 % lotion USE 1 APPLICATION TO THE AFFECTED AREA EVERY NIGHT    aspirin 325 mg, oral, Daily    atorvastatin (LIPITOR) 80 mg, oral, Daily    budesonide-formoteroL (Symbicort) 160-4.5 mcg/actuation inhaler 2 puffs, inhalation, 2 times daily RT, Rinse mouth with water after use to reduce aftertaste and incidence of candidiasis. Do not swallow.    buPROPion SR (WELLBUTRIN SR) 150 mg, oral, 2 times daily, Do not crush, chew, or split.     dapagliflozin propanediol (FARXIGA) 10 mg, oral, Daily    FreeStyle Aliza 3 Sensor device Use for continuous glucose monitoring. Change every 14 days.    gabapentin (NEURONTIN) 100 mg, oral, 2 times daily    ipratropium-albuteroL (Duo-Neb) 0.5-2.5 mg/3 mL nebulizer solution 3 mL, nebulization, Every 4 hours PRN    metFORMIN (GLUCOPHAGE) 1,000 mg, oral, 2 times daily (morning and late afternoon)    omeprazole (PRILOSEC) 40 mg, oral, Daily before breakfast, Do not crush or chew.    potassium chloride CR 10 mEq ER tablet 10 mEq, oral, Daily    SITagliptin phosphate (JANUVIA) 100 mg, oral, Daily    spironolactone (ALDACTONE) 25 mg, oral, Daily    tiotropium (Spiriva Respimat) 2.5 mcg/actuation inhaler 2 puffs, inhalation, Daily    torsemide (DEMADEX) 20 mg, oral, Daily, as directed      Vitals  BP Readings from Last 2 Encounters:   08/22/24 114/60   08/20/24 114/70     BMI Readings from Last 1 Encounters:   08/22/24 43.86 kg/m²      Labs  A1C  Lab Results   Component Value Date    HGBA1C 9.0 08/21/2024    HGBA1C 10.5 (H) 07/03/2024    HGBA1C 8.6 (H) 02/19/2024     BMP  Lab Results   Component Value Date    CALCIUM 9.7 07/03/2024     (L) 07/03/2024    K 4.4 07/03/2024    CO2 31 07/03/2024    CL 96 (L) 07/03/2024    BUN 19 07/03/2024    CREATININE 0.85 07/03/2024    EGFR >90 07/03/2024     LFTs  Lab Results   Component Value Date    ALT 23 07/03/2024    AST 17 07/03/2024    ALKPHOS 106 07/03/2024    BILITOT 1.5 (H) 07/03/2024     FLP  Lab Results   Component Value Date    TRIG 148 11/15/2023    CHOL 76 11/15/2023    LDLF 30 03/28/2022    LDLCALC 16 11/15/2023    HDL 30.5 11/15/2023     Urine Microalbumin  Lab Results   Component Value Date    MICROALBCREA 37.2 (H) 04/26/2021     Weight Management  Wt Readings from Last 3 Encounters:   08/22/24 135 kg (297 lb)   08/20/24 134 kg (296 lb)   08/08/24 137 kg (303 lb)      There is no height or weight on file to calculate BMI.     Patient Discussion:  Dr. Thomas took him  off the celecoxib. Stopped for GI bleed  Pain is worsening significantly. Hard to accomplish ADLs.   Tylenol and Aleve with no relief.     Assessment/Plan   Problem List Items Addressed This Visit       Type 2 diabetes mellitus, without long-term current use of insulin (Multi)     Patient's diabetes is currenty uncontrolled, as shown by A1c of 9% (goal < 7%).     Patient expresses interest in starting CGM for glucose monitoring. This should be covered by insurance completely. Also gave patient instructions on connecting to [employer].     Patient recently prescribed Farxiga at Cardiology visit. Patient has not started yet. Did have a rash with Jardiance. We will monitor closely for AE. Patient to call for any concerns.     Patient is due for UACR with next lab draw. Will add this to orders.     Plan:  START Farxiga 10 mg daily  To  today  START [facility]Style Aliza 3  [facility]  CONTINUE acarbose, metformin and Januvia         Relevant Medications    FreeStyle Aliza 3 Sensor device    Other Relevant Orders    Albumin-Creatinine Ratio, Urine Random    Follow Up In Clinical Pharmacy     Other Visit Diagnoses       Other diabetic neurological complication associated with type 2 diabetes mellitus (Multi)              Clinical Pharmacist follow-up: 9/9 @12 PM, Telehealth visit    Continue all meds under the continuation of care with the referring provider and clinical pharmacy team.    Thank you,  Ricarda Wong, PharmD  Clinical Pharmacy Specialist     Verbal consent to manage patient's drug therapy was obtained from the patient. They were informed they may decline to participate or withdraw from participation in pharmacy services at any time.

## 2024-08-26 NOTE — TELEPHONE ENCOUNTER
----- Message from Nupur QUINTERO sent at 8/26/2024 11:32 AM EDT -----  Regarding: RE: cardiology work up  Scheduled for 9.27.24. Packet has been remailed 8.26.24  Patient also informed of med holds.  ----- Message -----  From: REJI Curtis  Sent: 8/26/2024  10:50 AM EDT  To: Nupur Rm MA  Subject: RE: cardiology work up                           OK to go ahead and schedule procedures in OR. It looks like cardiology added farxiga so will need to hold that for 3 days prior to procedures and previously discussed holding aspirin 325mg for 7 days prior to procedures.  ----- Message -----  From: REJI Curtis  Sent: 8/22/2024   8:00 AM EDT  To: REJI Curtis  Subject: cardiology work up                               Will need scopes. Sees cardiology 8/22/2024

## 2024-08-28 DIAGNOSIS — E11.69 TYPE 2 DIABETES MELLITUS WITH OTHER SPECIFIED COMPLICATION, WITHOUT LONG-TERM CURRENT USE OF INSULIN (MULTI): Primary | ICD-10-CM

## 2024-08-29 NOTE — PROGRESS NOTES
Clinical Pharmacy Appointment    Patient ID: Morro Seo is a 59 y.o. male who presents for Diabetes.    Pt is here for First appointment.     Referring Provider: Lisseth Thomas DO  PCP: Lisseth Thomas DO   Last visit with PCP: 8/8/2024   Next visit with PCP: 11/12/2024    Cardiology: Dr. Núñez     Subjective     Interval History  N/A    HPI  Type II Diabetes  Current  Pharmacotherapy:   Acarbose 50 mg TID  Metformin 1000 mg BID  Januvia 100 mg daily     SECONDARY PREVENTION  - Statin? Atorvastatin 80 mg   LDL: 16   TC: 76  - ACE-I/ARB? No   UACR: 55.6 (4/26/2021)   BP: 114/60  - Aspirin? Yes    -The ASCVD Risk score (Belem GARCIA, et al., 2019) failed to calculate for the following reasons:    The patient has a prior MI or stroke diagnosis      Current monitoring regimen:   Patient is using: not testing at home     SMBG Fasting Readings: n/a    Pertinent PMH Review:  - PMH of Pancreatitis: n/a  - PMH/FH of Medullary Thyroid Cancer: n/a  - PMH of Retinopathy: n/a       Drug Interactions  No relevant drug interactions were noted.        Objective   Allergies   Allergen Reactions    Jardiance [Empagliflozin] Rash    Penicillins Unknown     Unknown reaction as child    Sulfamethoxazole-Trimethoprim Rash     Social History     Social History Narrative    Not on file      Medication Review  Current Outpatient Medications   Medication Instructions    acarbose (PRECOSE) 50 mg, oral, 3 times daily (morning, midday, late afternoon)    ammonium lactate (Lac-Hydrin) 12 % lotion USE 1 APPLICATION TO THE AFFECTED AREA EVERY NIGHT    aspirin 325 mg, oral, Daily    atorvastatin (LIPITOR) 80 mg, oral, Daily    budesonide-formoteroL (Symbicort) 160-4.5 mcg/actuation inhaler 2 puffs, inhalation, 2 times daily RT, Rinse mouth with water after use to reduce aftertaste and incidence of candidiasis. Do not swallow.    buPROPion SR (WELLBUTRIN SR) 150 mg, oral, 2 times daily, Do not crush, chew, or split.    dapagliflozin  propanediol (FARXIGA) 10 mg, oral, Daily    FreeStyle Aliza 3 Sensor device Use for continuous glucose monitoring. Change every 14 days.    gabapentin (NEURONTIN) 100 mg, oral, 2 times daily    ipratropium-albuteroL (Duo-Neb) 0.5-2.5 mg/3 mL nebulizer solution 3 mL, nebulization, Every 4 hours PRN    metFORMIN (GLUCOPHAGE) 1,000 mg, oral, 2 times daily (morning and late afternoon)    omeprazole (PRILOSEC) 40 mg, oral, Daily before breakfast, Do not crush or chew.    potassium chloride CR 10 mEq ER tablet 10 mEq, oral, Daily    SITagliptin phosphate (JANUVIA) 100 mg, oral, Daily    spironolactone (ALDACTONE) 25 mg, oral, Daily    tiotropium (Spiriva Respimat) 2.5 mcg/actuation inhaler 2 puffs, inhalation, Daily    torsemide (DEMADEX) 20 mg, oral, Daily, as directed      Vitals  BP Readings from Last 2 Encounters:   08/22/24 114/60   08/20/24 114/70     BMI Readings from Last 1 Encounters:   08/22/24 43.86 kg/m²      Labs  A1C  Lab Results   Component Value Date    HGBA1C 9.0 08/21/2024    HGBA1C 10.5 (H) 07/03/2024    HGBA1C 8.6 (H) 02/19/2024     BMP  Lab Results   Component Value Date    CALCIUM 9.7 07/03/2024     (L) 07/03/2024    K 4.4 07/03/2024    CO2 31 07/03/2024    CL 96 (L) 07/03/2024    BUN 19 07/03/2024    CREATININE 0.85 07/03/2024    EGFR >90 07/03/2024     LFTs  Lab Results   Component Value Date    ALT 23 07/03/2024    AST 17 07/03/2024    ALKPHOS 106 07/03/2024    BILITOT 1.5 (H) 07/03/2024     FLP  Lab Results   Component Value Date    TRIG 148 11/15/2023    CHOL 76 11/15/2023    LDLF 30 03/28/2022    LDLCALC 16 11/15/2023    HDL 30.5 11/15/2023     Urine Microalbumin  Lab Results   Component Value Date    MICROALBCREA 37.2 (H) 04/26/2021     Weight Management  Wt Readings from Last 3 Encounters:   08/22/24 135 kg (297 lb)   08/20/24 134 kg (296 lb)   08/08/24 137 kg (303 lb)      There is no height or weight on file to calculate BMI.     Patient Discussion:  Dr. Thomas took him off the  celecoxib. Stopped for GI bleed  Pain is worsening significantly. Hard to accomplish ADLs.   Tylenol and Aleve with no relief.     Assessment/Plan   Problem List Items Addressed This Visit       Type 2 diabetes mellitus, without long-term current use of insulin (Multi)     Patient is seen in the office ([facility]) for CGM placement. Sensor was placed on left upper arm by his wife. Frederic was installed on his phone and connected to sensor. [employer] link is sent.       Clinical Pharmacist follow-up: 9/9 @12 PM, Telehealth visit    Continue all meds under the continuation of care with the referring provider and clinical pharmacy team.    Thank you,  Ricarda Wong, PharmD  Clinical Pharmacy Specialist     Verbal consent to manage patient's drug therapy was obtained from the patient. They were informed they may decline to participate or withdraw from participation in pharmacy services at any time.Fitting CGM

## 2024-08-30 ENCOUNTER — TELEMEDICINE (OUTPATIENT)
Dept: PHARMACY | Facility: HOSPITAL | Age: 59
End: 2024-08-30
Payer: COMMERCIAL

## 2024-08-30 DIAGNOSIS — E11.69 TYPE 2 DIABETES MELLITUS WITH OTHER SPECIFIED COMPLICATION, WITHOUT LONG-TERM CURRENT USE OF INSULIN (MULTI): ICD-10-CM

## 2024-09-05 ENCOUNTER — APPOINTMENT (OUTPATIENT)
Dept: CARDIOLOGY | Facility: CLINIC | Age: 59
End: 2024-09-05
Payer: COMMERCIAL

## 2024-09-09 ENCOUNTER — TELEPHONE (OUTPATIENT)
Dept: CARDIOLOGY | Facility: HOSPITAL | Age: 59
End: 2024-09-09

## 2024-09-09 ENCOUNTER — HOSPITAL ENCOUNTER (OUTPATIENT)
Dept: CARDIOLOGY | Facility: HOSPITAL | Age: 59
Discharge: HOME | End: 2024-09-09
Payer: COMMERCIAL

## 2024-09-09 ENCOUNTER — APPOINTMENT (OUTPATIENT)
Dept: PHARMACY | Facility: HOSPITAL | Age: 59
End: 2024-09-09
Payer: COMMERCIAL

## 2024-09-09 DIAGNOSIS — I50.32 CHRONIC DIASTOLIC (CONGESTIVE) HEART FAILURE (MULTI): ICD-10-CM

## 2024-09-09 DIAGNOSIS — R93.1 MYOCARDIAL PERFUSION DEFECT: ICD-10-CM

## 2024-09-09 DIAGNOSIS — E11.69 TYPE 2 DIABETES MELLITUS WITH OTHER SPECIFIED COMPLICATION, WITHOUT LONG-TERM CURRENT USE OF INSULIN (MULTI): ICD-10-CM

## 2024-09-09 DIAGNOSIS — R93.1 AGATSTON CORONARY ARTERY CALCIUM SCORE GREATER THAN 400: ICD-10-CM

## 2024-09-09 PROCEDURE — 93350 STRESS TTE ONLY: CPT | Performed by: INTERNAL MEDICINE

## 2024-09-09 PROCEDURE — 2500000005 HC RX 250 GENERAL PHARMACY W/O HCPCS: Performed by: NURSE PRACTITIONER

## 2024-09-09 PROCEDURE — 2500000004 HC RX 250 GENERAL PHARMACY W/ HCPCS (ALT 636 FOR OP/ED): Performed by: INTERNAL MEDICINE

## 2024-09-09 PROCEDURE — 93017 CV STRESS TEST TRACING ONLY: CPT

## 2024-09-09 PROCEDURE — 93018 CV STRESS TEST I&R ONLY: CPT | Performed by: INTERNAL MEDICINE

## 2024-09-09 PROCEDURE — 93016 CV STRESS TEST SUPVJ ONLY: CPT | Performed by: INTERNAL MEDICINE

## 2024-09-09 RX ORDER — ATROPINE SULFATE 0.1 MG/ML
.25-5 INJECTION INTRAVENOUS ONCE AS NEEDED
Status: DISCONTINUED | OUTPATIENT
Start: 2024-09-09 | End: 2024-09-10 | Stop reason: HOSPADM

## 2024-09-09 RX ORDER — DOBUTAMINE HYDROCHLORIDE 100 MG/100ML
5-40 INJECTION INTRAVENOUS CONTINUOUS
Status: DISCONTINUED | OUTPATIENT
Start: 2024-09-09 | End: 2024-09-10 | Stop reason: HOSPADM

## 2024-09-09 RX ORDER — METOPROLOL TARTRATE 1 MG/ML
2.5 INJECTION, SOLUTION INTRAVENOUS ONCE
Status: COMPLETED | OUTPATIENT
Start: 2024-09-09 | End: 2024-09-09

## 2024-09-09 NOTE — TELEPHONE ENCOUNTER
Per Dr. Núñez:    He had his dobutamine stress echo today.   Unfortunately he was not able to reach target heart rate with dobutamine.   At this time his options are to either do pharmacological stress MPI or cardiac cath.  Unlikely that a CT angiogram of the coronaries would be diagnostic with his body weight.  He has heart failure and elevated CT calcium score. The high calcium score is also a relative contraindication to the CT angiogram of the coronaries.    His other option is to not do any testing at all but that would not be recommended.      After reading the stress test, Dr. Núñez is saying even though he did not achieve target heart rate the stress echo is abnormal and she thinks the next step should be cardiac catheterization.  So skip noninvasive testing, please schedule with Bre to discuss cath.  He had wall motion abnormality noted at peak stress and LAD territory.  He is already scheduled to see Bre on Thursday.

## 2024-09-09 NOTE — PROGRESS NOTES
Clinical Pharmacy Appointment    Patient ID: Morro Seo is a 59 y.o. male who presents for Diabetes.    Pt is here for First appointment.     Referring Provider: Lisseth Thomas DO  PCP: Lisseth Thomas DO   Last visit with PCP: 8/8/2024   Next visit with PCP: 11/12/2024    Cardiology: Dr. Núñez     Subjective     Interval History  Started patient on Farxiga 10 mg daily  Placed CGM for patient in office.    HPI  Type II Diabetes  Current  Pharmacotherapy:   Acarbose 50 mg TID  Metformin 1000 mg BID  Januvia 100 mg daily  Farxiga 10 mg daily     SECONDARY PREVENTION  - Statin? Atorvastatin 80 mg   LDL: 16   TC: 76  - ACE-I/ARB? No   UACR: 55.6 (4/26/2021)   BP: 114/60  - Aspirin? Yes    -The ASCVD Risk score (Belem GARCIA, et al., 2019) failed to calculate for the following reasons:    The patient has a prior MI or stroke diagnosis      Current monitoring regimen:   Patient is using: CGM (FreeStyle Aliza)     SMBG Fasting Readings: has had a couple times where it dings going higher than 250  Currently 171    Pertinent PMH Review:  - PMH of Pancreatitis: n/a  - PMH/FH of Medullary Thyroid Cancer: n/a  - PMH of Retinopathy: n/a       Drug Interactions  No relevant drug interactions were noted.        Objective   Allergies   Allergen Reactions    Jardiance [Empagliflozin] Rash    Penicillins Unknown     Unknown reaction as child    Sulfamethoxazole-Trimethoprim Rash     Social History     Social History Narrative    Not on file      Medication Review  Current Outpatient Medications   Medication Instructions    acarbose (PRECOSE) 50 mg, oral, 3 times daily (morning, midday, late afternoon)    ammonium lactate (Lac-Hydrin) 12 % lotion USE 1 APPLICATION TO THE AFFECTED AREA EVERY NIGHT    aspirin 325 mg, oral, Daily    atorvastatin (LIPITOR) 80 mg, oral, Daily    budesonide-formoteroL (Symbicort) 160-4.5 mcg/actuation inhaler 2 puffs, inhalation, 2 times daily RT, Rinse mouth with water after use to reduce  aftertaste and incidence of candidiasis. Do not swallow.    buPROPion SR (WELLBUTRIN SR) 150 mg, oral, 2 times daily, Do not crush, chew, or split.    dapagliflozin propanediol (FARXIGA) 10 mg, oral, Daily    FreeStyle Aliza 3 Sensor device Use for continuous glucose monitoring. Change every 14 days.    gabapentin (NEURONTIN) 100 mg, oral, 2 times daily    ipratropium-albuteroL (Duo-Neb) 0.5-2.5 mg/3 mL nebulizer solution 3 mL, nebulization, Every 4 hours PRN    metFORMIN (GLUCOPHAGE) 1,000 mg, oral, 2 times daily (morning and late afternoon)    omeprazole (PRILOSEC) 40 mg, oral, Daily before breakfast, Do not crush or chew.    potassium chloride CR 10 mEq ER tablet 10 mEq, oral, Daily    SITagliptin phosphate (JANUVIA) 100 mg, oral, Daily    spironolactone (ALDACTONE) 25 mg, oral, Daily    tiotropium (Spiriva Respimat) 2.5 mcg/actuation inhaler 2 puffs, inhalation, Daily    torsemide (DEMADEX) 20 mg, oral, Daily, as directed      Vitals  BP Readings from Last 2 Encounters:   08/22/24 114/60   08/20/24 114/70     BMI Readings from Last 1 Encounters:   08/22/24 43.86 kg/m²      Labs  A1C  Lab Results   Component Value Date    HGBA1C 9.0 08/21/2024    HGBA1C 10.5 (H) 07/03/2024    HGBA1C 8.6 (H) 02/19/2024     BMP  Lab Results   Component Value Date    CALCIUM 9.7 07/03/2024     (L) 07/03/2024    K 4.4 07/03/2024    CO2 31 07/03/2024    CL 96 (L) 07/03/2024    BUN 19 07/03/2024    CREATININE 0.85 07/03/2024    EGFR >90 07/03/2024     LFTs  Lab Results   Component Value Date    ALT 23 07/03/2024    AST 17 07/03/2024    ALKPHOS 106 07/03/2024    BILITOT 1.5 (H) 07/03/2024     FLP  Lab Results   Component Value Date    TRIG 148 11/15/2023    CHOL 76 11/15/2023    LDLF 30 03/28/2022    LDLCALC 16 11/15/2023    HDL 30.5 11/15/2023     Urine Microalbumin  Lab Results   Component Value Date    MICROALBCREA 37.2 (H) 04/26/2021     Weight Management  Wt Readings from Last 3 Encounters:   08/22/24 135 kg (297 lb)    08/20/24 134 kg (296 lb)   08/08/24 137 kg (303 lb)      There is no height or weight on file to calculate BMI.     Patient Discussion:  Dr. Thomas took him off the celecoxib. Stopped for GI bleed  Pain is worsening significantly. Hard to accomplish ADLs.   Tylenol and Aleve with no relief.     Assessment/Plan   Problem List Items Addressed This Visit       Type 2 diabetes mellitus, without long-term current use of insulin (Multi)     Patient's diabetes is currenty uncontrolled, as shown by A1c of 9% (goal < 7%).     Patient is doing well with Farxiga. Has not seen or noticed any AE at this time. We will continue to monitor.    Patient was able to place his second CGM. He likes using it. We discuss blood sugar readings today. Patient was previously not testing so I would like to have a little more time on Farxiga + CGM prior to making other med changes. Can consider Trulicity for improved control if needed.      Plan:  CONTINUE Farxiga, acarbose, metformin and Januvia         Relevant Orders    Follow Up In Clinical Pharmacy       Clinical Pharmacist follow-up: 10/7 @ 12 PM, Telehealth visit    Continue all meds under the continuation of care with the referring provider and clinical pharmacy team.    Thank you,  Ricarda Wong, PharmD  Clinical Pharmacy Specialist     Verbal consent to manage patient's drug therapy was obtained from the patient. They were informed they may decline to participate or withdraw from participation in pharmacy services at any time.Fitting CGM

## 2024-09-09 NOTE — ASSESSMENT & PLAN NOTE
Patient's diabetes is currenty uncontrolled, as shown by A1c of 9% (goal < 7%).     Patient is doing well with Farxiga. Has not seen or noticed any AE at this time. We will continue to monitor.    Patient was able to place his second CGM. He likes using it. We discuss blood sugar readings today. Patient was previously not testing so I would like to have a little more time on Farxiga + CGM prior to making other med changes. Can consider Trulicity for improved control if needed.      Plan:  CONTINUE Farxiga, acarbose, metformin and Januvia

## 2024-09-09 NOTE — NURSING NOTE
Alea Mesa CNP in to patient's room to see patient and take a look at EKG changes. Per the CNP, Dr. Núñez notified of abnormal EKG.

## 2024-09-11 ENCOUNTER — TELEPHONE (OUTPATIENT)
Dept: CARDIOLOGY | Facility: HOSPITAL | Age: 59
End: 2024-09-11
Payer: COMMERCIAL

## 2024-09-11 ASSESSMENT — ENCOUNTER SYMPTOMS
EYES NEGATIVE: 1
NEAR-SYNCOPE: 0
STIFFNESS: 1
ORTHOPNEA: 0
SHORTNESS OF BREATH: 0
PALPITATIONS: 0
SYNCOPE: 0
GASTROINTESTINAL NEGATIVE: 1
ENDOCRINE NEGATIVE: 1
BRUISES/BLEEDS EASILY: 1
LIGHT-HEADEDNESS: 0
BACK PAIN: 1
DIZZINESS: 0

## 2024-09-11 NOTE — PROGRESS NOTES
"Primary Cardiologist: Dr. Núñez    Chief Complaint:   Follow-up     History Of Present Illness:    Morro Seo is a 59 y.o. male with past medical history of second-degree AV block and complete heart block now s/p PPM, chronic diastolic heart failure, hypertension, dyslipidemia, type 2 diabetes mellitus, polycythemia, sleep apnea on CPAP, smoking and obesity who presents today for follow up and abnormal stress test .     Today, Morro Seo is feeling fatigued and short of breath. Denies chest pain or pressure. Reports he has trouble completing daily tasks secondary to fatigue and dyspnea. Lower extremity edema has been stable, no dizziness or lightheadedness.       Last Recorded Vitals:  Visit Vitals  /70 (BP Location: Left arm, Patient Position: Sitting, BP Cuff Size: Large adult)   Pulse 76   Ht 1.753 m (5' 9\")   Wt 132 kg (291 lb 6.4 oz)   SpO2 93%   BMI 43.03 kg/m²   Smoking Status Every Day   BSA 2.54 m²        Past Medical History:  He has a past medical history of Complete heart block (Multi) (10/31/2023), Diverticulosis of intestine, part unspecified, without perforation or abscess without bleeding (01/30/2020), Personal history of other endocrine, nutritional and metabolic disease (01/30/2020), Personal history of other endocrine, nutritional and metabolic disease (01/30/2020), Personal history of other specified conditions (05/04/2018), Personal history of pneumonia (recurrent) (05/08/2018), Solitary pulmonary nodule (05/04/2018), and Unspecified open wound of unspecified toe(s) without damage to nail, sequela (08/11/2020).    Past Surgical History:  He has a past surgical history that includes Other surgical history (04/28/2020); Other surgical history (04/28/2020); Other surgical history (04/28/2020); Other surgical history (04/28/2020); and Other surgical history (07/23/2020).      Social History:  He reports that he has been smoking cigarettes. He has never used smokeless tobacco. He " reports that he does not currently use alcohol. He reports that he does not use drugs.    Family History:  Family History   Problem Relation Name Age of Onset    Heart attack Mother      Hypertension Mother      Coronary artery disease Father      Heart attack Father      Hypertension Father      Diabetes Sister      Diabetes Brother      Lung cancer Maternal Grandfather      Lung cancer Paternal Grandfather          Allergies:  Jardiance [empagliflozin], Penicillins, and Sulfamethoxazole-trimethoprim    Outpatient Medications:  Current Outpatient Medications   Medication Instructions    acarbose (PRECOSE) 50 mg, oral, 3 times daily (morning, midday, late afternoon)    ammonium lactate (Lac-Hydrin) 12 % lotion USE 1 APPLICATION TO THE AFFECTED AREA EVERY NIGHT    aspirin 325 mg, oral, Daily    atorvastatin (LIPITOR) 80 mg, oral, Daily    budesonide-formoteroL (Symbicort) 160-4.5 mcg/actuation inhaler 2 puffs, inhalation, 2 times daily RT, Rinse mouth with water after use to reduce aftertaste and incidence of candidiasis. Do not swallow.    buPROPion SR (WELLBUTRIN SR) 150 mg, oral, 2 times daily, Do not crush, chew, or split.    dapagliflozin propanediol (FARXIGA) 10 mg, oral, Daily    FreeStyle Aliza 3 Sensor device Use for continuous glucose monitoring. Change every 14 days.    gabapentin (NEURONTIN) 100 mg, oral, 2 times daily    ipratropium-albuteroL (Duo-Neb) 0.5-2.5 mg/3 mL nebulizer solution 3 mL, nebulization, Every 4 hours PRN    metFORMIN (GLUCOPHAGE) 1,000 mg, oral, 2 times daily (morning and late afternoon)    omeprazole (PRILOSEC) 40 mg, oral, Daily before breakfast, Do not crush or chew.    potassium chloride CR 10 mEq ER tablet 10 mEq, oral, Daily    SITagliptin phosphate (JANUVIA) 100 mg, oral, Daily    spironolactone (ALDACTONE) 25 mg, oral, Daily    tiotropium (Spiriva Respimat) 2.5 mcg/actuation inhaler 2 puffs, inhalation, Daily    torsemide (DEMADEX) 20 mg, oral, Daily, as directed          Review of Systems   Constitutional: Positive for malaise/fatigue (worsening).   HENT: Negative.     Eyes: Negative.    Cardiovascular:  Positive for dyspnea on exertion (stable). Negative for chest pain, leg swelling (improved), near-syncope, orthopnea, palpitations and syncope.   Respiratory:  Negative for shortness of breath.    Endocrine: Negative.    Hematologic/Lymphatic: Bruises/bleeds easily (easy bruising).   Skin: Negative.    Musculoskeletal:  Positive for arthritis, back pain, joint pain (bilateral hips/knees) and stiffness.   Gastrointestinal: Negative.    Genitourinary: Negative.    Neurological:  Negative for dizziness and light-headedness.        Physical Exam  Vitals reviewed.   Constitutional:       Appearance: Normal appearance.   HENT:      Head: Normocephalic.      Mouth/Throat:      Mouth: Mucous membranes are moist.   Cardiovascular:      Rate and Rhythm: Normal rate and regular rhythm.      Pulses: Normal pulses.      Heart sounds: Normal heart sounds.   Pulmonary:      Effort: Pulmonary effort is normal.      Breath sounds: Normal breath sounds. No wheezing, rhonchi or rales.   Abdominal:      General: Bowel sounds are normal. There is no distension.      Palpations: Abdomen is soft.      Tenderness: There is no abdominal tenderness.   Musculoskeletal:      Cervical back: Normal range of motion.      Right lower leg: Edema (trace) present.      Left lower leg: Edema (trace) present.   Skin:     General: Skin is warm and dry.      Comments: BLE coloring of venous stasis   Neurological:      General: No focal deficit present.      Mental Status: He is alert and oriented to person, place, and time.   Psychiatric:         Mood and Affect: Mood normal.         Behavior: Behavior normal.           Last Labs:  CBC -  Lab Results   Component Value Date    WBC 9.2 03/25/2024    HGB 17.0 03/25/2024    HCT 50.2 03/25/2024    MCV 89 03/25/2024     03/25/2024       CMP -  Lab Results  "  Component Value Date    CALCIUM 9.7 07/03/2024    PROT 6.6 07/03/2024    ALBUMIN 4.4 07/03/2024    AST 17 07/03/2024    ALT 23 07/03/2024    ALKPHOS 106 07/03/2024    BILITOT 1.5 (H) 07/03/2024       LIPID PANEL -   Lab Results   Component Value Date    CHOL 76 11/15/2023    TRIG 148 11/15/2023    HDL 30.5 11/15/2023    CHHDL 2.5 11/15/2023    LDLF 30 03/28/2022    VLDL 30 11/15/2023    NHDL 46 11/15/2023       RENAL FUNCTION PANEL -   Lab Results   Component Value Date    GLUCOSE 274 (H) 07/03/2024     (L) 07/03/2024    K 4.4 07/03/2024    CL 96 (L) 07/03/2024    CO2 31 07/03/2024    ANIONGAP 12 07/03/2024    BUN 19 07/03/2024    CREATININE 0.85 07/03/2024    GFRMALE >90 09/18/2023    CALCIUM 9.7 07/03/2024    ALBUMIN 4.4 07/03/2024        Lab Results   Component Value Date    BNP 11 08/04/2023    HGBA1C 9.0 08/21/2024       Last Cardiology Tests:  ECG:  A sensed V paced rhythm. Rate of 80 bpm. This will go to Dr. Núñez for further review.     Echo:  TTE 5/18/23: Left ventricular systolic function is normal with a 60-65% estimated ejection fraction.  2. Spectral Doppler shows a pseudonormal pattern of left ventricular diastolic filling.  3. There is moderate concentric left ventricular hypertrophy.  4. Moderately enlarged right ventricle.     TTE 1/31/22: The left ventricular systolic function is normal with a 60-65% estimated ejection fraction.  2. Moderately enlarged right ventricle.  3. The left atrium is moderately dilated.  4. Aortic valve sclerosis.   No results found for this or any previous visit from the past 1095 days.      Ejection Fractions:  No results found for: \"EF\"    Cath:  No results found for this or any previous visit from the past 1095 days.      Stress Test:  Dobutamine exercise stress echo 9/9/24  Summary:   1. Abnormal Stress Test.   2. Akinetic apical segments at peak stress.   3. At peak, there are stress-induced regional wall motion abnormalities.   4. Submaximal level of " "stress achieved.      Nuclear Stress test 1/13/20:1. Normal stress myocardial perfusion imaging in response to  pharmacologic stress.  2. Well-maintained left ventricular function.       Cardiac Imaging:  No results found for this or any previous visit from the past 1095 days.        Lab review: I have personally reviewed the laboratory result(s)     Assessment/Plan     Morro Seo is a 58 y.o. male with past medical history of second-degree AV block and complete heart block now s/p PPM, chronic diastolic heart failure, hypertension, dyslipidemia, type 2 diabetes mellitus, polycythemia, sleep apnea on CPAP, smoking and obesity who presents today for follow up.     Severe coronary artery calcifications/Abnormal stress test  As seen on CT chest with elevated Ca score of 1719  Patient previously reported occasional chest pain with \"severe exertion but I believe it to be musculoskeletal\" Difficult for him to tease out additional characteristics  Patient has chronic shortness of breath on exertion, endorses worsening fatigue today.  Has significant risk factors for CAD, last ischemic eval in 2020 with normal MPI stress.  Dobutamine exercise stress echo 9/9/24: Abnormal Stress Test. Akinetic apical segments at peak stress.  At peak, there are stress-induced regional wall motion abnormalities. Submaximal level of stress achieved.  Risks, benefits, alternatives of Cardiac catheterization possible angioplasty and stenting including risk of Death, Stroke, MI, bleeding complications and renal failure were explained to patient and patient agreed to proceed   LDL is at goal  Blood pressure is well controlled  Continue on ASA, high-intensity statin    Chronic diastolic heart failure  TTE 5/18/23:TTE 5/18/23: Left ventricular systolic function is normal with a 60-65% estimated ejection fraction.Spectral Doppler shows a pseudonormal pattern of left ventricular diastolic filling.There is moderate concentric left ventricular " hypertrophy. Moderately enlarged right ventricle.  Chest x-ray 5/12/23: Mild pulmonary edema. No consolidation seen  BNP 5/10/23: 26  Today, patient has stable lower extremity edema, non pitting  Continue on torsemide, spironolactone  Recent BMP with stable kidney function, Cr 0.85 (7/3/24)  Following with podiatry for left leg edema/wound       Complete heart block  s/p PPM  Follows with device clinic     Hypertensive heart disease  TTE with mild-mod concentric LVH  Today BP in office 122/70, controlled  Taken off lisinopril per PCP  Follow up echo ordered prior to next OP visit with Dr. Núñez -11/24     Nonhealing lower extremity ulcer  Resolved, healed  ANNA was normal.  Following with Podiatry     Tobacco use  Smokes 1ppd, discussed smoking cessation again today. He reports he has cut back but cannot give up smoking at this time.      Bre Sosa, APRN-CNP

## 2024-09-11 NOTE — TELEPHONE ENCOUNTER
Called him with results and plan.  He confirmed he is planning on coming for his follow up visit with Bre tomorrow.

## 2024-09-12 ENCOUNTER — OFFICE VISIT (OUTPATIENT)
Dept: CARDIOLOGY | Facility: HOSPITAL | Age: 59
End: 2024-09-12
Payer: COMMERCIAL

## 2024-09-12 VITALS
HEIGHT: 69 IN | DIASTOLIC BLOOD PRESSURE: 70 MMHG | HEART RATE: 76 BPM | BODY MASS INDEX: 43.16 KG/M2 | WEIGHT: 291.4 LBS | OXYGEN SATURATION: 93 % | SYSTOLIC BLOOD PRESSURE: 122 MMHG

## 2024-09-12 DIAGNOSIS — I50.32 CHRONIC DIASTOLIC (CONGESTIVE) HEART FAILURE (MULTI): ICD-10-CM

## 2024-09-12 DIAGNOSIS — R94.39: ICD-10-CM

## 2024-09-12 DIAGNOSIS — Z95.0 STATUS CARDIAC PACEMAKER: ICD-10-CM

## 2024-09-12 DIAGNOSIS — I51.7 LEFT VENTRICULAR HYPERTROPHY: ICD-10-CM

## 2024-09-12 DIAGNOSIS — E78.5 HYPERLIPIDEMIA, UNSPECIFIED HYPERLIPIDEMIA TYPE: Primary | ICD-10-CM

## 2024-09-12 DIAGNOSIS — R93.1 AGATSTON CORONARY ARTERY CALCIUM SCORE GREATER THAN 400: ICD-10-CM

## 2024-09-12 DIAGNOSIS — I10 ESSENTIAL HYPERTENSION: ICD-10-CM

## 2024-09-12 PROCEDURE — 93005 ELECTROCARDIOGRAM TRACING: CPT | Performed by: CLINICAL NURSE SPECIALIST

## 2024-09-12 PROCEDURE — 99214 OFFICE O/P EST MOD 30 MIN: CPT | Performed by: CLINICAL NURSE SPECIALIST

## 2024-09-12 ASSESSMENT — ENCOUNTER SYMPTOMS: DYSPNEA ON EXERTION: 1

## 2024-09-12 NOTE — PATIENT INSTRUCTIONS
A heart catheterization has been ordered to evaluate your coronary arteries based on your abnormal stress test.   Labs have been ordered to be completed prior to procedure.  A nurse from our office will call with instructions prior to the procedure.   Call our office with any new cardiac concerns  Continue current medications  Continue heart-healthy diet. A diet low in sodium, low in cholesterol, limiting red meats and eating whole foods.   Follow up with SANDRA after procedure.

## 2024-09-13 ENCOUNTER — TELEPHONE (OUTPATIENT)
Dept: PREADMISSION TESTING | Facility: HOSPITAL | Age: 59
End: 2024-09-13
Payer: COMMERCIAL

## 2024-09-20 ENCOUNTER — PRE-ADMISSION TESTING (OUTPATIENT)
Dept: PREADMISSION TESTING | Facility: HOSPITAL | Age: 59
End: 2024-09-20
Payer: COMMERCIAL

## 2024-09-20 ENCOUNTER — OFFICE VISIT (OUTPATIENT)
Dept: PULMONOLOGY | Facility: HOSPITAL | Age: 59
End: 2024-09-20
Payer: COMMERCIAL

## 2024-09-20 VITALS
BODY MASS INDEX: 43.52 KG/M2 | HEIGHT: 69 IN | RESPIRATION RATE: 16 BRPM | OXYGEN SATURATION: 95 % | HEART RATE: 71 BPM | SYSTOLIC BLOOD PRESSURE: 114 MMHG | DIASTOLIC BLOOD PRESSURE: 65 MMHG | WEIGHT: 293.8 LBS

## 2024-09-20 DIAGNOSIS — F17.210 CIGARETTE SMOKER: Primary | ICD-10-CM

## 2024-09-20 DIAGNOSIS — I50.32 CHRONIC DIASTOLIC (CONGESTIVE) HEART FAILURE: ICD-10-CM

## 2024-09-20 DIAGNOSIS — J44.9 CHRONIC OBSTRUCTIVE PULMONARY DISEASE, UNSPECIFIED COPD TYPE (MULTI): ICD-10-CM

## 2024-09-20 DIAGNOSIS — E11.69 TYPE 2 DIABETES MELLITUS WITH OTHER SPECIFIED COMPLICATION, WITHOUT LONG-TERM CURRENT USE OF INSULIN: ICD-10-CM

## 2024-09-20 DIAGNOSIS — G47.33 OSA (OBSTRUCTIVE SLEEP APNEA): ICD-10-CM

## 2024-09-20 DIAGNOSIS — Z01.818 PRE-OP EVALUATION: Primary | ICD-10-CM

## 2024-09-20 PROCEDURE — 3074F SYST BP LT 130 MM HG: CPT | Performed by: NURSE PRACTITIONER

## 2024-09-20 PROCEDURE — 4004F PT TOBACCO SCREEN RCVD TLK: CPT | Performed by: NURSE PRACTITIONER

## 2024-09-20 PROCEDURE — 3008F BODY MASS INDEX DOCD: CPT | Performed by: NURSE PRACTITIONER

## 2024-09-20 PROCEDURE — 3078F DIAST BP <80 MM HG: CPT | Performed by: NURSE PRACTITIONER

## 2024-09-20 PROCEDURE — 3052F HG A1C>EQUAL 8.0%<EQUAL 9.0%: CPT | Performed by: NURSE PRACTITIONER

## 2024-09-20 PROCEDURE — 99213 OFFICE O/P EST LOW 20 MIN: CPT | Performed by: NURSE PRACTITIONER

## 2024-09-20 RX ORDER — TIOTROPIUM BROMIDE INHALATION SPRAY 3.12 UG/1
2 SPRAY, METERED RESPIRATORY (INHALATION) DAILY
Qty: 4 G | Refills: 11 | Status: SHIPPED | OUTPATIENT
Start: 2024-09-20

## 2024-09-20 RX ORDER — BUDESONIDE AND FORMOTEROL FUMARATE DIHYDRATE 160; 4.5 UG/1; UG/1
2 AEROSOL RESPIRATORY (INHALATION)
Qty: 10.2 G | Refills: 11 | Status: SHIPPED | OUTPATIENT
Start: 2024-09-20

## 2024-09-20 RX ORDER — ALBUTEROL SULFATE 90 UG/1
2 INHALANT RESPIRATORY (INHALATION) EVERY 4 HOURS PRN
Qty: 18 G | Refills: 11 | Status: SHIPPED | OUTPATIENT
Start: 2024-09-20

## 2024-09-20 RX ORDER — ALBUTEROL SULFATE 90 UG/1
2 INHALANT RESPIRATORY (INHALATION) EVERY 4 HOURS PRN
Qty: 18 G | Refills: 11 | Status: SHIPPED | OUTPATIENT
Start: 2024-09-20 | End: 2024-09-20 | Stop reason: SDUPTHER

## 2024-09-20 RX ORDER — IPRATROPIUM BROMIDE AND ALBUTEROL SULFATE 2.5; .5 MG/3ML; MG/3ML
3 SOLUTION RESPIRATORY (INHALATION) EVERY 4 HOURS PRN
Qty: 360 ML | Refills: 11 | Status: SHIPPED | OUTPATIENT
Start: 2024-09-20

## 2024-09-20 ASSESSMENT — ENCOUNTER SYMPTOMS
CHILLS: 0
WHEEZING: 0
COUGH: 0
SHORTNESS OF BREATH: 1
RHINORRHEA: 0
UNEXPECTED WEIGHT CHANGE: 0
FEVER: 0
FATIGUE: 0

## 2024-09-20 NOTE — PROGRESS NOTES
Subjective   Patient ID: Morro Seo is a 59 y.o. male who presents for follow up COPD.     HPI: Patient has  PMH of COPD was diagnosed around 2015 by Dr. Jefferson PCP in [place]. He had PFTs in June 2018 ordered by Dr. Bayron Allred showed FEV1 48-56%, %. He started smoking consistently at age of 19 and has smoked 2-3 ppd x 38 years and now since 2021 he has been smoking 1 to 1.5 ppd after he retired as a . He now notices coughing spells and shortness of breath on any activity. He has NAVYA and uses CPAP nightly. He denies any acute worsening but notes progressively worsening shortness of breath.      Today he is here for follow up. He states that breathing has been the same and he has a dry cough. He needs a new albuterol inhaler. He states that he is smoking 1 ppd. He is compliant with his CPAP nightly. He has no other concerns.     Review of Systems   Constitutional:  Negative for chills, fatigue, fever and unexpected weight change.   HENT:  Negative for congestion, postnasal drip and rhinorrhea.    Respiratory:  Positive for shortness of breath. Negative for cough (denies hemoptysis.) and wheezing.    Cardiovascular:  Negative for chest pain and leg swelling.   All other systems reviewed and are negative.      Objective   Physical Exam  Vitals reviewed.   Constitutional:       Appearance: Normal appearance.   HENT:      Head: Normocephalic.   Cardiovascular:      Rate and Rhythm: Normal rate and regular rhythm.   Pulmonary:      Effort: Pulmonary effort is normal.      Breath sounds: Decreased breath sounds present.   Skin:     General: Skin is warm and dry.   Neurological:      Mental Status: He is alert.       Assessment/Plan   1. COPD  2. Chronic cor pulmonale  3. Tobacco dependence  4. NAVYA  5. Morbid Obesity  6. Right basal granuloma of lung 2020  7. Bilateral leg edema  8. Polycythemia  9. Hx of heavy alcohol use     Plan:  Patient appears to have clinically severe COPD besides morbid obesity  contributing to his TELLO. His prior PFTs in 2018 showed FEV1 48-56%, %. Repeat PFTs done with FEV1 46-51.  -6MWT done and he did not qualify for oxygen.   -He is smoking at 1 ppd and has smoked the past 40 years at 1-1.5 ppd.   -CXR done with pulmonary edema.   -LDCT done and recommends continue yearly screening in March 2025, order placed today.  -Continue on Symbicort and Spiriva.   -Continue albuterol as needed.  -continue to use CPAP nightly  -He had severe coronary artery calcifications on LDCT, he is scheduled for heart cath next month.     Overall we will continue current regimen and get LDCT end of March. He states they come back from [place] in the end of April so I will bring him back with Dr. Brandon in May.     Total time:  20 min.

## 2024-09-20 NOTE — PATIENT INSTRUCTIONS
Continue on Symbicort and Spiriva.   Continue on albuterol as needed.  Please get CT scan in the end of March.  Continue on CPAP nightly.   Call with any questions or concerns.  Follow up with Dr. Brandon in end of May.

## 2024-09-23 ENCOUNTER — APPOINTMENT (OUTPATIENT)
Dept: HEMATOLOGY/ONCOLOGY | Facility: CLINIC | Age: 59
End: 2024-09-23
Payer: COMMERCIAL

## 2024-09-27 ENCOUNTER — APPOINTMENT (OUTPATIENT)
Dept: OPERATING ROOM | Facility: HOSPITAL | Age: 59
End: 2024-09-27
Payer: COMMERCIAL

## 2024-09-30 ENCOUNTER — HOSPITAL ENCOUNTER (OUTPATIENT)
Dept: CARDIOLOGY | Facility: HOSPITAL | Age: 59
Discharge: HOME | End: 2024-09-30
Payer: COMMERCIAL

## 2024-09-30 DIAGNOSIS — Z95.0 PRESENCE OF CARDIAC PACEMAKER: Primary | ICD-10-CM

## 2024-09-30 DIAGNOSIS — Z95.0 PRESENCE OF CARDIAC PACEMAKER: ICD-10-CM

## 2024-09-30 DIAGNOSIS — I44.2 COMPLETE HEART BLOCK: ICD-10-CM

## 2024-09-30 PROCEDURE — 93280 PM DEVICE PROGR EVAL DUAL: CPT

## 2024-10-01 ENCOUNTER — LAB (OUTPATIENT)
Dept: LAB | Facility: LAB | Age: 59
End: 2024-10-01
Payer: COMMERCIAL

## 2024-10-01 DIAGNOSIS — R94.39: ICD-10-CM

## 2024-10-01 LAB
ANION GAP SERPL CALC-SCNC: 12 MMOL/L (ref 10–20)
BUN SERPL-MCNC: 13 MG/DL (ref 6–23)
CALCIUM SERPL-MCNC: 9.3 MG/DL (ref 8.6–10.3)
CHLORIDE SERPL-SCNC: 99 MMOL/L (ref 98–107)
CO2 SERPL-SCNC: 31 MMOL/L (ref 21–32)
CREAT SERPL-MCNC: 0.72 MG/DL (ref 0.5–1.3)
EGFRCR SERPLBLD CKD-EPI 2021: >90 ML/MIN/1.73M*2
ERYTHROCYTE [DISTWIDTH] IN BLOOD BY AUTOMATED COUNT: 13.6 % (ref 11.5–14.5)
GLUCOSE SERPL-MCNC: 252 MG/DL (ref 74–99)
HCT VFR BLD AUTO: 51.2 % (ref 41–52)
HGB BLD-MCNC: 16.8 G/DL (ref 13.5–17.5)
MAGNESIUM SERPL-MCNC: 1.71 MG/DL (ref 1.6–2.4)
MCH RBC QN AUTO: 30.6 PG (ref 26–34)
MCHC RBC AUTO-ENTMCNC: 32.8 G/DL (ref 32–36)
MCV RBC AUTO: 93 FL (ref 80–100)
NRBC BLD-RTO: 0 /100 WBCS (ref 0–0)
PLATELET # BLD AUTO: 179 X10*3/UL (ref 150–450)
POTASSIUM SERPL-SCNC: 4.5 MMOL/L (ref 3.5–5.3)
RBC # BLD AUTO: 5.49 X10*6/UL (ref 4.5–5.9)
SODIUM SERPL-SCNC: 137 MMOL/L (ref 136–145)
WBC # BLD AUTO: 7.6 X10*3/UL (ref 4.4–11.3)

## 2024-10-01 PROCEDURE — 36415 COLL VENOUS BLD VENIPUNCTURE: CPT

## 2024-10-01 PROCEDURE — 85027 COMPLETE CBC AUTOMATED: CPT

## 2024-10-01 PROCEDURE — 83540 ASSAY OF IRON: CPT | Performed by: NURSE PRACTITIONER

## 2024-10-01 PROCEDURE — 83735 ASSAY OF MAGNESIUM: CPT

## 2024-10-01 PROCEDURE — 80048 BASIC METABOLIC PNL TOTAL CA: CPT

## 2024-10-01 PROCEDURE — 82728 ASSAY OF FERRITIN: CPT | Performed by: NURSE PRACTITIONER

## 2024-10-02 ENCOUNTER — TELEPHONE (OUTPATIENT)
Dept: CARDIOLOGY | Facility: HOSPITAL | Age: 59
End: 2024-10-02
Payer: COMMERCIAL

## 2024-10-02 NOTE — TELEPHONE ENCOUNTER
10/3/24  1620  This is a plan of care note for a 59 year old male patient with a history of polycythemia, LVH, HLP, HTN, pacer secondary to heart block, DM, heart failure who was referred for a heart catheterization due to and abnormal DSE which showed regional wall abnormalities along with akinesis in the apical region;  patient had DSE for SOB and fatigue.    Patient has no allergy to IV dye  Patient has a stable serum creatinine  Patient has recent labs and EKG  Patient does take metformin  Patient does not take OAC    Called to give pre procedure instructions for Left heart cath  to include  Date of procedure, show time of procedure and procedure time.  NPO after midnight x for asa  To hold metformin two days before the procedure and two days after procedure.  To have a ride due to sedation  To shower the night before and wear loose comfortable clothes  To bring an overnight bag in case of overnight stay  To bring an ID card, insurance card and list of medications.    Patient verbalized understanding of instructions.      10/1/24  1555  Called patient; no answer. Left voice message for patient to return call for hearth cath instructions.    ----- Message from Wilton FREY sent at 10/1/2024  1:42 PM EDT -----  Regarding: Left Heart Cath (MM)  Left heart cath 10/9 procedure time 10:30, arrival time 9:30, please call patient with instructions.

## 2024-10-03 NOTE — TELEPHONE ENCOUNTER
Patient returned call for Nurse instructions. Patient verbalized understanding that nurse will return call.

## 2024-10-04 ENCOUNTER — TELEPHONE (OUTPATIENT)
Dept: CARDIOLOGY | Facility: HOSPITAL | Age: 59
End: 2024-10-04
Payer: COMMERCIAL

## 2024-10-04 NOTE — TELEPHONE ENCOUNTER
Patient wanted to make sure having phlebotomy at his hematology visit prior to heart cath would be safe or if he should reschedule.  Reviewed with Dr. Núñez and Dr. Davis.  Recommendation is to drink plenty of fluids after phlebotomy before the cath so he is well hydrated.  He verbalized understanding.

## 2024-10-04 NOTE — TELEPHONE ENCOUNTER
Patient was calling to talk with nurse, left VM, forgot  to ask  a question about another upcoming procedure.

## 2024-10-07 ENCOUNTER — APPOINTMENT (OUTPATIENT)
Dept: HEMATOLOGY/ONCOLOGY | Facility: CLINIC | Age: 59
End: 2024-10-07
Payer: COMMERCIAL

## 2024-10-07 ENCOUNTER — OFFICE VISIT (OUTPATIENT)
Dept: HEMATOLOGY/ONCOLOGY | Facility: CLINIC | Age: 59
End: 2024-10-07
Payer: COMMERCIAL

## 2024-10-07 ENCOUNTER — APPOINTMENT (OUTPATIENT)
Dept: PHARMACY | Facility: HOSPITAL | Age: 59
End: 2024-10-07
Payer: COMMERCIAL

## 2024-10-07 ENCOUNTER — INFUSION (OUTPATIENT)
Dept: HEMATOLOGY/ONCOLOGY | Facility: CLINIC | Age: 59
End: 2024-10-07
Payer: COMMERCIAL

## 2024-10-07 VITALS — SYSTOLIC BLOOD PRESSURE: 110 MMHG | DIASTOLIC BLOOD PRESSURE: 64 MMHG | HEART RATE: 75 BPM

## 2024-10-07 VITALS
BODY MASS INDEX: 43.1 KG/M2 | OXYGEN SATURATION: 94 % | RESPIRATION RATE: 16 BRPM | SYSTOLIC BLOOD PRESSURE: 124 MMHG | DIASTOLIC BLOOD PRESSURE: 74 MMHG | HEIGHT: 69 IN | HEART RATE: 76 BPM | WEIGHT: 291.01 LBS | TEMPERATURE: 97.7 F

## 2024-10-07 DIAGNOSIS — E11.69 TYPE 2 DIABETES MELLITUS WITH OTHER SPECIFIED COMPLICATION, WITHOUT LONG-TERM CURRENT USE OF INSULIN: ICD-10-CM

## 2024-10-07 DIAGNOSIS — D75.1 ERYTHROCYTOSIS: ICD-10-CM

## 2024-10-07 DIAGNOSIS — D75.1 ERYTHROCYTOSIS: Primary | ICD-10-CM

## 2024-10-07 DIAGNOSIS — E11.65 TYPE 2 DIABETES MELLITUS WITH HYPERGLYCEMIA, WITHOUT LONG-TERM CURRENT USE OF INSULIN: Primary | ICD-10-CM

## 2024-10-07 LAB
FERRITIN SERPL-MCNC: 78 NG/ML (ref 20–300)
IRON SATN MFR SERPL: 19 % (ref 25–45)
IRON SERPL-MCNC: 64 UG/DL (ref 35–150)
TIBC SERPL-MCNC: 340 UG/DL (ref 240–445)
UIBC SERPL-MCNC: 276 UG/DL (ref 110–370)

## 2024-10-07 PROCEDURE — 99214 OFFICE O/P EST MOD 30 MIN: CPT | Performed by: NURSE PRACTITIONER

## 2024-10-07 PROCEDURE — 3074F SYST BP LT 130 MM HG: CPT | Performed by: NURSE PRACTITIONER

## 2024-10-07 PROCEDURE — 3008F BODY MASS INDEX DOCD: CPT | Performed by: NURSE PRACTITIONER

## 2024-10-07 PROCEDURE — 3078F DIAST BP <80 MM HG: CPT | Performed by: NURSE PRACTITIONER

## 2024-10-07 PROCEDURE — 3052F HG A1C>EQUAL 8.0%<EQUAL 9.0%: CPT | Performed by: NURSE PRACTITIONER

## 2024-10-07 PROCEDURE — 36415 COLL VENOUS BLD VENIPUNCTURE: CPT | Performed by: NURSE PRACTITIONER

## 2024-10-07 PROCEDURE — 99195 PHLEBOTOMY: CPT

## 2024-10-07 RX ORDER — ALBUTEROL SULFATE 0.83 MG/ML
3 SOLUTION RESPIRATORY (INHALATION) AS NEEDED
Status: DISCONTINUED | OUTPATIENT
Start: 2024-10-07 | End: 2024-10-07 | Stop reason: HOSPADM

## 2024-10-07 RX ORDER — ALBUTEROL SULFATE 0.83 MG/ML
3 SOLUTION RESPIRATORY (INHALATION) AS NEEDED
OUTPATIENT
Start: 2024-10-28

## 2024-10-07 RX ORDER — EPINEPHRINE 0.3 MG/.3ML
0.3 INJECTION SUBCUTANEOUS EVERY 5 MIN PRN
OUTPATIENT
Start: 2024-10-28

## 2024-10-07 RX ORDER — FAMOTIDINE 10 MG/ML
20 INJECTION INTRAVENOUS ONCE AS NEEDED
OUTPATIENT
Start: 2024-10-28

## 2024-10-07 RX ORDER — HEPARIN 100 UNIT/ML
500 SYRINGE INTRAVENOUS AS NEEDED
OUTPATIENT
Start: 2024-10-07

## 2024-10-07 RX ORDER — HEPARIN SODIUM,PORCINE/PF 10 UNIT/ML
50 SYRINGE (ML) INTRAVENOUS AS NEEDED
OUTPATIENT
Start: 2024-10-07

## 2024-10-07 RX ORDER — DIPHENHYDRAMINE HYDROCHLORIDE 50 MG/ML
50 INJECTION INTRAMUSCULAR; INTRAVENOUS AS NEEDED
OUTPATIENT
Start: 2024-10-28

## 2024-10-07 RX ORDER — FAMOTIDINE 10 MG/ML
20 INJECTION INTRAVENOUS ONCE AS NEEDED
Status: DISCONTINUED | OUTPATIENT
Start: 2024-10-07 | End: 2024-10-07 | Stop reason: HOSPADM

## 2024-10-07 RX ORDER — EPINEPHRINE 0.3 MG/.3ML
0.3 INJECTION SUBCUTANEOUS EVERY 5 MIN PRN
Status: DISCONTINUED | OUTPATIENT
Start: 2024-10-07 | End: 2024-10-07 | Stop reason: HOSPADM

## 2024-10-07 RX ORDER — DIPHENHYDRAMINE HYDROCHLORIDE 50 MG/ML
50 INJECTION INTRAMUSCULAR; INTRAVENOUS AS NEEDED
Status: DISCONTINUED | OUTPATIENT
Start: 2024-10-07 | End: 2024-10-07 | Stop reason: HOSPADM

## 2024-10-07 ASSESSMENT — PAIN SCALES - GENERAL: PAINLEVEL: 7

## 2024-10-07 NOTE — PROGRESS NOTES
"Patient ID: Morro Seo is a 59 y.o. male.  Referring Physician: Rosanne M Casal, APRN-CNP, DNP  [address] [facility], [place] [id number]  Primary Care Provider: Lisseth Thomas DO  Visit Type: Follow Up      Subjective    Chief Complaint: Erythrocytosis   Interval History:    Morro Seo is here today for interval follow-up and treatment of secondary polycythemia. He reports he is overall doing okay.  He has continued receiving his therapeutic  phlebotomies every 8 weeks and tolerating well. Hematocrit last week wa 51.2%. He has chronic shortness of breath secondary to COPD and that remains unchanged. He recently had a cardiac workup and is scheduled to have a cardiac cath tomorrow. He has a pacemaker. States that he has cut down considerably on cigarettes and knows he needs to quit for good. Denies any chest pain, abdominal pain, bowel habit changes. Denies any night sweats, fevers, chills. He denies any indications  of bleeding. Remains on low dosage aspirin daily.     Past Medical History:  Secondary polycythemia: Initial consultation for erythrocytosis with Dr. Galicia as CBC done in October 2021 showed WBC 11.3, RBC 6.05, Hgb 18.2, Hct 56.9, Plt 187. He has a history of COPD, sleep apnea  and nicotine dependence. Henrry-2 negative, serum erythropoietin level 10.1. He has been receiving treatment with therapeutic phlebotomies.     Hypertension, left ventricular hypertrophy, status post pacemaker placement due to AV block, COPD, sleep apnea and obesity. No prior history of MI, CVA or VTE.     Social History:  He is a . Current every day smoker (1-1.5 ppd).     Objective   BSA: 2.54 meters squared  /74 (BP Location: Left arm, Patient Position: Sitting)   Pulse 76   Temp 36.5 °C (97.7 °F)   Resp 16   Ht 1.753 m (5' 9.02\")   Wt 132 kg (291 lb 0.1 oz)   SpO2 94%   BMI 42.95 kg/m²      has a past medical history of Complete heart block (Multi) (10/31/2023), " Diverticulosis of intestine, part unspecified, without perforation or abscess without bleeding (01/30/2020), Personal history of other endocrine, nutritional and metabolic disease (01/30/2020), Personal history of other endocrine, nutritional and metabolic disease (01/30/2020), Personal history of other specified conditions (05/04/2018), Personal history of pneumonia (recurrent) (05/08/2018), Solitary pulmonary nodule (05/04/2018), and Unspecified open wound of unspecified toe(s) without damage to nail, sequela (08/11/2020).   has a past surgical history that includes Other surgical history (04/28/2020); Other surgical history (04/28/2020); Other surgical history (04/28/2020); Other surgical history (04/28/2020); and Other surgical history (07/23/2020).  Family History   Problem Relation Name Age of Onset   • Heart attack Mother     • Hypertension Mother     • Coronary artery disease Father     • Heart attack Father     • Hypertension Father     • Diabetes Sister     • Diabetes Brother     • Lung cancer Maternal Grandfather     • Lung cancer Paternal Grandfather         Morro Seo  reports that he has been smoking cigarettes. He has never used smokeless tobacco.  He  reports that he does not currently use alcohol.  He  reports no history of drug use.    Physical Exam  Constitutional:       Appearance: He is obese.   HENT:      Head: Normocephalic.      Nose: Nose normal.      Mouth/Throat:      Mouth: Mucous membranes are moist.   Eyes:      Pupils: Pupils are equal, round, and reactive to light.   Cardiovascular:      Rate and Rhythm: Normal rate and regular rhythm.      Pulses: Normal pulses.      Heart sounds: Normal heart sounds.      Comments: Paced rhythm  Pulmonary:      Effort: Pulmonary effort is normal.      Breath sounds: Normal breath sounds.   Abdominal:      General: Bowel sounds are normal.      Palpations: Abdomen is soft.   Musculoskeletal:         General: Normal range of motion.   Skin:      General: Skin is warm and dry.   Neurological:      General: No focal deficit present.      Mental Status: He is alert and oriented to person, place, and time.   Psychiatric:         Mood and Affect: Mood normal.         Behavior: Behavior normal.     WBC   Date/Time Value Ref Range Status   10/01/2024 11:14 AM 7.6 4.4 - 11.3 x10*3/uL Final   03/25/2024 02:29 PM 9.2 4.4 - 11.3 x10*3/uL Final   02/19/2024 05:46 PM 9.6 4.4 - 11.3 x10*3/uL Final     nRBC   Date Value Ref Range Status   10/01/2024 0.0 0.0 - 0.0 /100 WBCs Final   02/19/2024 0.0 0.0 - 0.0 /100 WBCs Final   11/15/2023 0.0 0.0 - 0.0 /100 WBCs Final     RBC   Date Value Ref Range Status   10/01/2024 5.49 4.50 - 5.90 x10*6/uL Final   03/25/2024 5.62 4.50 - 5.90 x10*6/uL Final   02/19/2024 5.76 4.50 - 5.90 x10*6/uL Final     Hemoglobin   Date Value Ref Range Status   10/01/2024 16.8 13.5 - 17.5 g/dL Final   03/25/2024 17.0 13.5 - 17.5 g/dL Final   02/19/2024 17.2 13.5 - 17.5 g/dL Final     Hematocrit   Date Value Ref Range Status   10/01/2024 51.2 41.0 - 52.0 % Final   03/25/2024 50.2 41.0 - 52.0 % Final   02/19/2024 51.2 41.0 - 52.0 % Final     MCV   Date/Time Value Ref Range Status   10/01/2024 11:14 AM 93 80 - 100 fL Final   03/25/2024 02:29 PM 89 80 - 100 fL Final   02/19/2024 05:46 PM 89 80 - 100 fL Final     MCH   Date/Time Value Ref Range Status   10/01/2024 11:14 AM 30.6 26.0 - 34.0 pg Final   03/25/2024 02:29 PM 30.2 26.0 - 34.0 pg Final   02/19/2024 05:46 PM 29.9 26.0 - 34.0 pg Final     MCHC   Date/Time Value Ref Range Status   10/01/2024 11:14 AM 32.8 32.0 - 36.0 g/dL Final   03/25/2024 02:29 PM 33.9 32.0 - 36.0 g/dL Final   02/19/2024 05:46 PM 33.6 32.0 - 36.0 g/dL Final     RDW   Date/Time Value Ref Range Status   10/01/2024 11:14 AM 13.6 11.5 - 14.5 % Final   03/25/2024 02:29 PM 14.2 11.5 - 14.5 % Final   02/19/2024 05:46 PM 14.5 11.5 - 14.5 % Final     Platelets   Date/Time Value Ref Range Status   10/01/2024 11:14  150 - 450 x10*3/uL  "Final   03/25/2024 02:29  150 - 450 x10*3/uL Final   02/19/2024 05:46  150 - 450 x10*3/uL Final     No results found for: \"MPV\"  Neutrophils %   Date/Time Value Ref Range Status   03/25/2024 02:29 PM 70.2 40.0 - 80.0 % Final   02/19/2024 05:46 PM 78.4 40.0 - 80.0 % Final   11/15/2023 01:24 PM 73.8 40.0 - 80.0 % Final     Immature Granulocytes %, Automated   Date/Time Value Ref Range Status   03/25/2024 02:29 PM 0.4 0.0 - 0.9 % Final     Comment:     Immature Granulocyte Count (IG) includes promyelocytes, myelocytes and metamyelocytes but does not include bands. Percent differential counts (%) should be interpreted in the context of the absolute cell counts (cells/UL).   02/19/2024 05:46 PM 0.5 0.0 - 0.9 % Final     Comment:     Immature Granulocyte Count (IG) includes promyelocytes, myelocytes and metamyelocytes but does not include bands. Percent differential counts (%) should be interpreted in the context of the absolute cell counts (cells/UL).   11/15/2023 01:24 PM 0.4 0.0 - 0.9 % Final     Comment:     Immature Granulocyte Count (IG) includes promyelocytes, myelocytes and metamyelocytes but does not include bands. Percent differential counts (%) should be interpreted in the context of the absolute cell counts (cells/UL).     Lymphocytes %   Date/Time Value Ref Range Status   03/25/2024 02:29 PM 18.8 13.0 - 44.0 % Final   02/19/2024 05:46 PM 12.3 13.0 - 44.0 % Final   11/15/2023 01:24 PM 16.2 13.0 - 44.0 % Final     Monocytes %   Date/Time Value Ref Range Status   03/25/2024 02:29 PM 7.3 2.0 - 10.0 % Final   02/19/2024 05:46 PM 5.7 2.0 - 10.0 % Final   11/15/2023 01:24 PM 7.1 2.0 - 10.0 % Final     Eosinophils %   Date/Time Value Ref Range Status   03/25/2024 02:29 PM 3.0 0.0 - 6.0 % Final   02/19/2024 05:46 PM 2.8 0.0 - 6.0 % Final   11/15/2023 01:24 PM 2.1 0.0 - 6.0 % Final     Basophils %   Date/Time Value Ref Range Status   03/25/2024 02:29 PM 0.3 0.0 - 2.0 % Final   02/19/2024 05:46 PM 0.3 0.0 - " 2.0 % Final   11/15/2023 01:24 PM 0.4 0.0 - 2.0 % Final     Neutrophils Absolute   Date/Time Value Ref Range Status   03/25/2024 02:29 PM 6.46 1.20 - 7.70 x10*3/uL Final     Comment:     Percent differential counts (%) should be interpreted in the context of the absolute cell counts (cells/uL).   02/19/2024 05:46 PM 7.54 1.20 - 7.70 x10*3/uL Final     Comment:     Percent differential counts (%) should be interpreted in the context of the absolute cell counts (cells/uL).   11/15/2023 01:24 PM 6.13 1.20 - 7.70 x10*3/uL Final     Comment:     Percent differential counts (%) should be interpreted in the context of the absolute cell counts (cells/uL).     Immature Granulocytes Absolute, Automated   Date/Time Value Ref Range Status   03/25/2024 02:29 PM 0.04 0.00 - 0.70 x10*3/uL Final   02/19/2024 05:46 PM 0.05 0.00 - 0.70 x10*3/uL Final   11/15/2023 01:24 PM 0.03 0.00 - 0.70 x10*3/uL Final     Lymphocytes Absolute   Date/Time Value Ref Range Status   03/25/2024 02:29 PM 1.73 1.20 - 4.80 x10*3/uL Final   02/19/2024 05:46 PM 1.18 (L) 1.20 - 4.80 x10*3/uL Final   11/15/2023 01:24 PM 1.34 1.20 - 4.80 x10*3/uL Final     Monocytes Absolute   Date/Time Value Ref Range Status   03/25/2024 02:29 PM 0.67 0.10 - 1.00 x10*3/uL Final   02/19/2024 05:46 PM 0.55 0.10 - 1.00 x10*3/uL Final   11/15/2023 01:24 PM 0.59 0.10 - 1.00 x10*3/uL Final     Eosinophils Absolute   Date/Time Value Ref Range Status   03/25/2024 02:29 PM 0.28 0.00 - 0.70 x10*3/uL Final   02/19/2024 05:46 PM 0.27 0.00 - 0.70 x10*3/uL Final   11/15/2023 01:24 PM 0.17 0.00 - 0.70 x10*3/uL Final     Basophils Absolute   Date/Time Value Ref Range Status   03/25/2024 02:29 PM 0.03 0.00 - 0.10 x10*3/uL Final   02/19/2024 05:46 PM 0.03 0.00 - 0.10 x10*3/uL Final   11/15/2023 01:24 PM 0.03 0.00 - 0.10 x10*3/uL Final     Assessment/Plan    Assessment:    1.  Erythrocytosis (jak2 negative)- secondary polycythemia due to COPD, smoking, sleep apnea, obesity. Therapeutic  phlebotomies every 12  weeks, for hematocrit  greater than 50% tolerating well. H/H  today:      2   sleep apnea, COPD, nicotine dependence, uses CPAP. Not ready to quit smoking     3.  Morbid Obesity      4.  Hypertension      5.  Left ventricular hypertrophy     6.  Diabetes mellitus     Plan:  Therapeutic phlebotomy today for hematocrit 51.2% since he is so close to goal. Discussed with patient: Because polycythemia caused by cardiopulmonary disease is an appropriate response to tissue hypoxia, attempts to reduce RBC mass by phlebotomy may exacerbate tissue hypoxia. Consequently, phlebotomy is not often utilized unless there is extreme elevation of Hct (eg, =65 percent) or symptoms attributable to increased blood volume/hyperviscosity (eg, fatigue, headache, blurred vision, transient loss of vision, paresthesias, slow mentation). In such settings, cautious phlebotomy (eg, removal of 250 mL replaced with an equal volume of crystalloid) to reduce Hct to 55 to 60 percent may provide relief of these symptoms; however, reduction of Hct to <55 percent range is likely to exacerbate dyspnea or other hypoxic symptoms.     Will continue therapeutic phlebotomies every 12  week for goal hematocrit <50%. He will return for phlebotomy in 6 months for &follow-up visit with labs prior. He will continue  on low-dose aspirin daily. He will follow-up with his primary care provider and all other specialties as indicated. He will call office with any questions/concerns.     Problem List Items Addressed This Visit             ICD-10-CM    Erythrocytosis - Primary D75.1    Relevant Orders    Ferritin    Iron and TIBC     Rosanne M Casal, APRN-CNP, DNP

## 2024-10-07 NOTE — PROGRESS NOTES
Pt here for phleb, hematocrit 51.2, met parameter for phleb. IV occluded at 250cc. Pt requested not to be accessed again if ok with APRN. APRN R. Casal notified, ok to stop at 250cc removal. He is aware of plan of care, will call with further questions or concerns. Will return in 6 months for next phleb.

## 2024-10-07 NOTE — PROGRESS NOTES
Clinical Pharmacy Appointment    Patient ID: Morro Seo is a 59 y.o. male who presents for Diabetes.    Pt is here for Follow Up.     Referring Provider: Lisseth Thomas DO  PCP: Lisseth Thomas DO   Last visit with PCP: 8/8/2024   Next visit with PCP: 11/12/2024    Cardiology: Dr. Núñez     Subjective     Interval History  Has continued to use CGM.   Scheduled a heart cath procedure for 10/9    HPI  Type II Diabetes  Current  Pharmacotherapy:   Acarbose 50 mg TID  Metformin 1000 mg BID  Januvia 100 mg daily  Farxiga 10 mg daily     SECONDARY PREVENTION  - Statin? Atorvastatin 80 mg   LDL: 16   TC: 76  - ACE-I/ARB? No   UACR: 55.6 (4/26/2021)   BP: 114/60  - Aspirin? Yes    -The ASCVD Risk score (Belem GARCIA, et al., 2019) failed to calculate for the following reasons:    The patient has a prior MI or stroke diagnosis      Current monitoring regimen:   Patient is using: CGM (FreeStyle Aliza)     SMBG Fasting Readings: elevated 200-300 range consistently    Pertinent PMH Review:  - PMH of Pancreatitis: n/a  - PMH/FH of Medullary Thyroid Cancer: n/a  - PMH of Retinopathy: n/a       Drug Interactions  No relevant drug interactions were noted.        Objective   Allergies   Allergen Reactions    Jardiance [Empagliflozin] Rash    Penicillins Unknown     Unknown reaction as child    Sulfamethoxazole-Trimethoprim Rash     Social History     Social History Narrative    Not on file      Medication Review  Current Outpatient Medications   Medication Instructions    acarbose (Precose) 50 mg tablet TAKE 1 TABLET BY MOUTH 3 TIMES DAILY (MORNING, MIDDAY, LATE AFTERNOON).    albuterol 90 mcg/actuation inhaler 2 puffs, inhalation, Every 4 hours PRN    ammonium lactate (Lac-Hydrin) 12 % lotion USE 1 APPLICATION TO THE AFFECTED AREA EVERY NIGHT    aspirin 325 mg, oral, Daily    atorvastatin (LIPITOR) 80 mg, oral, Daily    budesonide-formoteroL (Symbicort) 160-4.5 mcg/actuation inhaler 2 puffs, inhalation, 2 times daily  RT, Rinse mouth with water after use to reduce aftertaste and incidence of candidiasis. Do not swallow.    buPROPion SR (WELLBUTRIN SR) 150 mg, oral, 2 times daily, Do not crush, chew, or split.    dapagliflozin propanediol (FARXIGA) 10 mg, oral, Daily    FreeStyle Aliza 3 Sensor device Use for continuous glucose monitoring. Change every 14 days.    gabapentin (NEURONTIN) 100 mg, oral, 2 times daily    ipratropium-albuteroL (Duo-Neb) 0.5-2.5 mg/3 mL nebulizer solution 3 mL, nebulization, Every 4 hours PRN    metFORMIN (GLUCOPHAGE) 1,000 mg, oral, 2 times daily (morning and late afternoon)    omeprazole (PRILOSEC) 40 mg, oral, Daily before breakfast, Do not crush or chew.    potassium chloride CR 10 mEq ER tablet 10 mEq, oral, Daily    SITagliptin phosphate (JANUVIA) 100 mg, oral, Daily    spironolactone (ALDACTONE) 25 mg, oral, Daily    tiotropium (Spiriva Respimat) 2.5 mcg/actuation inhaler 2 puffs, inhalation, Daily    torsemide (DEMADEX) 20 mg, oral, Daily, as directed      Vitals  BP Readings from Last 2 Encounters:   09/20/24 114/65   09/12/24 122/70     BMI Readings from Last 1 Encounters:   09/20/24 43.39 kg/m²      Labs  A1C  Lab Results   Component Value Date    HGBA1C 9.0 08/21/2024    HGBA1C 10.5 (H) 07/03/2024    HGBA1C 8.6 (H) 02/19/2024     BMP  Lab Results   Component Value Date    CALCIUM 9.3 10/01/2024     10/01/2024    K 4.5 10/01/2024    CO2 31 10/01/2024    CL 99 10/01/2024    BUN 13 10/01/2024    CREATININE 0.72 10/01/2024    EGFR >90 10/01/2024     LFTs  Lab Results   Component Value Date    ALT 23 07/03/2024    AST 17 07/03/2024    ALKPHOS 106 07/03/2024    BILITOT 1.5 (H) 07/03/2024     FLP  Lab Results   Component Value Date    TRIG 148 11/15/2023    CHOL 76 11/15/2023    LDLF 30 03/28/2022    LDLCALC 16 11/15/2023    HDL 30.5 11/15/2023     Urine Microalbumin  Lab Results   Component Value Date    MICROALBCREA 37.2 (H) 04/26/2021     Weight Management  Wt Readings from Last 3  Encounters:   09/20/24 133 kg (293 lb 12.8 oz)   09/12/24 132 kg (291 lb 6.4 oz)   08/22/24 135 kg (297 lb)      There is no height or weight on file to calculate BMI.     Patient Discussion:  Dr. Thomas took him off the celecoxib. Stopped for GI bleed  Pain is worsening significantly. Hard to accomplish ADLs.   Tylenol and Aleve with no relief.   Dr. Thomas would like to wait until we get results of the EGD before restarting.     Assessment/Plan   Problem List Items Addressed This Visit       Type 2 diabetes mellitus, without long-term current use of insulin (Multi)     Patient's diabetes is currenty uncontrolled, as shown by A1c of 9% (goal < 7%).     Patient has continued to wear his CGM. He is without a monitor for now due to scheduled heart cath procedure. He is also off his metformin until after the procedure.     Sugars have been running consistently high at home. There are many different avenues to take from here. Will plan to discuss following his admission.     Plan:  CONTINUE Farxiga, acarbose, metformin and Januvia              Clinical Pharmacist follow-up: 10/21 @ 12 PM, Telehealth visit    Continue all meds under the continuation of care with the referring provider and clinical pharmacy team.    Thank you,  Ricarda Wong, PharmD  Clinical Pharmacy Specialist     Verbal consent to manage patient's drug therapy was obtained from the patient. They were informed they may decline to participate or withdraw from participation in pharmacy services at any time.Fitting CGM

## 2024-10-07 NOTE — ASSESSMENT & PLAN NOTE
Patient's diabetes is currenty uncontrolled, as shown by A1c of 9% (goal < 7%).     Patient has continued to wear his CGM. He is without a monitor for now due to scheduled heart cath procedure. He is also off his metformin until after the procedure.     Sugars have been running consistently high at home. There are many different avenues to take from here. Will plan to discuss following his admission.     Plan:  CONTINUE Farxiga, acarbose, metformin and Januvia

## 2024-10-07 NOTE — PATIENT INSTRUCTIONS
Office visit with Dariana today for Polycythemia    Phlebotomy today  Continue phlebo every 6mons    Follow up in 6months with Dariana

## 2024-10-17 ENCOUNTER — TELEPHONE (OUTPATIENT)
Dept: CARDIOLOGY | Facility: HOSPITAL | Age: 59
End: 2024-10-17
Payer: COMMERCIAL

## 2024-10-17 NOTE — TELEPHONE ENCOUNTER
----- Message from Ivana MCMAHON sent at 10/16/2024  9:47 AM EDT -----  Regarding: [facility]  Patient is scheduled for cath on: 10/24/24  Arrival: 8:30 am  Please call pt with instructions. Thank you.

## 2024-10-18 NOTE — TELEPHONE ENCOUNTER
Was able to reach patient today.  He is aware of date and time of arrival.  NPO after midnight.  Can take is asa 81 mg that am.  Do not take the metformin two days prior, the day of or two days after.  Confirmed allergies and is not allergic to shellfish or IV dye.  Someone needs to be available to drive you home after, but pack a bag in case you have to spend the night.  Has current labs that are acceptable for the procedure.

## 2024-10-21 ENCOUNTER — APPOINTMENT (OUTPATIENT)
Dept: PHARMACY | Facility: HOSPITAL | Age: 59
End: 2024-10-21
Payer: COMMERCIAL

## 2024-10-24 ENCOUNTER — HOSPITAL ENCOUNTER (OUTPATIENT)
Facility: HOSPITAL | Age: 59
Setting detail: OUTPATIENT SURGERY
Discharge: HOME | End: 2024-10-24
Attending: INTERNAL MEDICINE | Admitting: INTERNAL MEDICINE
Payer: COMMERCIAL

## 2024-10-24 VITALS
OXYGEN SATURATION: 99 % | HEART RATE: 72 BPM | BODY MASS INDEX: 43.1 KG/M2 | TEMPERATURE: 97.5 F | DIASTOLIC BLOOD PRESSURE: 70 MMHG | RESPIRATION RATE: 17 BRPM | SYSTOLIC BLOOD PRESSURE: 134 MMHG | WEIGHT: 291.01 LBS | HEIGHT: 69 IN

## 2024-10-24 DIAGNOSIS — I10 ESSENTIAL HYPERTENSION: ICD-10-CM

## 2024-10-24 DIAGNOSIS — R93.1 AGATSTON CORONARY ARTERY CALCIUM SCORE GREATER THAN 400: ICD-10-CM

## 2024-10-24 DIAGNOSIS — E78.5 HYPERLIPIDEMIA, UNSPECIFIED HYPERLIPIDEMIA TYPE: Primary | ICD-10-CM

## 2024-10-24 DIAGNOSIS — R94.39: ICD-10-CM

## 2024-10-24 DIAGNOSIS — R94.30 ABNORMAL RESULT OF CARDIOVASCULAR FUNCTION STUDY, UNSPECIFIED: ICD-10-CM

## 2024-10-24 LAB — ACT BLD: 190 SEC (ref 83–199)

## 2024-10-24 PROCEDURE — C1894 INTRO/SHEATH, NON-LASER: HCPCS | Performed by: INTERNAL MEDICINE

## 2024-10-24 PROCEDURE — 99152 MOD SED SAME PHYS/QHP 5/>YRS: CPT | Performed by: INTERNAL MEDICINE

## 2024-10-24 PROCEDURE — 85347 COAGULATION TIME ACTIVATED: CPT

## 2024-10-24 PROCEDURE — 99153 MOD SED SAME PHYS/QHP EA: CPT | Performed by: INTERNAL MEDICINE

## 2024-10-24 PROCEDURE — 2500000004 HC RX 250 GENERAL PHARMACY W/ HCPCS (ALT 636 FOR OP/ED): Performed by: INTERNAL MEDICINE

## 2024-10-24 PROCEDURE — 93458 L HRT ARTERY/VENTRICLE ANGIO: CPT | Performed by: INTERNAL MEDICINE

## 2024-10-24 PROCEDURE — C1760 CLOSURE DEV, VASC: HCPCS | Performed by: INTERNAL MEDICINE

## 2024-10-24 PROCEDURE — C1887 CATHETER, GUIDING: HCPCS | Performed by: INTERNAL MEDICINE

## 2024-10-24 PROCEDURE — 2720000007 HC OR 272 NO HCPCS: Performed by: INTERNAL MEDICINE

## 2024-10-24 PROCEDURE — 2550000001 HC RX 255 CONTRASTS: Performed by: INTERNAL MEDICINE

## 2024-10-24 PROCEDURE — 96373 THER/PROPH/DIAG INJ IA: CPT | Performed by: INTERNAL MEDICINE

## 2024-10-24 PROCEDURE — 7100000010 HC PHASE TWO TIME - EACH INCREMENTAL 1 MINUTE: Performed by: INTERNAL MEDICINE

## 2024-10-24 PROCEDURE — 7100000009 HC PHASE TWO TIME - INITIAL BASE CHARGE: Performed by: INTERNAL MEDICINE

## 2024-10-24 RX ORDER — MORPHINE SULFATE 2 MG/ML
2 INJECTION, SOLUTION INTRAMUSCULAR; INTRAVENOUS EVERY 6 HOURS PRN
Status: CANCELLED | OUTPATIENT
Start: 2024-10-24

## 2024-10-24 RX ORDER — FENTANYL CITRATE 50 UG/ML
INJECTION, SOLUTION INTRAMUSCULAR; INTRAVENOUS AS NEEDED
Status: DISCONTINUED | OUTPATIENT
Start: 2024-10-24 | End: 2024-10-24 | Stop reason: HOSPADM

## 2024-10-24 RX ORDER — LIDOCAINE HYDROCHLORIDE 20 MG/ML
INJECTION, SOLUTION INFILTRATION; PERINEURAL AS NEEDED
Status: DISCONTINUED | OUTPATIENT
Start: 2024-10-24 | End: 2024-10-24 | Stop reason: HOSPADM

## 2024-10-24 RX ORDER — WATER
240 LIQUID (ML) MISCELLANEOUS
Status: CANCELLED | OUTPATIENT
Start: 2024-10-24 | End: 2024-10-24

## 2024-10-24 RX ORDER — ACETAMINOPHEN 650 MG/1
650 SUPPOSITORY RECTAL EVERY 6 HOURS PRN
Status: CANCELLED | OUTPATIENT
Start: 2024-10-24

## 2024-10-24 RX ORDER — MIDAZOLAM HYDROCHLORIDE 1 MG/ML
INJECTION, SOLUTION INTRAMUSCULAR; INTRAVENOUS AS NEEDED
Status: DISCONTINUED | OUTPATIENT
Start: 2024-10-24 | End: 2024-10-24 | Stop reason: HOSPADM

## 2024-10-24 RX ORDER — HEPARIN SODIUM 1000 [USP'U]/ML
INJECTION, SOLUTION INTRAVENOUS; SUBCUTANEOUS AS NEEDED
Status: DISCONTINUED | OUTPATIENT
Start: 2024-10-24 | End: 2024-10-24 | Stop reason: HOSPADM

## 2024-10-24 RX ORDER — ACETAMINOPHEN 325 MG/1
650 TABLET ORAL EVERY 6 HOURS PRN
Status: CANCELLED | OUTPATIENT
Start: 2024-10-24

## 2024-10-24 RX ORDER — ACETAMINOPHEN 160 MG/5ML
650 SOLUTION ORAL EVERY 6 HOURS PRN
Status: CANCELLED | OUTPATIENT
Start: 2024-10-24

## 2024-10-24 ASSESSMENT — PAIN SCALES - GENERAL
PAINLEVEL_OUTOF10: 0 - NO PAIN

## 2024-10-24 ASSESSMENT — PAIN - FUNCTIONAL ASSESSMENT: PAIN_FUNCTIONAL_ASSESSMENT: 0-10

## 2024-11-01 ENCOUNTER — HOSPITAL ENCOUNTER (OUTPATIENT)
Dept: CARDIOLOGY | Facility: HOSPITAL | Age: 59
Discharge: HOME | End: 2024-11-01
Payer: COMMERCIAL

## 2024-11-01 ENCOUNTER — APPOINTMENT (OUTPATIENT)
Dept: CARDIOLOGY | Facility: HOSPITAL | Age: 59
End: 2024-11-01
Payer: COMMERCIAL

## 2024-11-01 DIAGNOSIS — F17.200 MODERATE TOBACCO USE DISORDER: ICD-10-CM

## 2024-11-01 DIAGNOSIS — I50.32 CHRONIC DIASTOLIC (CONGESTIVE) HEART FAILURE: ICD-10-CM

## 2024-11-01 DIAGNOSIS — G47.33 OBSTRUCTIVE SLEEP APNEA: ICD-10-CM

## 2024-11-01 DIAGNOSIS — I10 ESSENTIAL HYPERTENSION: ICD-10-CM

## 2024-11-01 DIAGNOSIS — I51.7 LEFT VENTRICULAR HYPERTROPHY: ICD-10-CM

## 2024-11-01 LAB
AORTIC VALVE MEAN GRADIENT: 6 MMHG
AORTIC VALVE PEAK VELOCITY: 1.74 M/S
AV PEAK GRADIENT: 12 MMHG
AVA (PEAK VEL): 1.83 CM2
AVA (VTI): 1.96 CM2
EJECTION FRACTION APICAL 4 CHAMBER: 51.8
EJECTION FRACTION: 48 %
LEFT ATRIUM VOLUME AREA LENGTH INDEX BSA: 27.4 ML/M2
LEFT VENTRICLE INTERNAL DIMENSION DIASTOLE: 5.86 CM (ref 3.5–6)
LEFT VENTRICULAR OUTFLOW TRACT DIAMETER: 1.91 CM
LV EJECTION FRACTION BIPLANE: 50 %
MITRAL VALVE E/A RATIO: 1.01
MITRAL VALVE E/E' RATIO: 13.89
RIGHT VENTRICLE FREE WALL PEAK S': 18.24 CM/S
TRICUSPID ANNULAR PLANE SYSTOLIC EXCURSION: 2.9 CM

## 2024-11-01 PROCEDURE — 2500000004 HC RX 250 GENERAL PHARMACY W/ HCPCS (ALT 636 FOR OP/ED): Performed by: INTERNAL MEDICINE

## 2024-11-01 PROCEDURE — 93306 TTE W/DOPPLER COMPLETE: CPT

## 2024-11-01 PROCEDURE — 93306 TTE W/DOPPLER COMPLETE: CPT | Performed by: STUDENT IN AN ORGANIZED HEALTH CARE EDUCATION/TRAINING PROGRAM

## 2024-11-01 ASSESSMENT — ENCOUNTER SYMPTOMS
PALPITATIONS: 0
DYSPNEA ON EXERTION: 1
SYNCOPE: 0
ENDOCRINE NEGATIVE: 1
DIZZINESS: 0
ORTHOPNEA: 0
LIGHT-HEADEDNESS: 0
GASTROINTESTINAL NEGATIVE: 1
SHORTNESS OF BREATH: 0
BRUISES/BLEEDS EASILY: 1
BACK PAIN: 1
EYES NEGATIVE: 1
STIFFNESS: 1
NEAR-SYNCOPE: 0

## 2024-11-01 NOTE — PROGRESS NOTES
"Primary Cardiologist: Dr. Núñez    Chief Complaint:   No chief complaint on file.     History Of Present Illness:    Morro Seo is a 59 y.o. male with past medical history of second-degree AV block and complete heart block now s/p PPM, chronic diastolic heart failure, hypertension, dyslipidemia, type 2 diabetes mellitus, polycythemia, sleep apnea on CPAP, smoking and obesity who presents today for follow up after [facility].    Today, Morro Seo is feeling well, continues to have chronic shortness of breath that is stable, no chest pain or pressure, no dizziness or lightheadedness. His lower extremity edema is stable, no orthopnea or PND.    Last Recorded Vitals:  Visit Vitals  /76   Pulse 78   Ht 1.753 m (5' 9.02\")   Wt 135 kg (297 lb 6.4 oz)   BMI 43.89 kg/m²   Smoking Status Former   BSA 2.56 m²        Past Medical History:  He has a past medical history of Complete heart block (10/31/2023), Diverticulosis of intestine, part unspecified, without perforation or abscess without bleeding (01/30/2020), Personal history of other endocrine, nutritional and metabolic disease (01/30/2020), Personal history of other endocrine, nutritional and metabolic disease (01/30/2020), Personal history of other specified conditions (05/04/2018), Personal history of pneumonia (recurrent) (05/08/2018), Solitary pulmonary nodule (05/04/2018), and Unspecified open wound of unspecified toe(s) without damage to nail, sequela (08/11/2020).    Past Surgical History:  He has a past surgical history that includes Other surgical history (04/28/2020); Other surgical history (04/28/2020); Other surgical history (04/28/2020); Other surgical history (04/28/2020); Other surgical history (07/23/2020); and Cardiac catheterization (N/A, 10/24/2024).      Social History:  He reports that he has quit smoking. His smoking use included cigarettes. He has never used smokeless tobacco. He reports that he does not currently use alcohol. He " reports that he does not use drugs.    Family History:  Family History   Problem Relation Name Age of Onset    Heart attack Mother      Hypertension Mother      Coronary artery disease Father      Heart attack Father      Hypertension Father      Diabetes Sister      Diabetes Brother      Lung cancer Maternal Grandfather      Lung cancer Paternal Grandfather          Allergies:  Jardiance [empagliflozin], Penicillins, and Sulfamethoxazole-trimethoprim    Outpatient Medications:  Current Outpatient Medications   Medication Instructions    acarbose (Precose) 50 mg tablet TAKE 1 TABLET BY MOUTH 3 TIMES DAILY (MORNING, MIDDAY, LATE AFTERNOON).    albuterol 90 mcg/actuation inhaler 2 puffs, inhalation, Every 4 hours PRN    ammonium lactate (Lac-Hydrin) 12 % lotion USE 1 APPLICATION TO THE AFFECTED AREA EVERY NIGHT    aspirin 325 mg, Daily    atorvastatin (LIPITOR) 80 mg, oral, Daily    budesonide-formoteroL (Symbicort) 160-4.5 mcg/actuation inhaler 2 puffs, inhalation, 2 times daily RT, Rinse mouth with water after use to reduce aftertaste and incidence of candidiasis. Do not swallow.    buPROPion SR (WELLBUTRIN SR) 150 mg, oral, 2 times daily, Do not crush, chew, or split.    dapagliflozin propanediol (FARXIGA) 10 mg, oral, Daily    FreeStyle Aliza 3 Sensor device Use for continuous glucose monitoring. Change every 14 days.    gabapentin (NEURONTIN) 100 mg, oral, 2 times daily    ipratropium-albuteroL (Duo-Neb) 0.5-2.5 mg/3 mL nebulizer solution 3 mL, nebulization, Every 4 hours PRN    metFORMIN (GLUCOPHAGE) 1,000 mg, oral, 2 times daily (morning and late afternoon)    omeprazole (PRILOSEC) 40 mg, oral, Daily before breakfast, Do not crush or chew.    potassium chloride CR 10 mEq ER tablet 10 mEq, oral, Daily    SITagliptin phosphate (JANUVIA) 100 mg, oral, Daily    spironolactone (ALDACTONE) 25 mg, oral, Daily    tiotropium (Spiriva Respimat) 2.5 mcg/actuation inhaler 2 puffs, inhalation, Daily    torsemide (DEMADEX)  20 mg, oral, Daily, as directed         Review of Systems   Constitutional: Positive for malaise/fatigue (fatigues easliy).   HENT: Negative.     Eyes: Negative.    Cardiovascular:  Positive for dyspnea on exertion (stable). Negative for chest pain, leg swelling (improved), near-syncope, orthopnea, palpitations and syncope.   Respiratory:  Negative for shortness of breath.    Endocrine: Negative.    Hematologic/Lymphatic: Bruises/bleeds easily (easy bruising).   Skin: Negative.    Musculoskeletal:  Positive for arthritis, back pain, joint pain (bilateral hips/knees) and stiffness.   Gastrointestinal: Negative.    Genitourinary: Negative.    Neurological:  Negative for dizziness and light-headedness.        Physical Exam  Vitals reviewed.   Constitutional:       Appearance: Normal appearance.   HENT:      Head: Normocephalic.      Mouth/Throat:      Mouth: Mucous membranes are moist.   Cardiovascular:      Rate and Rhythm: Normal rate and regular rhythm.      Pulses: Normal pulses.      Heart sounds: Normal heart sounds.   Pulmonary:      Effort: Pulmonary effort is normal.      Breath sounds: Normal breath sounds. No wheezing, rhonchi or rales.   Abdominal:      General: Bowel sounds are normal. There is no distension.      Palpations: Abdomen is soft.      Tenderness: There is no abdominal tenderness.   Musculoskeletal:      Cervical back: Normal range of motion.      Right lower leg: Edema (trace) present.      Left lower leg: Edema (trace) present.   Skin:     General: Skin is warm and dry.      Comments: BLE coloring of venous stasis   Neurological:      General: No focal deficit present.      Mental Status: He is alert and oriented to person, place, and time.   Psychiatric:         Mood and Affect: Mood normal.         Behavior: Behavior normal.           Last Labs:  CBC -  Lab Results   Component Value Date    WBC 7.6 10/01/2024    HGB 16.8 10/01/2024    HCT 51.2 10/01/2024    MCV 93 10/01/2024      10/01/2024       CMP -  Lab Results   Component Value Date    CALCIUM 9.3 10/01/2024    PROT 6.6 07/03/2024    ALBUMIN 4.4 07/03/2024    AST 17 07/03/2024    ALT 23 07/03/2024    ALKPHOS 106 07/03/2024    BILITOT 1.5 (H) 07/03/2024       LIPID PANEL -   Lab Results   Component Value Date    CHOL 76 11/15/2023    TRIG 148 11/15/2023    HDL 30.5 11/15/2023    CHHDL 2.5 11/15/2023    LDLF 30 03/28/2022    VLDL 30 11/15/2023    NHDL 46 11/15/2023       RENAL FUNCTION PANEL -   Lab Results   Component Value Date    GLUCOSE 252 (H) 10/01/2024     10/01/2024    K 4.5 10/01/2024    CL 99 10/01/2024    CO2 31 10/01/2024    ANIONGAP 12 10/01/2024    BUN 13 10/01/2024    CREATININE 0.72 10/01/2024    GFRMALE >90 09/18/2023    CALCIUM 9.3 10/01/2024    ALBUMIN 4.4 07/03/2024        Lab Results   Component Value Date    BNP 11 08/04/2023    HGBA1C 9.0 08/21/2024       Last Cardiology Tests:  ECG:      Echo:  TTE 11/1/24:   1. Poorly visualized anatomical structures due to suboptimal image quality.   2. The left ventricular systolic function is mildly decreased, with a visually estimated ejection fraction of 45-50%.   3. Entire apex is abnormal.   4. Left ventricular diastolic filling was indeterminate.   5. There is normal right ventricular global systolic function.   6. Mildly enlarged right ventricle.   7. There is severe mitral annular calcification.   8. There is moderate thickening and calcification of the posterior mitral valve leaflet.   9. There is moderate aortic valve cusp calcification.  10. Aortic valve sclerosis.      TTE 5/18/23: Left ventricular systolic function is normal with a 60-65% estimated ejection fraction.  2. Spectral Doppler shows a pseudonormal pattern of left ventricular diastolic filling.  3. There is moderate concentric left ventricular hypertrophy.  4. Moderately enlarged right ventricle.     TTE 1/31/22: The left ventricular systolic function is normal with a 60-65% estimated ejection  fraction.  2. Moderately enlarged right ventricle.  3. The left atrium is moderately dilated.  4. Aortic valve sclerosis.   No results found for this or any previous visit from the past 1095 days.      Ejection Fractions:  EF   Date/Time Value Ref Range Status   11/01/2024 09:40 AM 48 %        Cath:  [facility] 10/29/24:   Mild to moderate CAD as described.   2. No significant LV-AO peak to peak pullback gradient.   3. Left Ventricular end-diastolic pressure = 19.  Stress Test:  Dobutamine exercise stress echo 9/9/24  Summary:   1. Abnormal Stress Test.   2. Akinetic apical segments at peak stress.   3. At peak, there are stress-induced regional wall motion abnormalities.   4. Submaximal level of stress achieved.      Nuclear Stress test 1/13/20:1. Normal stress myocardial perfusion imaging in response to  pharmacologic stress.  2. Well-maintained left ventricular function.       Cardiac Imaging:  No results found for this or any previous visit from the past 1095 days.        Lab review: I have personally reviewed the laboratory result(s)     Assessment/Plan     Morro Seo is a 58 y.o. male with past medical history of second-degree AV block and complete heart block now s/p PPM, chronic diastolic heart failure, hypertension, dyslipidemia, type 2 diabetes mellitus, polycythemia, sleep apnea on CPAP, smoking and obesity who presents today for follow up.     Severe coronary artery calcifications/Abnormal stress test/mild to moderate CAD  As seen on CT chest with elevated Ca score of 1719  Dobutamine exercise stress echo 9/9/24: Abnormal Stress Test. Akinetic apical segments at peak stress.At peak, there are stress-induced regional wall motion abnormalities. Submaximal level of stress achieved.  [facility] 10/29/24: Mild to moderate CAD as described.  LDL is at goal  Blood pressure is well controlled  Continue on ASA, high-intensity statin    HFmrEF  TTE 11/1/24: Poorly visualized anatomical structures due to suboptimal image  quality. LVEF 45-50%  TTE 5/18/23: Left ventricular systolic function is normal with a 60-65% estimated ejection fraction.Spectral Doppler shows a pseudonormal pattern of left ventricular diastolic filling.There is moderate concentric left ventricular hypertrophy. Moderately enlarged right ventricle.  Chest x-ray 5/12/23: Mild pulmonary edema. No consolidation seen  BNP 5/10/23: 26  Today, patient has stable lower extremity edema, non pitting  Continue on torsemide, spironolactone   Will add losartan today at 25 mg daily. Patient would benefit from Entresto but will hold off for now as previously did not tolerate lisinopril (unknown reason) in past. Repeat labs ordered in 2 weeks to monitor K and BMP.  Recent BMP with stable kidney function: 10/1/24 BUN/Cr 13/0.72    Complete heart block  s/p PPM  Follows with device clinic     Hypertensive heart disease  TTE with mild-mod concentric LVH  Today BP in office 124/76  Taken off lisinopril per PCP ?  Will trial losartan today for HFmrEF    Nonhealing lower extremity ulcer  Resolved, healed  ANNA was normal.  Following with Podiatry    Type 2 DM  Management per PCP  HgbA1c 10.5% 7/3/24  Now wearing Dexcom     Tobacco use  Quit smoking recently, congratulations given on his effort.     Bre Sosa, CRUZITO-CNP

## 2024-11-06 ENCOUNTER — OFFICE VISIT (OUTPATIENT)
Dept: CARDIOLOGY | Facility: HOSPITAL | Age: 59
End: 2024-11-06
Payer: COMMERCIAL

## 2024-11-06 VITALS
HEART RATE: 78 BPM | WEIGHT: 297.4 LBS | HEIGHT: 69 IN | SYSTOLIC BLOOD PRESSURE: 124 MMHG | DIASTOLIC BLOOD PRESSURE: 76 MMHG | BODY MASS INDEX: 44.05 KG/M2

## 2024-11-06 DIAGNOSIS — I10 ESSENTIAL HYPERTENSION: Primary | ICD-10-CM

## 2024-11-06 DIAGNOSIS — I51.9 LV DYSFUNCTION: ICD-10-CM

## 2024-11-06 DIAGNOSIS — Z95.0 STATUS CARDIAC PACEMAKER: ICD-10-CM

## 2024-11-06 DIAGNOSIS — I50.32 CHRONIC DIASTOLIC (CONGESTIVE) HEART FAILURE: ICD-10-CM

## 2024-11-06 DIAGNOSIS — I51.7 LEFT VENTRICULAR HYPERTROPHY: ICD-10-CM

## 2024-11-06 DIAGNOSIS — R93.1 AGATSTON CORONARY ARTERY CALCIUM SCORE GREATER THAN 400: ICD-10-CM

## 2024-11-06 DIAGNOSIS — R94.39: ICD-10-CM

## 2024-11-06 DIAGNOSIS — E78.5 HYPERLIPIDEMIA, UNSPECIFIED HYPERLIPIDEMIA TYPE: ICD-10-CM

## 2024-11-06 PROCEDURE — 99214 OFFICE O/P EST MOD 30 MIN: CPT | Performed by: CLINICAL NURSE SPECIALIST

## 2024-11-06 PROCEDURE — 3052F HG A1C>EQUAL 8.0%<EQUAL 9.0%: CPT | Performed by: CLINICAL NURSE SPECIALIST

## 2024-11-06 PROCEDURE — 4010F ACE/ARB THERAPY RXD/TAKEN: CPT | Performed by: CLINICAL NURSE SPECIALIST

## 2024-11-06 PROCEDURE — 3074F SYST BP LT 130 MM HG: CPT | Performed by: CLINICAL NURSE SPECIALIST

## 2024-11-06 PROCEDURE — 4004F PT TOBACCO SCREEN RCVD TLK: CPT | Performed by: CLINICAL NURSE SPECIALIST

## 2024-11-06 PROCEDURE — 3008F BODY MASS INDEX DOCD: CPT | Performed by: CLINICAL NURSE SPECIALIST

## 2024-11-06 PROCEDURE — 3078F DIAST BP <80 MM HG: CPT | Performed by: CLINICAL NURSE SPECIALIST

## 2024-11-06 RX ORDER — LOSARTAN POTASSIUM 25 MG/1
25 TABLET ORAL DAILY
Qty: 30 TABLET | Refills: 11 | Status: SHIPPED | OUTPATIENT
Start: 2024-11-06 | End: 2025-11-06

## 2024-11-06 NOTE — PATIENT INSTRUCTIONS
A new prescription has been sent in for losartan 25 mg daily.   Repeat labs have been ordered for you to complete in 2-3 weeks to monitor your kidney function and potassium.  Call our office with any new cardiac concerns  Continue current medications  Continue heart-healthy diet. A diet low in sodium, low in cholesterol, limiting red meats and eating whole foods.   Follow up with Dr. Núñez  in  3  months

## 2024-11-11 ENCOUNTER — APPOINTMENT (OUTPATIENT)
Dept: PHARMACY | Facility: HOSPITAL | Age: 59
End: 2024-11-11
Payer: COMMERCIAL

## 2024-11-11 DIAGNOSIS — E11.65 TYPE 2 DIABETES MELLITUS WITH HYPERGLYCEMIA, WITHOUT LONG-TERM CURRENT USE OF INSULIN: ICD-10-CM

## 2024-11-11 NOTE — ASSESSMENT & PLAN NOTE
Patient's diabetes is currenty uncontrolled, as shown by A1c of 9% (goal < 7%).     Patient has continued to wear his CGM and doing well s/p heart cath. He does state that he has not watched his sugars as much because his wife fell and broke her hip.     We discuss that we can continue to watch diet but we are most likely going to need an additional agent to assist with getting sugars under control, notably GLP-1 vs basal insulin.     Plan:  CONTINUE Farxiga, acarbose, metformin and Januvia

## 2024-11-11 NOTE — PROGRESS NOTES
Clinical Pharmacy Appointment    Patient ID: Morro Seo is a 59 y.o. male who presents for Diabetes.    Pt is here for Follow Up.     Referring Provider: Lisseth Thomas DO  PCP: Lisseth Thomas DO   Last visit with PCP: 8/8/2024   Next visit with PCP: 11/12/2024    Cardiology: Dr. Núñez     Subjective     Interval History  Has continued to use CGM.   Scheduled a heart cath procedure for 10/9    HPI  Type II Diabetes  Current  Pharmacotherapy:   Acarbose 50 mg TID  Metformin 1000 mg BID  Januvia 100 mg daily  Farxiga 10 mg daily     SECONDARY PREVENTION  - Statin? Atorvastatin 80 mg   LDL: 16   TC: 76  - ACE-I/ARB? No   UACR: 55.6 (4/26/2021)   BP: 114/60  - Aspirin? Yes    -The ASCVD Risk score (Belem GARCIA, et al., 2019) failed to calculate for the following reasons:    Risk score cannot be calculated because patient has a medical history suggesting prior/existing ASCVD      Current monitoring regimen:   Patient is using: CGM (FreeStyle Aliza)     SMBG Fasting Readings: 200-300 consistently       Pertinent PMH Review:  - PMH of Pancreatitis: n/a  - PMH/FH of Medullary Thyroid Cancer: n/a  - PMH of Retinopathy: n/a       Drug Interactions  No relevant drug interactions were noted.        Objective   Allergies   Allergen Reactions    Jardiance [Empagliflozin] Rash    Penicillins Unknown     Unknown reaction as child    Sulfamethoxazole-Trimethoprim Rash     Social History     Social History Narrative    Not on file      Medication Review  Current Outpatient Medications   Medication Instructions    acarbose (Precose) 50 mg tablet TAKE 1 TABLET BY MOUTH 3 TIMES DAILY (MORNING, MIDDAY, LATE AFTERNOON).    albuterol 90 mcg/actuation inhaler 2 puffs, inhalation, Every 4 hours PRN    ammonium lactate (Lac-Hydrin) 12 % lotion USE 1 APPLICATION TO THE AFFECTED AREA EVERY NIGHT    aspirin 325 mg, Daily    atorvastatin (LIPITOR) 80 mg, oral, Daily    budesonide-formoteroL (Symbicort) 160-4.5 mcg/actuation inhaler  2 puffs, inhalation, 2 times daily RT, Rinse mouth with water after use to reduce aftertaste and incidence of candidiasis. Do not swallow.    buPROPion SR (WELLBUTRIN SR) 150 mg, oral, 2 times daily, Do not crush, chew, or split.    dapagliflozin propanediol (FARXIGA) 10 mg, oral, Daily    FreeStyle Aliza 3 Sensor device Use for continuous glucose monitoring. Change every 14 days.    gabapentin (NEURONTIN) 100 mg, oral, 2 times daily    ipratropium-albuteroL (Duo-Neb) 0.5-2.5 mg/3 mL nebulizer solution 3 mL, nebulization, Every 4 hours PRN    losartan (COZAAR) 25 mg, oral, Daily    metFORMIN (GLUCOPHAGE) 1,000 mg, oral, 2 times daily (morning and late afternoon)    omeprazole (PRILOSEC) 40 mg, oral, Daily before breakfast, Do not crush or chew.    potassium chloride CR 10 mEq ER tablet 10 mEq, oral, Daily    SITagliptin phosphate (JANUVIA) 100 mg, oral, Daily    spironolactone (ALDACTONE) 25 mg, oral, Daily    tiotropium (Spiriva Respimat) 2.5 mcg/actuation inhaler 2 puffs, inhalation, Daily    torsemide (DEMADEX) 20 mg, oral, Daily, as directed      Vitals  BP Readings from Last 2 Encounters:   11/06/24 124/76   10/24/24 134/70     BMI Readings from Last 1 Encounters:   11/06/24 43.89 kg/m²      Labs  A1C  Lab Results   Component Value Date    HGBA1C 9.0 08/21/2024    HGBA1C 10.5 (H) 07/03/2024    HGBA1C 8.6 (H) 02/19/2024     BMP  Lab Results   Component Value Date    CALCIUM 9.3 10/01/2024     10/01/2024    K 4.5 10/01/2024    CO2 31 10/01/2024    CL 99 10/01/2024    BUN 13 10/01/2024    CREATININE 0.72 10/01/2024    EGFR >90 10/01/2024     LFTs  Lab Results   Component Value Date    ALT 23 07/03/2024    AST 17 07/03/2024    ALKPHOS 106 07/03/2024    BILITOT 1.5 (H) 07/03/2024     FLP  Lab Results   Component Value Date    TRIG 148 11/15/2023    CHOL 76 11/15/2023    LDLF 30 03/28/2022    LDLCALC 16 11/15/2023    HDL 30.5 11/15/2023     Urine Microalbumin  Lab Results   Component Value Date    MICROALBCREA  37.2 (H) 04/26/2021     Weight Management  Wt Readings from Last 3 Encounters:   11/06/24 135 kg (297 lb 6.4 oz)   10/24/24 132 kg (291 lb 0.1 oz)   10/07/24 132 kg (291 lb 0.1 oz)      There is no height or weight on file to calculate BMI.     Patient Discussion:  Dr. Thomas took him off the celecoxib. Stopped for GI bleed  Pain is worsening significantly. Hard to accomplish ADLs.   Tylenol and Aleve with no relief.   Dr. Thomas would like to wait until we get results of the EGD before restarting.   Started on losartan by cardiology and has been doing well. No questions/concerns at this time   Reminded him to get labs done to monitor electrolytes.     Assessment/Plan   Problem List Items Addressed This Visit       Type 2 diabetes mellitus, without long-term current use of insulin (Multi)     Patient's diabetes is currenty uncontrolled, as shown by A1c of 9% (goal < 7%).     Patient has continued to wear his CGM and doing well s/p heart cath. He does state that he has not watched his sugars as much because his wife fell and broke her hip.     We discuss that we can continue to watch diet but we are most likely going to need an additional agent to assist with getting sugars under control, notably GLP-1 vs basal insulin.     Plan:  CONTINUE Farxiga, acarbose, metformin and Januvia         Relevant Orders    Referral to Clinical Pharmacy         Clinical Pharmacist follow-up: 12/2 @ 11:40 PM, Telehealth visit    Continue all meds under the continuation of care with the referring provider and clinical pharmacy team.    Thank you,  Ricarda Wong, PharmD  Clinical Pharmacy Specialist     Verbal consent to manage patient's drug therapy was obtained from the patient. They were informed they may decline to participate or withdraw from participation in pharmacy services at any time.Fitting CGM

## 2024-11-12 ENCOUNTER — APPOINTMENT (OUTPATIENT)
Dept: PRIMARY CARE | Facility: CLINIC | Age: 59
End: 2024-11-12
Payer: COMMERCIAL

## 2024-11-13 ENCOUNTER — APPOINTMENT (OUTPATIENT)
Dept: PRIMARY CARE | Facility: CLINIC | Age: 59
End: 2024-11-13
Payer: COMMERCIAL

## 2024-11-13 VITALS
BODY MASS INDEX: 43.39 KG/M2 | DIASTOLIC BLOOD PRESSURE: 74 MMHG | HEART RATE: 69 BPM | TEMPERATURE: 97.4 F | OXYGEN SATURATION: 95 % | WEIGHT: 294 LBS | SYSTOLIC BLOOD PRESSURE: 132 MMHG

## 2024-11-13 DIAGNOSIS — F33.0 MILD EPISODE OF RECURRENT MAJOR DEPRESSIVE DISORDER (CMS-HCC): ICD-10-CM

## 2024-11-13 DIAGNOSIS — E78.5 HYPERLIPIDEMIA, UNSPECIFIED HYPERLIPIDEMIA TYPE: ICD-10-CM

## 2024-11-13 DIAGNOSIS — E11.49 OTHER DIABETIC NEUROLOGICAL COMPLICATION ASSOCIATED WITH TYPE 2 DIABETES MELLITUS: ICD-10-CM

## 2024-11-13 DIAGNOSIS — I10 ESSENTIAL HYPERTENSION: Primary | ICD-10-CM

## 2024-11-13 DIAGNOSIS — E11.69 TYPE 2 DIABETES MELLITUS WITH OTHER SPECIFIED COMPLICATION, WITHOUT LONG-TERM CURRENT USE OF INSULIN: ICD-10-CM

## 2024-11-13 PROCEDURE — 3075F SYST BP GE 130 - 139MM HG: CPT | Performed by: FAMILY MEDICINE

## 2024-11-13 PROCEDURE — 4010F ACE/ARB THERAPY RXD/TAKEN: CPT | Performed by: FAMILY MEDICINE

## 2024-11-13 PROCEDURE — 99214 OFFICE O/P EST MOD 30 MIN: CPT | Performed by: FAMILY MEDICINE

## 2024-11-13 PROCEDURE — 4004F PT TOBACCO SCREEN RCVD TLK: CPT | Performed by: FAMILY MEDICINE

## 2024-11-13 PROCEDURE — 3052F HG A1C>EQUAL 8.0%<EQUAL 9.0%: CPT | Performed by: FAMILY MEDICINE

## 2024-11-13 PROCEDURE — 3078F DIAST BP <80 MM HG: CPT | Performed by: FAMILY MEDICINE

## 2024-11-13 RX ORDER — ASPIRIN 81 MG/1
81 TABLET ORAL DAILY
COMMUNITY

## 2024-11-13 RX ORDER — BUPROPION HYDROCHLORIDE 150 MG/1
150 TABLET, EXTENDED RELEASE ORAL 2 TIMES DAILY
Qty: 180 TABLET | Refills: 0 | Status: SHIPPED | OUTPATIENT
Start: 2024-11-13 | End: 2025-02-11

## 2024-11-13 RX ORDER — GABAPENTIN 100 MG/1
100 CAPSULE ORAL 2 TIMES DAILY
Qty: 180 CAPSULE | Refills: 0 | Status: SHIPPED | OUTPATIENT
Start: 2024-11-13 | End: 2025-02-11

## 2024-11-13 RX ORDER — ATORVASTATIN CALCIUM 80 MG/1
80 TABLET, FILM COATED ORAL DAILY
Qty: 90 TABLET | Refills: 0 | Status: SHIPPED | OUTPATIENT
Start: 2024-11-13

## 2024-11-13 ASSESSMENT — ENCOUNTER SYMPTOMS
DYSURIA: 0
DIZZINESS: 0
SLEEP DISTURBANCE: 0
HEADACHES: 0
POLYDIPSIA: 0
FATIGUE: 0
NAUSEA: 0
PALPITATIONS: 0
MYALGIAS: 0
DYSPHORIC MOOD: 0
ABDOMINAL PAIN: 0
SHORTNESS OF BREATH: 0
DIFFICULTY URINATING: 0
CONSTIPATION: 0
BLOOD IN STOOL: 0
VOMITING: 0
POLYPHAGIA: 0
DIARRHEA: 0

## 2024-11-13 NOTE — PROGRESS NOTES
Subjective   Patient ID: Morro Seo is a 59 y.o. male who presents for Diabetes, Hyperlipidemia, and Anxiety (recheck) and multiple issues    Diabetes  Pertinent negatives for hypoglycemia include no dizziness or headaches. Pertinent negatives for diabetes include no chest pain, no fatigue, no polydipsia, no polyphagia and no polyuria.   Hyperlipidemia  Pertinent negatives include no chest pain, myalgias or shortness of breath.   Anxiety  Patient reports no chest pain, dizziness, nausea, palpitations or shortness of breath.         Lipids/DM; due for recheck  CAD: recent cath showed nonobstructive CAD. Seeing cardiology.  No chest pains  Neuropathy: stable  Mood: Improved and controlled.  Wife states he is doing better mentally  Hypertension: Controlled    Review of Systems   Constitutional:  Negative for fatigue.   Eyes:  Negative for visual disturbance.   Respiratory:  Negative for shortness of breath.    Cardiovascular:  Negative for chest pain and palpitations.   Gastrointestinal:  Negative for abdominal pain, blood in stool, constipation, diarrhea, nausea and vomiting.   Endocrine: Negative for polydipsia, polyphagia and polyuria.   Genitourinary:  Negative for difficulty urinating and dysuria.   Musculoskeletal:  Negative for myalgias.   Skin:  Negative for rash.   Neurological:  Negative for dizziness and headaches.   Psychiatric/Behavioral:  Negative for dysphoric mood and sleep disturbance.        Objective   Pulse 69   Temp 36.3 °C (97.4 °F)   Wt 133 kg (294 lb)   SpO2 95%   BMI 43.39 kg/m²     Physical Exam  Vitals and nursing note reviewed.   Constitutional:       General: He is not in acute distress.     Appearance: Normal appearance. He is not toxic-appearing.   HENT:      Head: Normocephalic.      Nose: Nose normal.   Eyes:      Pupils: Pupils are equal, round, and reactive to light.   Neck:      Vascular: No carotid bruit.   Cardiovascular:      Rate and Rhythm: Normal rate and regular  rhythm.      Heart sounds: No murmur heard.  Pulmonary:      Effort: Pulmonary effort is normal. No respiratory distress.      Breath sounds: Normal breath sounds.   Abdominal:      Palpations: Abdomen is soft.      Tenderness: There is no abdominal tenderness. There is no guarding.   Musculoskeletal:         General: No tenderness.      Cervical back: Neck supple.      Right lower leg: No edema.      Left lower leg: No edema.   Skin:     General: Skin is warm.   Neurological:      General: No focal deficit present.      Mental Status: He is alert.      Cranial Nerves: No cranial nerve deficit.   Psychiatric:         Mood and Affect: Mood normal.         Assessment/Plan   Problem List Items Addressed This Visit             ICD-10-CM    Hyperlipidemia E78.5    Relevant Medications    atorvastatin (Lipitor) 80 mg tablet    Type 2 diabetes mellitus, without long-term current use of insulin (Multi) E11.9    Relevant Medications    SITagliptin phosphate (Januvia) 100 mg tablet    atorvastatin (Lipitor) 80 mg tablet    Depression F32.A    Relevant Medications    buPROPion SR (Wellbutrin SR) 150 mg 12 hr tablet    Essential hypertension - Primary I10     Other Visit Diagnoses         Codes    Other diabetic neurological complication associated with type 2 diabetes mellitus     E11.49    Relevant Medications    gabapentin (Neurontin) 100 mg capsule        Reviewed prior blood work and recent cath.  Recommendations given.  If A1c greater than 9%, will refer to endocrinology

## 2024-11-13 NOTE — PATIENT INSTRUCTIONS
Recommend a predominant low fat whole foods plant based diet.  Cut back on meat, dairy, processed carbs, salt and oils(especially palm and coconut). Increase fiber in your diet.  Decrease alcohol as much as possible if you drink. Recommend regular exercise most days of the week(goal up to 150min per week). Also recommend good sleep habits aiming for 7-8 hours per night.     Obtain your blood work    Continue your current meds    Follow up with your specialists as scheduled    Return in 3 months, sooner if needed

## 2024-11-26 DIAGNOSIS — I51.7 LEFT VENTRICULAR HYPERTROPHY: ICD-10-CM

## 2024-11-26 DIAGNOSIS — G47.33 OBSTRUCTIVE SLEEP APNEA: ICD-10-CM

## 2024-11-26 DIAGNOSIS — I10 ESSENTIAL HYPERTENSION: ICD-10-CM

## 2024-11-26 DIAGNOSIS — E11.69 TYPE 2 DIABETES MELLITUS WITH OTHER SPECIFIED COMPLICATION, WITHOUT LONG-TERM CURRENT USE OF INSULIN: ICD-10-CM

## 2024-11-26 DIAGNOSIS — I50.32 CHRONIC DIASTOLIC (CONGESTIVE) HEART FAILURE: ICD-10-CM

## 2024-11-26 NOTE — TELEPHONE ENCOUNTER
Pt called rx line @ 9:22am requesting RF on Metformin. [facility]    Next OV 2/13/25  Pt compliant  Ok for RF?  Please advise. Thanks. JW

## 2024-12-02 ENCOUNTER — APPOINTMENT (OUTPATIENT)
Dept: PHARMACY | Facility: HOSPITAL | Age: 59
End: 2024-12-02
Payer: COMMERCIAL

## 2024-12-02 DIAGNOSIS — E11.65 TYPE 2 DIABETES MELLITUS WITH HYPERGLYCEMIA, WITHOUT LONG-TERM CURRENT USE OF INSULIN: ICD-10-CM

## 2024-12-02 RX ORDER — POTASSIUM CHLORIDE 750 MG/1
10 TABLET, EXTENDED RELEASE ORAL DAILY
Qty: 90 TABLET | Refills: 1 | Status: SHIPPED | OUTPATIENT
Start: 2024-12-02

## 2024-12-02 RX ORDER — DULAGLUTIDE 0.75 MG/.5ML
0.75 INJECTION, SOLUTION SUBCUTANEOUS WEEKLY
Qty: 2 ML | Refills: 0 | Status: SHIPPED | OUTPATIENT
Start: 2024-12-02

## 2024-12-02 RX ORDER — METFORMIN HYDROCHLORIDE 1000 MG/1
1000 TABLET ORAL
Qty: 180 TABLET | Refills: 0 | Status: SHIPPED | OUTPATIENT
Start: 2024-12-02

## 2024-12-02 NOTE — ASSESSMENT & PLAN NOTE
Patient's diabetes is currenty uncontrolled, as shown by A1c of 9% (goal < 7%).     During our visit today, we discuss that morning sugars are still elevated above goal. We discuss initiation of GLP-1 therapy. Patient has a history of Trulicity but is unsure why it was stopped. He is agreeable to re-starting therapy. We discuss that this will allow us to stop the Januvia (duplicate therapy).     Plan:  START Trulicity 0.75 mg weekly  Counseled patient on MOA, expectations, side effects, duration of therapy, contraindications, administration techniques, and monitoring parameters  Answered all other questions and concerns   STOP Januvia 100 mg daily  CONTINUE Farxiga, acarbose, metformin

## 2024-12-02 NOTE — PROGRESS NOTES
Clinical Pharmacy Appointment    Patient ID: Morro Seo is a 59 y.o. male who presents for Diabetes.    Pt is here for Follow Up.     Referring Provider: Lisseth Thomas DO  PCP: Lisseth Thomas DO   Last visit with PCP: 8/8/2024   Next visit with PCP: 11/12/2024    Cardiology: Dr. Núñez     Subjective     Interval History  Has continued to use CGM.   Scheduled a heart cath procedure for 10/9    HPI  Type II Diabetes  Current  Pharmacotherapy:   Acarbose 50 mg TID  Metformin 1000 mg BID  Januvia 100 mg daily  Farxiga 10 mg daily     SECONDARY PREVENTION  - Statin? Atorvastatin 80 mg   LDL: 16   TC: 76  - ACE-I/ARB? No   UACR: 55.6 (4/26/2021)   BP: 114/60  - Aspirin? Yes    -The ASCVD Risk score (Belem GARCIA, et al., 2019) failed to calculate for the following reasons:    Risk score cannot be calculated because patient has a medical history suggesting prior/existing ASCVD      Current monitoring regimen:   Patient is using: CGM (FreeStyle Aliza)     SMBG Fasting Readings: 235 right now  250 when he got up.       Pertinent PMH Review:  - PMH of Pancreatitis: n/a  - PMH/FH of Medullary Thyroid Cancer: n/a  - PMH of Retinopathy: n/a       Drug Interactions  No relevant drug interactions were noted.        Objective   Allergies   Allergen Reactions    Jardiance [Empagliflozin] Rash    Penicillins Unknown     Unknown reaction as child    Sulfamethoxazole-Trimethoprim Rash     Social History     Social History Narrative    Not on file      Medication Review  Current Outpatient Medications   Medication Instructions    acarbose (Precose) 50 mg tablet TAKE 1 TABLET BY MOUTH 3 TIMES DAILY (MORNING, MIDDAY, LATE AFTERNOON).    albuterol 90 mcg/actuation inhaler 2 puffs, inhalation, Every 4 hours PRN    ammonium lactate (Lac-Hydrin) 12 % lotion USE 1 APPLICATION TO THE AFFECTED AREA EVERY NIGHT    aspirin 81 mg, oral, Daily    atorvastatin (LIPITOR) 80 mg, oral, Daily    budesonide-formoteroL (Symbicort) 160-4.5  mcg/actuation inhaler 2 puffs, inhalation, 2 times daily RT, Rinse mouth with water after use to reduce aftertaste and incidence of candidiasis. Do not swallow.    buPROPion SR (WELLBUTRIN SR) 150 mg, oral, 2 times daily, Do not crush, chew, or split.    dapagliflozin propanediol (FARXIGA) 10 mg, oral, Daily    FreeStyle Aliza 3 Sensor device Use for continuous glucose monitoring. Change every 14 days.    gabapentin (NEURONTIN) 100 mg, oral, 2 times daily    ipratropium-albuteroL (Duo-Neb) 0.5-2.5 mg/3 mL nebulizer solution 3 mL, nebulization, Every 4 hours PRN    losartan (COZAAR) 25 mg, oral, Daily    metFORMIN (GLUCOPHAGE) 1,000 mg, oral, 2 times daily (morning and late afternoon)    omeprazole (PRILOSEC) 40 mg, oral, Daily before breakfast, Do not crush or chew.    potassium chloride CR 10 mEq ER tablet 10 mEq, oral, Daily    spironolactone (ALDACTONE) 25 mg, oral, Daily    tiotropium (Spiriva Respimat) 2.5 mcg/actuation inhaler 2 puffs, inhalation, Daily    torsemide (DEMADEX) 20 mg, oral, Daily, as directed    Trulicity 0.75 mg, subcutaneous, Weekly      Vitals  BP Readings from Last 2 Encounters:   11/13/24 132/74   11/06/24 124/76     BMI Readings from Last 1 Encounters:   11/13/24 43.39 kg/m²      Labs  A1C  Lab Results   Component Value Date    HGBA1C 9.0 08/21/2024    HGBA1C 10.5 (H) 07/03/2024    HGBA1C 8.6 (H) 02/19/2024     BMP  Lab Results   Component Value Date    CALCIUM 9.3 10/01/2024     10/01/2024    K 4.5 10/01/2024    CO2 31 10/01/2024    CL 99 10/01/2024    BUN 13 10/01/2024    CREATININE 0.72 10/01/2024    EGFR >90 10/01/2024     LFTs  Lab Results   Component Value Date    ALT 23 07/03/2024    AST 17 07/03/2024    ALKPHOS 106 07/03/2024    BILITOT 1.5 (H) 07/03/2024     FLP  Lab Results   Component Value Date    TRIG 148 11/15/2023    CHOL 76 11/15/2023    LDLF 30 03/28/2022    LDLCALC 16 11/15/2023    HDL 30.5 11/15/2023     Urine Microalbumin  Lab Results   Component Value Date     MICROALBCREA 37.2 (H) 04/26/2021     Weight Management  Wt Readings from Last 3 Encounters:   11/13/24 133 kg (294 lb)   11/06/24 135 kg (297 lb 6.4 oz)   10/24/24 132 kg (291 lb 0.1 oz)      There is no height or weight on file to calculate BMI.     Patient Discussion:  Dr. Thomas took him off the celecoxib. Stopped for GI bleed  Pain is worsening significantly. Hard to accomplish ADLs.   Tylenol and Aleve with no relief.   Dr. Thomas would like to wait until we get results of the EGD before restarting.   Started on losartan by cardiology and has been doing well. No questions/concerns at this time   Reminded him to get labs done to monitor electrolytes.     Assessment/Plan   Problem List Items Addressed This Visit       Type 2 diabetes mellitus, without long-term current use of insulin (Multi)     Patient's diabetes is currenty uncontrolled, as shown by A1c of 9% (goal < 7%).     During our visit today, we discuss that morning sugars are still elevated above goal. We discuss initiation of GLP-1 therapy. Patient has a history of Trulicity but is unsure why it was stopped. He is agreeable to re-starting therapy. We discuss that this will allow us to stop the Januvia (duplicate therapy).     Plan:  START Trulicity 0.75 mg weekly  Counseled patient on MOA, expectations, side effects, duration of therapy, contraindications, administration techniques, and monitoring parameters  Answered all other questions and concerns   STOP Januvia 100 mg daily  CONTINUE Farxiga, acarbose, metformin          Relevant Medications    dulaglutide (Trulicity) 0.75 mg/0.5 mL pen injector    Other Relevant Orders    Referral to Clinical Pharmacy       Clinical Pharmacist follow-up: 12/23 @ 12 PM, Telehealth visit    Continue all meds under the continuation of care with the referring provider and clinical pharmacy team.    Thank you,  Ricarda Wong, PharmD  Clinical Pharmacy Specialist     Verbal consent to manage patient's drug therapy was  obtained from the patient. They were informed they may decline to participate or withdraw from participation in pharmacy services at any time.Fitting CGM

## 2024-12-04 ENCOUNTER — LAB (OUTPATIENT)
Dept: LAB | Facility: LAB | Age: 59
End: 2024-12-04
Payer: MEDICARE

## 2024-12-04 DIAGNOSIS — I10 ESSENTIAL HYPERTENSION: ICD-10-CM

## 2024-12-04 DIAGNOSIS — I51.9 LV DYSFUNCTION: ICD-10-CM

## 2024-12-04 DIAGNOSIS — E11.49 OTHER DIABETIC NEUROLOGICAL COMPLICATION ASSOCIATED WITH TYPE 2 DIABETES MELLITUS: ICD-10-CM

## 2024-12-04 DIAGNOSIS — F33.0 MILD EPISODE OF RECURRENT MAJOR DEPRESSIVE DISORDER (CMS-HCC): ICD-10-CM

## 2024-12-04 DIAGNOSIS — E78.5 HYPERLIPIDEMIA, UNSPECIFIED HYPERLIPIDEMIA TYPE: ICD-10-CM

## 2024-12-04 LAB
ALBUMIN SERPL BCP-MCNC: 4.4 G/DL (ref 3.4–5)
ALP SERPL-CCNC: 90 U/L (ref 33–120)
ALT SERPL W P-5'-P-CCNC: 22 U/L (ref 10–52)
ANION GAP SERPL CALC-SCNC: 13 MMOL/L (ref 10–20)
AST SERPL W P-5'-P-CCNC: 18 U/L (ref 9–39)
BILIRUB SERPL-MCNC: 1.4 MG/DL (ref 0–1.2)
BUN SERPL-MCNC: 23 MG/DL (ref 6–23)
CALCIUM SERPL-MCNC: 9.2 MG/DL (ref 8.6–10.3)
CHLORIDE SERPL-SCNC: 97 MMOL/L (ref 98–107)
CHOLEST SERPL-MCNC: 101 MG/DL (ref 0–199)
CHOLESTEROL/HDL RATIO: 2.9
CO2 SERPL-SCNC: 33 MMOL/L (ref 21–32)
CREAT SERPL-MCNC: 0.94 MG/DL (ref 0.5–1.3)
EGFRCR SERPLBLD CKD-EPI 2021: >90 ML/MIN/1.73M*2
GLUCOSE SERPL-MCNC: 148 MG/DL (ref 74–99)
HDLC SERPL-MCNC: 34.5 MG/DL
LDLC SERPL CALC-MCNC: 37 MG/DL
NON HDL CHOLESTEROL: 67 MG/DL (ref 0–149)
POTASSIUM SERPL-SCNC: 4.7 MMOL/L (ref 3.5–5.3)
PROT SERPL-MCNC: 6.7 G/DL (ref 6.4–8.2)
SODIUM SERPL-SCNC: 138 MMOL/L (ref 136–145)
TRIGL SERPL-MCNC: 150 MG/DL (ref 0–149)
VLDL: 30 MG/DL (ref 0–40)

## 2024-12-04 PROCEDURE — 36415 COLL VENOUS BLD VENIPUNCTURE: CPT

## 2024-12-04 PROCEDURE — 80061 LIPID PANEL: CPT

## 2024-12-04 PROCEDURE — 83036 HEMOGLOBIN GLYCOSYLATED A1C: CPT

## 2024-12-04 PROCEDURE — 80053 COMPREHEN METABOLIC PANEL: CPT

## 2024-12-05 LAB
EST. AVERAGE GLUCOSE BLD GHB EST-MCNC: 246 MG/DL
HBA1C MFR BLD: 10.2 %

## 2024-12-23 ENCOUNTER — APPOINTMENT (OUTPATIENT)
Dept: PHARMACY | Facility: HOSPITAL | Age: 59
End: 2024-12-23
Payer: MEDICARE

## 2024-12-23 DIAGNOSIS — E11.65 TYPE 2 DIABETES MELLITUS WITH HYPERGLYCEMIA, WITHOUT LONG-TERM CURRENT USE OF INSULIN: ICD-10-CM

## 2024-12-23 DIAGNOSIS — E11.69 TYPE 2 DIABETES MELLITUS WITH OTHER SPECIFIED COMPLICATION, WITHOUT LONG-TERM CURRENT USE OF INSULIN: Primary | ICD-10-CM

## 2024-12-23 NOTE — PROGRESS NOTES
"  Clinical Pharmacy Appointment    Patient ID: Morro Seo is a 59 y.o. male who presents for Diabetes    Pt is here for Follow Up.     Referring Provider: Lisseth Thomas DO  PCP: Lisseth Thomas DO   Last visit with PCP: 11/13/24  Next visit with PCP: 2/13/25    Cardiology: Dr. Núñez, 2/18/25  Pulmonology: Dr. Brandon, 5/20/25    Subjective     Interval History  Has continued to use CGM.-switched to Dexcom G7. They will not cover this on Medicare per report. Confirmed with Ricarda is Medicare will not cover this. I did explain this to the patient.   Had heart cath on 10/24/24  Started Trulicity 0.75mg weekly, has 2 pens left. Has no concerns with side effects.    HPI  Type II Diabetes  Current  Pharmacotherapy:   Acarbose 50 mg three times daily with meals  Metformin IR 1000 mg twice daily  Trulicity 0.75mg once weekly  Farxiga 10 mg daily     SECONDARY PREVENTION  - Statin? Atorvastatin 80 mg   LDL: 16   TC: 76  - ACE-I/ARB? No   UACR: 55.6 (4/26/2021)   BP: 114/60  - Aspirin? Yes    -The ASCVD Risk score (Belem GARCIA, et al., 2019) failed to calculate for the following reasons:    Risk score cannot be calculated because patient has a medical history suggesting prior/existing ASCVD      Current monitoring regimen:   Patient is using: CGM (Dexcom G7)  \"They can get up to 400 per report when he eats\"  He is not primarily eating until evening, but this is not new  FBGs: 180s  He has 2 pens of low dose Trulicity left.  Medicare being new    Pertinent PMH Review:  - PMH of Pancreatitis: n/a  - PMH/FH of Medullary Thyroid Cancer: n/a  - PMH of Retinopathy: n/a      Lifestyle Update:  Not eating as much pasta and bread as previous. Potatoes/pasta/bread is what \"kills him\" not sweets per report    His wife broke her hip so he is doing a little more activity than usual.    Drug Interactions  No relevant drug interactions were noted.    Objective   Allergies   Allergen Reactions    Jardiance [Empagliflozin] Rash    " Penicillins Unknown     Unknown reaction as child    Sulfamethoxazole-Trimethoprim Rash     Social History     Social History Narrative    Not on file      Medication Review  Current Outpatient Medications   Medication Instructions    acarbose (Precose) 50 mg tablet TAKE 1 TABLET BY MOUTH 3 TIMES DAILY (MORNING, MIDDAY, LATE AFTERNOON).    albuterol 90 mcg/actuation inhaler 2 puffs, inhalation, Every 4 hours PRN    ammonium lactate (Lac-Hydrin) 12 % lotion USE 1 APPLICATION TO THE AFFECTED AREA EVERY NIGHT    aspirin 81 mg, oral, Daily    atorvastatin (LIPITOR) 80 mg, oral, Daily    budesonide-formoteroL (Symbicort) 160-4.5 mcg/actuation inhaler 2 puffs, inhalation, 2 times daily RT, Rinse mouth with water after use to reduce aftertaste and incidence of candidiasis. Do not swallow.    buPROPion SR (WELLBUTRIN SR) 150 mg, oral, 2 times daily, Do not crush, chew, or split.    dapagliflozin propanediol (FARXIGA) 10 mg, oral, Daily    FreeStyle Aliza 3 Sensor device Use for continuous glucose monitoring. Change every 14 days.    gabapentin (NEURONTIN) 100 mg, oral, 2 times daily    ipratropium-albuteroL (Duo-Neb) 0.5-2.5 mg/3 mL nebulizer solution 3 mL, nebulization, Every 4 hours PRN    losartan (COZAAR) 25 mg, oral, Daily    metFORMIN (GLUCOPHAGE) 1,000 mg, oral, 2 times daily (morning and late afternoon)    omeprazole (PRILOSEC) 40 mg, oral, Daily before breakfast, Do not crush or chew.    potassium chloride CR 10 mEq ER tablet 10 mEq, oral, Daily    spironolactone (ALDACTONE) 25 mg, oral, Daily    tiotropium (Spiriva Respimat) 2.5 mcg/actuation inhaler 2 puffs, inhalation, Daily    torsemide (DEMADEX) 20 mg, oral, Daily, as directed    Trulicity 0.75 mg, subcutaneous, Weekly      Vitals  BP Readings from Last 2 Encounters:   11/13/24 132/74   11/06/24 124/76     BMI Readings from Last 1 Encounters:   11/13/24 43.39 kg/m²      Labs  A1C  Lab Results   Component Value Date    HGBA1C 10.2 (H) 12/04/2024    HGBA1C 9.0  08/21/2024    HGBA1C 10.5 (H) 07/03/2024     BMP  Lab Results   Component Value Date    CALCIUM 9.2 12/04/2024     12/04/2024    K 4.7 12/04/2024    CO2 33 (H) 12/04/2024    CL 97 (L) 12/04/2024    BUN 23 12/04/2024    CREATININE 0.94 12/04/2024    EGFR >90 12/04/2024     LFTs  Lab Results   Component Value Date    ALT 22 12/04/2024    AST 18 12/04/2024    ALKPHOS 90 12/04/2024    BILITOT 1.4 (H) 12/04/2024     FLP  Lab Results   Component Value Date    TRIG 150 (H) 12/04/2024    CHOL 101 12/04/2024    LDLF 30 03/28/2022    LDLCALC 37 12/04/2024    HDL 34.5 12/04/2024     Urine Microalbumin  Lab Results   Component Value Date    MICROALBCREA 37.2 (H) 04/26/2021     Weight Management  Wt Readings from Last 3 Encounters:   11/13/24 133 kg (294 lb)   11/06/24 135 kg (297 lb 6.4 oz)   10/24/24 132 kg (291 lb 0.1 oz)      There is no height or weight on file to calculate BMI.       Assessment/Plan   Problem List Items Addressed This Visit          Endocrine/Metabolic    Type 2 diabetes mellitus, without long-term current use of insulin (Multi) - Primary    Relevant Orders    Referral to Clinical Pharmacy     Diabetes Mellitus  Patient's A1c is 10.2%. Goal ideally <7% based off past medical history information  Next A1c due 3/4/25  Will:  Continue Acarbose 50mg three times daily with meals  Continue Trulicity 0.75mg weekly on Sundays  Patient has 2 pens left of the low dose. Therefore, will check in and ensure everything is going okay before increasing to the next strength in dosing, although based off today I would expect him to be able to increase the dose  Continue Farxiga 10mg daily (max dose)  Continue Metformin IR 1000mg twice daily (max dose)  Preferentially patient would like to stay off insulin per report  Asked patient to check blood sugars 1-2 times via fingerstick or via CGM (Dexcom G7)    Clinical Pharmacist follow-up: 1/3/25 @11:40AM, Telehealth visit    Continue all meds under the continuation of care  with the referring provider and clinical pharmacy team.    Thank you,  Farhan FortuneD, [facility]  Clinical Pharmacy Specialist-Primary Care  [phone]    Verbal consent to manage patient's drug therapy was obtained from the patient. They were informed they may decline to participate or withdraw from participation in pharmacy services at any time.Fitting CGM

## 2024-12-26 DIAGNOSIS — E11.69 TYPE 2 DIABETES MELLITUS WITH OTHER SPECIFIED COMPLICATION, WITHOUT LONG-TERM CURRENT USE OF INSULIN: Primary | ICD-10-CM

## 2024-12-27 ENCOUNTER — TELEPHONE (OUTPATIENT)
Dept: PRIMARY CARE | Facility: CLINIC | Age: 59
End: 2024-12-27
Payer: COMMERCIAL

## 2024-12-27 NOTE — TELEPHONE ENCOUNTER
----- Message from Lisseth Thomas sent at 12/26/2024 11:59 PM EST -----  Please tell patient that his blood sugar is still very high at 10.2, goal less than 7.  Recommend that he see endocrinology.  Referral placed

## 2024-12-30 NOTE — PROGRESS NOTES
"  Clinical Pharmacy Appointment    Patient ID: Morro Seo is a 59 y.o. male who presents for Diabetes    Pt is here for Follow Up.     Referring Provider: Lisseth Thomas DO  PCP: Lisseth Thomas DO   Last visit with PCP: 11/13/24  Next visit with PCP: 2/13/25    Cardiology: Dr. Núñez, 2/18/25  Pulmonology: Dr. Brandon, 5/20/25    Subjective     Interval History  None    HPI  Type II Diabetes  Current  Pharmacotherapy:   Acarbose 50 mg three times daily with meals  Metformin IR 1000 mg twice daily  Trulicity 0.75mg once weekly  Farxiga 10 mg daily     SECONDARY PREVENTION  - Statin? Atorvastatin 80 mg   LDL: 16   TC: 76  - ACE-I/ARB? No   UACR: 55.6 (4/26/2021)   BP: 114/60  - Aspirin? Yes    -The ASCVD Risk score (Belem GARCIA, et al., 2019) failed to calculate for the following reasons:    Risk score cannot be calculated because patient has a medical history suggesting prior/existing ASCVD      Current monitoring regimen:   Patient is using: CGM (Dexcom G7)    SMBG: feels that they are pretty much the same  189 at 11:45 AM (15 hours fasting)    Pertinent PMH Review:  - PMH of Pancreatitis: n/a  - PMH/FH of Medullary Thyroid Cancer: n/a  - PMH of Retinopathy: n/a      Lifestyle Update:  Not eating as much pasta and bread as previous. Potatoes/pasta/bread is what \"kills him\" not sweets per report    His wife broke her hip so he is doing a little more activity than usual.    Drug Interactions  No relevant drug interactions were noted.    Objective   Allergies   Allergen Reactions    Jardiance [Empagliflozin] Rash    Penicillins Unknown     Unknown reaction as child    Sulfamethoxazole-Trimethoprim Rash     Social History     Social History Narrative    Not on file      Medication Review  Current Outpatient Medications   Medication Instructions    acarbose (Precose) 50 mg tablet TAKE 1 TABLET BY MOUTH 3 TIMES DAILY (MORNING, MIDDAY, LATE AFTERNOON).    albuterol 90 mcg/actuation inhaler 2 puffs, inhalation, " Every 4 hours PRN    ammonium lactate (Lac-Hydrin) 12 % lotion USE 1 APPLICATION TO THE AFFECTED AREA EVERY NIGHT    aspirin 81 mg, oral, Daily    atorvastatin (LIPITOR) 80 mg, oral, Daily    blood sugar diagnostic (Blood Glucose Test) strip Test once daily in the morning    blood-glucose meter misc Use to test blood sugars once daily    budesonide-formoteroL (Symbicort) 160-4.5 mcg/actuation inhaler 2 puffs, inhalation, 2 times daily RT, Rinse mouth with water after use to reduce aftertaste and incidence of candidiasis. Do not swallow.    buPROPion SR (WELLBUTRIN SR) 150 mg, oral, 2 times daily, Do not crush, chew, or split.    dapagliflozin propanediol (FARXIGA) 10 mg, oral, Daily    FreeStyle Aliza 3 Sensor device Use for continuous glucose monitoring. Change every 14 days.    gabapentin (NEURONTIN) 100 mg, oral, 2 times daily    ipratropium-albuteroL (Duo-Neb) 0.5-2.5 mg/3 mL nebulizer solution 3 mL, nebulization, Every 4 hours PRN    lancets misc Test once daily in the morning    losartan (COZAAR) 25 mg, oral, Daily    metFORMIN (GLUCOPHAGE) 1,000 mg, oral, 2 times daily (morning and late afternoon)    omeprazole (PRILOSEC) 40 mg, oral, Daily before breakfast, Do not crush or chew.    potassium chloride CR 10 mEq ER tablet 10 mEq, oral, Daily    spironolactone (ALDACTONE) 25 mg, oral, Daily    tiotropium (Spiriva Respimat) 2.5 mcg/actuation inhaler 2 puffs, inhalation, Daily    torsemide (DEMADEX) 20 mg, oral, Daily, as directed    Trulicity 1.5 mg, subcutaneous, Weekly      Vitals  BP Readings from Last 2 Encounters:   11/13/24 132/74   11/06/24 124/76     BMI Readings from Last 1 Encounters:   11/13/24 43.39 kg/m²      Labs  A1C  Lab Results   Component Value Date    HGBA1C 10.2 (H) 12/04/2024    HGBA1C 9.0 08/21/2024    HGBA1C 10.5 (H) 07/03/2024     BMP  Lab Results   Component Value Date    CALCIUM 9.2 12/04/2024     12/04/2024    K 4.7 12/04/2024    CO2 33 (H) 12/04/2024    CL 97 (L) 12/04/2024     BUN 23 12/04/2024    CREATININE 0.94 12/04/2024    EGFR >90 12/04/2024     LFTs  Lab Results   Component Value Date    ALT 22 12/04/2024    AST 18 12/04/2024    ALKPHOS 90 12/04/2024    BILITOT 1.4 (H) 12/04/2024     FLP  Lab Results   Component Value Date    TRIG 150 (H) 12/04/2024    CHOL 101 12/04/2024    LDLF 30 03/28/2022    LDLCALC 37 12/04/2024    HDL 34.5 12/04/2024     Urine Microalbumin  Lab Results   Component Value Date    MICROALBCREA 37.2 (H) 04/26/2021     Weight Management  Wt Readings from Last 3 Encounters:   11/13/24 133 kg (294 lb)   11/06/24 135 kg (297 lb 6.4 oz)   10/24/24 132 kg (291 lb 0.1 oz)      There is no height or weight on file to calculate BMI.       Assessment/Plan   Problem List Items Addressed This Visit       Type 2 diabetes mellitus, without long-term current use of insulin (Multi)     Patient's diabetes is currenty uncontrolled, as shown by A1c of 10.2% (goal < 7%).     Patient has done well with first month of Trulicity and does not note any AE at this time. He has switched to Medicare in the new year, so we discuss that he will no longer be able to get his CGM (not on insulin therapy). Given blood sugars, I do recommend a dose increase of Trulicity.    Plan:  INCREASE to Trulicity 1.5 mg weekly  Counseled patient on MOA, expectations, side effects, duration of therapy, contraindications, administration techniques, and monitoring parameters  Answered all other questions and concerns   CONTINUE Farxiga, acarbose, metformin          Relevant Medications    dulaglutide (Trulicity) 1.5 mg/0.5 mL pen injector injection    blood sugar diagnostic (Blood Glucose Test) strip    lancets misc    blood-glucose meter misc    Other Relevant Orders    Referral to Clinical Pharmacy       Clinical Pharmacist follow-up: 1/24/25 @11:40AM, Telehealth visit    Continue all meds under the continuation of care with the referring provider and clinical pharmacy team.    Thank you,  Ricarda Wong,  PharmD  Clinical Pharmacy Specialist     Verbal consent to manage patient's drug therapy was obtained from the patient. They were informed they may decline to participate or withdraw from participation in pharmacy services at any time.Fitting CGM

## 2025-01-02 ENCOUNTER — HOSPITAL ENCOUNTER (OUTPATIENT)
Dept: CARDIOLOGY | Facility: HOSPITAL | Age: 60
Discharge: HOME | End: 2025-01-02
Payer: MEDICARE

## 2025-01-02 ENCOUNTER — TELEPHONE (OUTPATIENT)
Dept: CARDIOLOGY | Facility: HOSPITAL | Age: 60
End: 2025-01-02
Payer: COMMERCIAL

## 2025-01-02 DIAGNOSIS — Z95.0 PRESENCE OF CARDIAC PACEMAKER: Primary | ICD-10-CM

## 2025-01-02 DIAGNOSIS — I44.2 COMPLETE HEART BLOCK: ICD-10-CM

## 2025-01-02 DIAGNOSIS — Z95.0 PRESENCE OF CARDIAC PACEMAKER: ICD-10-CM

## 2025-01-02 PROCEDURE — 93296 REM INTERROG EVL PM/IDS: CPT

## 2025-01-02 NOTE — TELEPHONE ENCOUNTER
----- Message from Nurse Ani SHORT sent at 1/2/2025 12:34 PM EST -----  Regarding: FW: SC-orange-very brief AT/AF per remote    ----- Message -----  From: Odette Cortez RN  Sent: 1/2/2025  12:02 PM EST  To: [facility] Card1 Clinical Support Pool  Subject: SC-orange-very brief AT/AF per remote            1 ATR recorded, EGM- tail-end SVT/AT/AF (~5 sec) with , VS/pvc.    Patient had had some brief SVT/AT in past.    Denies symptoms.  Thx.

## 2025-01-03 ENCOUNTER — APPOINTMENT (OUTPATIENT)
Dept: PHARMACY | Facility: HOSPITAL | Age: 60
End: 2025-01-03
Payer: COMMERCIAL

## 2025-01-03 DIAGNOSIS — E11.69 TYPE 2 DIABETES MELLITUS WITH OTHER SPECIFIED COMPLICATION, WITHOUT LONG-TERM CURRENT USE OF INSULIN: ICD-10-CM

## 2025-01-03 RX ORDER — LANCETS
EACH MISCELLANEOUS
Qty: 100 EACH | Refills: 3 | Status: SHIPPED | OUTPATIENT
Start: 2025-01-03

## 2025-01-03 RX ORDER — DEXTROSE 4 G
TABLET,CHEWABLE ORAL
Qty: 1 EACH | Refills: 0 | Status: SHIPPED | OUTPATIENT
Start: 2025-01-03

## 2025-01-03 RX ORDER — DULAGLUTIDE 1.5 MG/.5ML
1.5 INJECTION, SOLUTION SUBCUTANEOUS WEEKLY
Qty: 2 ML | Refills: 0 | Status: SHIPPED | OUTPATIENT
Start: 2025-01-03

## 2025-01-03 RX ORDER — METFORMIN HYDROCHLORIDE 1000 MG/1
1000 TABLET ORAL
Qty: 180 TABLET | Refills: 0 | Status: CANCELLED | OUTPATIENT
Start: 2025-01-03

## 2025-01-03 RX ORDER — IBUPROFEN 200 MG
CAPSULE ORAL
Qty: 100 EACH | Refills: 3 | Status: SHIPPED | OUTPATIENT
Start: 2025-01-03

## 2025-01-03 NOTE — ASSESSMENT & PLAN NOTE
Patient's diabetes is currenty uncontrolled, as shown by A1c of 10.2% (goal < 7%).     Patient has done well with first month of Trulicity and does not note any AE at this time. He has switched to Medicare in the new year, so we discuss that he will no longer be able to get his CGM (not on insulin therapy). Given blood sugars, I do recommend a dose increase of Trulicity.    Plan:  INCREASE to Trulicity 1.5 mg weekly  Counseled patient on MOA, expectations, side effects, duration of therapy, contraindications, administration techniques, and monitoring parameters  Answered all other questions and concerns   CONTINUE Farxiga, acarbose, metformin

## 2025-01-03 NOTE — TELEPHONE ENCOUNTER
Pt called rx line at 1022 requesting refill on pended med.   Pt states AMAN[facility] Bernabe is telling him there are no rx on hold.  Pt RX was sent on 12/2 so unsure why [facility] is stating this. Need to see if they have meds? Thanks, CG

## 2025-01-03 NOTE — TELEPHONE ENCOUNTER
Called and spoke with [facility], PT has 2 RF's still on file with them  Will get med ready for pt  Called and spoke with pt and informed him rx is being processed. Thanks. JW

## 2025-01-23 DIAGNOSIS — E11.69 TYPE 2 DIABETES MELLITUS WITH OTHER SPECIFIED COMPLICATION, WITHOUT LONG-TERM CURRENT USE OF INSULIN: ICD-10-CM

## 2025-01-23 DIAGNOSIS — K92.0 HEMATEMESIS WITH NAUSEA: ICD-10-CM

## 2025-01-23 DIAGNOSIS — E11.65 TYPE 2 DIABETES MELLITUS WITH HYPERGLYCEMIA, WITHOUT LONG-TERM CURRENT USE OF INSULIN: ICD-10-CM

## 2025-01-23 DIAGNOSIS — E11.49 OTHER DIABETIC NEUROLOGICAL COMPLICATION ASSOCIATED WITH TYPE 2 DIABETES MELLITUS: ICD-10-CM

## 2025-01-23 RX ORDER — DULAGLUTIDE 1.5 MG/.5ML
1.5 INJECTION, SOLUTION SUBCUTANEOUS WEEKLY
OUTPATIENT
Start: 2025-01-23

## 2025-01-24 ENCOUNTER — APPOINTMENT (OUTPATIENT)
Dept: PHARMACY | Facility: HOSPITAL | Age: 60
End: 2025-01-24
Payer: COMMERCIAL

## 2025-01-27 DIAGNOSIS — G47.33 OBSTRUCTIVE SLEEP APNEA: ICD-10-CM

## 2025-01-27 DIAGNOSIS — I10 ESSENTIAL HYPERTENSION: ICD-10-CM

## 2025-01-27 DIAGNOSIS — I50.32 CHRONIC DIASTOLIC (CONGESTIVE) HEART FAILURE: ICD-10-CM

## 2025-01-27 DIAGNOSIS — I51.7 LEFT VENTRICULAR HYPERTROPHY: ICD-10-CM

## 2025-01-27 RX ORDER — ACARBOSE 50 MG/1
TABLET ORAL
Qty: 90 TABLET | Refills: 0 | Status: CANCELLED | OUTPATIENT
Start: 2025-01-27

## 2025-01-27 NOTE — TELEPHONE ENCOUNTER
----- Message from Nurse Enma NAVA sent at 1/27/2025 12:41 PM EST -----  Regarding: Refill  Patient is requesting a refill of spironolactone 25 mg to be sent to [facility] in [place]

## 2025-01-27 NOTE — TELEPHONE ENCOUNTER
Pt called rx line at 1100a requesting pended med - PLUS    -Celexa 40mg, look like this med was discontinued on 4/4/24    Called pt, he insists on being out of all pended meds, including celexa, states he never stopped med    Next OV 2/13  Pt OUT before next OV  Pt uses [facility] Thx

## 2025-01-27 NOTE — PROGRESS NOTES
"  Clinical Pharmacy Appointment    Patient ID: Morro Seo is a 59 y.o. male who presents for Diabetes    Pt is here for Follow Up.     Referring Provider: Lisseth Thomas DO  PCP: Lisseth Thomas DO   Last visit with PCP: 11/13/24  Next visit with PCP: 2/13/25    Cardiology: Dr. Núñez, 2/18/25  Pulmonology: Dr. Brandon, 5/20/25    Subjective     Interval History  Trulicity dose increase    HPI  Type II Diabetes  Current  Pharmacotherapy:   Acarbose 50 mg three times daily with meals  Metformin IR 1000 mg twice daily  Trulicity 1.5 mg once weekly  Farxiga 10 mg daily     SECONDARY PREVENTION  - Statin? Atorvastatin 80 mg   LDL: 16   TC: 76  - ACE-I/ARB? No   UACR: 55.6 (4/26/2021)   BP: 114/60  - Aspirin? Yes    -The ASCVD Risk score (Belem GARCIA, et al., 2019) failed to calculate for the following reasons:    Risk score cannot be calculated because patient has a medical history suggesting prior/existing ASCVD      Current monitoring regimen:   Patient is using: finger sticks    SMBG:     Hypoglycemia:    Pertinent PMH Review:  - PMH of Pancreatitis: n/a  - PMH/FH of Medullary Thyroid Cancer: n/a  - PMH of Retinopathy: n/a      Lifestyle Update:  Not eating as much pasta and bread as previous. Potatoes/pasta/bread is what \"kills him\" not sweets per report    His wife broke her hip so he is doing a little more activity than usual.    Drug Interactions  No relevant drug interactions were noted.    Objective   Allergies   Allergen Reactions    Jardiance [Empagliflozin] Rash    Penicillins Unknown     Unknown reaction as child    Sulfamethoxazole-Trimethoprim Rash     Social History     Social History Narrative    Not on file      Medication Review  Current Outpatient Medications   Medication Instructions    acarbose (Precose) 50 mg tablet TAKE 1 TABLET BY MOUTH 3 TIMES DAILY (MORNING, MIDDAY, LATE AFTERNOON).    albuterol 90 mcg/actuation inhaler 2 puffs, inhalation, Every 4 hours PRN    ammonium lactate " (Lac-Hydrin) 12 % lotion USE 1 APPLICATION TO THE AFFECTED AREA EVERY NIGHT    aspirin 81 mg, oral, Daily    atorvastatin (LIPITOR) 80 mg, oral, Daily    blood sugar diagnostic (Blood Glucose Test) strip Test once daily in the morning    blood-glucose meter misc Use to test blood sugars once daily    budesonide-formoteroL (Symbicort) 160-4.5 mcg/actuation inhaler 2 puffs, inhalation, 2 times daily RT, Rinse mouth with water after use to reduce aftertaste and incidence of candidiasis. Do not swallow.    buPROPion SR (WELLBUTRIN SR) 150 mg, oral, 2 times daily, Do not crush, chew, or split.    dapagliflozin propanediol (FARXIGA) 10 mg, oral, Daily    dulaglutide (TRULICITY) 3 mg, subcutaneous, Weekly    FreeStyle Aliza 3 Sensor device Use for continuous glucose monitoring. Change every 14 days.    gabapentin (NEURONTIN) 100 mg, oral, 2 times daily    ipratropium-albuteroL (Duo-Neb) 0.5-2.5 mg/3 mL nebulizer solution 3 mL, nebulization, Every 4 hours PRN    lancets misc Test once daily in the morning    losartan (COZAAR) 25 mg, oral, Daily    metFORMIN (GLUCOPHAGE) 1,000 mg, oral, 2 times daily (morning and late afternoon)    omeprazole (PRILOSEC) 40 mg, oral, Daily before breakfast, Do not crush or chew.    potassium chloride CR 10 mEq ER tablet 10 mEq, oral, Daily    spironolactone (ALDACTONE) 25 mg, oral, Daily    tiotropium (Spiriva Respimat) 2.5 mcg/actuation inhaler 2 puffs, inhalation, Daily    torsemide (DEMADEX) 20 mg, oral, Daily, as directed      Vitals  BP Readings from Last 2 Encounters:   11/13/24 132/74   11/06/24 124/76     BMI Readings from Last 1 Encounters:   11/13/24 43.39 kg/m²      Labs  A1C  Lab Results   Component Value Date    HGBA1C 10.2 (H) 12/04/2024    HGBA1C 9.0 08/21/2024    HGBA1C 10.5 (H) 07/03/2024     BMP  Lab Results   Component Value Date    CALCIUM 9.2 12/04/2024     12/04/2024    K 4.7 12/04/2024    CO2 33 (H) 12/04/2024    CL 97 (L) 12/04/2024    BUN 23 12/04/2024     CREATININE 0.94 12/04/2024    EGFR >90 12/04/2024     LFTs  Lab Results   Component Value Date    ALT 22 12/04/2024    AST 18 12/04/2024    ALKPHOS 90 12/04/2024    BILITOT 1.4 (H) 12/04/2024     FLP  Lab Results   Component Value Date    TRIG 150 (H) 12/04/2024    CHOL 101 12/04/2024    LDLF 30 03/28/2022    LDLCALC 37 12/04/2024    HDL 34.5 12/04/2024     Urine Microalbumin  Lab Results   Component Value Date    MICROALBCREA 37.2 (H) 04/26/2021     Weight Management  Wt Readings from Last 3 Encounters:   11/13/24 133 kg (294 lb)   11/06/24 135 kg (297 lb 6.4 oz)   10/24/24 132 kg (291 lb 0.1 oz)      There is no height or weight on file to calculate BMI.       Assessment/Plan   Problem List Items Addressed This Visit       Type 2 diabetes mellitus, without long-term current use of insulin (Multi)     Patient's diabetes is currenty uncontrolled, as shown by A1c of 10.2% (goal < 7%).     Patient has done well with dose titration of Trulicity. He does not some increased fatigue with this dose. We are unable to assess blood sugars today. I remind patient of the importance of checking sugars while adjusting his medications. He is going to work on that this month.     Plan:  INCREASE to Trulicity 3 mg weekly  CONTINUE Farxiga, acarbose, metformin          Relevant Medications    dulaglutide (Trulicity) 3 mg/0.5 mL injection    Other Relevant Orders    Referral to Clinical Pharmacy       Clinical Pharmacist follow-up: 2/26 @ 10:40 AM, Telehealth visit    Continue all meds under the continuation of care with the referring provider and clinical pharmacy team.    Thank you,  Ricarda Wong, PharmD  Clinical Pharmacy Specialist     Verbal consent to manage patient's drug therapy was obtained from the patient. They were informed they may decline to participate or withdraw from participation in pharmacy services at any time.Fitting CGM

## 2025-01-28 RX ORDER — SPIRONOLACTONE 25 MG/1
25 TABLET ORAL DAILY
Qty: 90 TABLET | Refills: 0 | Status: SHIPPED | OUTPATIENT
Start: 2025-01-28 | End: 2025-04-28

## 2025-01-29 ENCOUNTER — TELEMEDICINE (OUTPATIENT)
Dept: PHARMACY | Facility: HOSPITAL | Age: 60
End: 2025-01-29
Payer: MEDICARE

## 2025-01-29 DIAGNOSIS — E11.69 TYPE 2 DIABETES MELLITUS WITH OTHER SPECIFIED COMPLICATION, WITHOUT LONG-TERM CURRENT USE OF INSULIN: ICD-10-CM

## 2025-01-29 RX ORDER — DULAGLUTIDE 1.5 MG/.5ML
1.5 INJECTION, SOLUTION SUBCUTANEOUS WEEKLY
Qty: 2 ML | Refills: 0 | Status: SHIPPED | OUTPATIENT
Start: 2025-01-29 | End: 2025-01-29 | Stop reason: DRUGHIGH

## 2025-01-29 NOTE — ASSESSMENT & PLAN NOTE
Patient's diabetes is currenty uncontrolled, as shown by A1c of 10.2% (goal < 7%).     Patient has done well with dose titration of Trulicity. He does not some increased fatigue with this dose. We are unable to assess blood sugars today. I remind patient of the importance of checking sugars while adjusting his medications. He is going to work on that this month.     Plan:  INCREASE to Trulicity 3 mg weekly  CONTINUE Farxiga, acarbose, metformin

## 2025-01-30 NOTE — TELEPHONE ENCOUNTER
Pt requesting rf of Metformin, he is out. Pharm told him to contact our office for rf.     Metformin - [facility]

## 2025-01-31 RX ORDER — OMEPRAZOLE 40 MG/1
40 CAPSULE, DELAYED RELEASE ORAL
Qty: 30 CAPSULE | Refills: 0 | Status: SHIPPED | OUTPATIENT
Start: 2025-01-31 | End: 2025-05-01

## 2025-01-31 RX ORDER — METFORMIN HYDROCHLORIDE 1000 MG/1
1000 TABLET ORAL
Qty: 180 TABLET | Refills: 0 | Status: SHIPPED | OUTPATIENT
Start: 2025-01-31

## 2025-01-31 RX ORDER — GABAPENTIN 100 MG/1
100 CAPSULE ORAL 2 TIMES DAILY
Qty: 180 CAPSULE | Refills: 0 | Status: SHIPPED | OUTPATIENT
Start: 2025-01-31 | End: 2025-05-01

## 2025-02-10 DIAGNOSIS — I51.7 LEFT VENTRICULAR HYPERTROPHY: ICD-10-CM

## 2025-02-10 DIAGNOSIS — I50.32 CHRONIC DIASTOLIC (CONGESTIVE) HEART FAILURE: ICD-10-CM

## 2025-02-10 DIAGNOSIS — I10 ESSENTIAL HYPERTENSION: ICD-10-CM

## 2025-02-10 DIAGNOSIS — G47.33 OBSTRUCTIVE SLEEP APNEA: ICD-10-CM

## 2025-02-10 DIAGNOSIS — R06.2 WHEEZING: ICD-10-CM

## 2025-02-10 DIAGNOSIS — J44.9 CHRONIC OBSTRUCTIVE PULMONARY DISEASE, UNSPECIFIED COPD TYPE (MULTI): ICD-10-CM

## 2025-02-10 RX ORDER — TIOTROPIUM BROMIDE AND OLODATEROL 3.124; 2.736 UG/1; UG/1
2 SPRAY, METERED RESPIRATORY (INHALATION) DAILY
Refills: 5 | OUTPATIENT
Start: 2025-02-10

## 2025-02-12 RX ORDER — SPIRONOLACTONE 25 MG/1
25 TABLET ORAL DAILY
Qty: 90 TABLET | Refills: 1 | Status: SHIPPED | OUTPATIENT
Start: 2025-02-12

## 2025-02-13 ENCOUNTER — APPOINTMENT (OUTPATIENT)
Dept: PRIMARY CARE | Facility: CLINIC | Age: 60
End: 2025-02-13
Payer: COMMERCIAL

## 2025-02-13 RX ORDER — LANCETS 33 GAUGE
EACH MISCELLANEOUS
COMMUNITY
Start: 2025-01-08

## 2025-02-18 ENCOUNTER — OFFICE VISIT (OUTPATIENT)
Dept: CARDIOLOGY | Facility: HOSPITAL | Age: 60
End: 2025-02-18
Payer: MEDICARE

## 2025-02-18 VITALS
SYSTOLIC BLOOD PRESSURE: 100 MMHG | HEART RATE: 83 BPM | BODY MASS INDEX: 44.43 KG/M2 | HEIGHT: 69 IN | WEIGHT: 300 LBS | DIASTOLIC BLOOD PRESSURE: 62 MMHG

## 2025-02-18 DIAGNOSIS — G47.33 OBSTRUCTIVE SLEEP APNEA: ICD-10-CM

## 2025-02-18 DIAGNOSIS — I50.32 CHRONIC DIASTOLIC (CONGESTIVE) HEART FAILURE: ICD-10-CM

## 2025-02-18 DIAGNOSIS — I10 ESSENTIAL HYPERTENSION: ICD-10-CM

## 2025-02-18 DIAGNOSIS — R60.0 LOCALIZED EDEMA: ICD-10-CM

## 2025-02-18 DIAGNOSIS — E78.5 HYPERLIPIDEMIA, UNSPECIFIED HYPERLIPIDEMIA TYPE: ICD-10-CM

## 2025-02-18 DIAGNOSIS — Z95.0 STATUS CARDIAC PACEMAKER: ICD-10-CM

## 2025-02-18 DIAGNOSIS — E11.69 TYPE 2 DIABETES MELLITUS WITH OTHER SPECIFIED COMPLICATION, WITHOUT LONG-TERM CURRENT USE OF INSULIN: ICD-10-CM

## 2025-02-18 DIAGNOSIS — I51.7 LEFT VENTRICULAR HYPERTROPHY: ICD-10-CM

## 2025-02-18 DIAGNOSIS — R93.1 AGATSTON CORONARY ARTERY CALCIUM SCORE GREATER THAN 400: ICD-10-CM

## 2025-02-18 DIAGNOSIS — R94.39: ICD-10-CM

## 2025-02-18 DIAGNOSIS — I51.9 LV DYSFUNCTION: ICD-10-CM

## 2025-02-18 PROBLEM — F17.200 MODERATE TOBACCO USE DISORDER: Status: RESOLVED | Noted: 2023-05-09 | Resolved: 2025-02-18

## 2025-02-18 PROCEDURE — 4010F ACE/ARB THERAPY RXD/TAKEN: CPT | Performed by: INTERNAL MEDICINE

## 2025-02-18 PROCEDURE — 93010 ELECTROCARDIOGRAM REPORT: CPT | Performed by: INTERNAL MEDICINE

## 2025-02-18 PROCEDURE — 3074F SYST BP LT 130 MM HG: CPT | Performed by: INTERNAL MEDICINE

## 2025-02-18 PROCEDURE — 1036F TOBACCO NON-USER: CPT | Performed by: INTERNAL MEDICINE

## 2025-02-18 PROCEDURE — 3078F DIAST BP <80 MM HG: CPT | Performed by: INTERNAL MEDICINE

## 2025-02-18 PROCEDURE — 99214 OFFICE O/P EST MOD 30 MIN: CPT | Performed by: INTERNAL MEDICINE

## 2025-02-18 PROCEDURE — 99214 OFFICE O/P EST MOD 30 MIN: CPT | Mod: 25 | Performed by: INTERNAL MEDICINE

## 2025-02-18 PROCEDURE — 3008F BODY MASS INDEX DOCD: CPT | Performed by: INTERNAL MEDICINE

## 2025-02-18 PROCEDURE — 93005 ELECTROCARDIOGRAM TRACING: CPT | Performed by: INTERNAL MEDICINE

## 2025-02-18 RX ORDER — ATORVASTATIN CALCIUM 80 MG/1
80 TABLET, FILM COATED ORAL DAILY
Qty: 90 TABLET | Refills: 1 | Status: SHIPPED | OUTPATIENT
Start: 2025-02-18

## 2025-02-18 RX ORDER — TORSEMIDE 20 MG/1
20 TABLET ORAL DAILY
Qty: 90 TABLET | Refills: 1 | Status: SHIPPED | OUTPATIENT
Start: 2025-02-18 | End: 2025-08-17

## 2025-02-18 RX ORDER — POTASSIUM CHLORIDE 750 MG/1
10 TABLET, FILM COATED, EXTENDED RELEASE ORAL DAILY
Qty: 90 TABLET | Refills: 1 | Status: SHIPPED | OUTPATIENT
Start: 2025-02-18

## 2025-02-18 NOTE — PROGRESS NOTES
"Chief Complaint:   Follow-up (3 month)     History Of Present Illness:    Morro Seo is a 59 y.o. male presenting for annual follow-up.     He has a history of type 2 diabetes mellitus, sleep apnea, smoking and obesity, complete heart block status post permanent pacer in 2020.  Lyme studies were negative.  Echo showed structurally normal heart, but Moderate concentric LVH. He had a pharmacological stress MPI in 2020 showing normal perfusion and normal LV function.  Diagnosed with polycythemia and now seeing a hematologist, periodic phlebotomy being done..   More recently has been diagnosed of chronic diastolic heart failure and started on diuretics with symptom improvement.  Also has sleep apnea on CPAP, obesity and PCP ordered CT calcium score recently shows elevated calcium score of 1300 or more.  This was followed by a stress test ordered by our [facility] that he was unable to complete.  We then ordered a dobutamine stress echo which was abnormal leading to cardiac cath in 2024 showing mild nonobstructive coronary artery disease.  He has quit smoking sometime in the last 1 year.  Does not have chest pain, shortness of breath, palpitations, lightheadedness, ankle swelling. EKG showed atrial sensed with ventricular paced rhythm. Reviewed pacemaker interrogation report from January 2025 normally functioning pacer-with short bursts of tachycardia.      He tells me his sleep apnea is well controlled on CPAP.         His echo from earlier in 2023 shows normal LV function but dilated RV and ongoing moderate concentric LVH.  Repeat echo with strain was ordered but has not been completed.  Overall from symptom perspective he feels really well.  His diabetes numbers like hemoglobin A1c are still uncontrolled.  Following up with PCP for that.   ..     Last Recorded Vitals:  Vitals:    02/18/25 1249   BP: 100/62   BP Location: Left arm   Pulse: 83   Weight: 136 kg (300 lb)   Height: 1.753 m (5' 9\")       Past Medical " History:  He has a past medical history of Complete heart block (10/31/2023), Diverticulosis of intestine, part unspecified, without perforation or abscess without bleeding (01/30/2020), Personal history of other endocrine, nutritional and metabolic disease (01/30/2020), Personal history of other endocrine, nutritional and metabolic disease (01/30/2020), Personal history of other specified conditions (05/04/2018), Personal history of pneumonia (recurrent) (05/08/2018), Solitary pulmonary nodule (05/04/2018), and Unspecified open wound of unspecified toe(s) without damage to nail, sequela (08/11/2020).    Past Surgical History:  He has a past surgical history that includes Other surgical history (04/28/2020); Other surgical history (04/28/2020); Other surgical history (04/28/2020); Other surgical history (04/28/2020); Other surgical history (07/23/2020); and Cardiac catheterization (N/A, 10/24/2024).      Social History:  He reports that he has quit smoking. His smoking use included cigarettes. He has never used smokeless tobacco. He reports that he does not currently use alcohol. He reports that he does not use drugs.    Family History:  Family History   Problem Relation Name Age of Onset    Heart attack Mother      Hypertension Mother      Coronary artery disease Father      Heart attack Father      Hypertension Father      Diabetes Sister      Diabetes Brother      Lung cancer Maternal Grandfather      Lung cancer Paternal Grandfather          Allergies:  Jardiance [empagliflozin], Penicillins, and Sulfamethoxazole-trimethoprim    Outpatient Medications:  Current Outpatient Medications   Medication Instructions    acarbose (Precose) 50 mg tablet TAKE 1 TABLET BY MOUTH 3 TIMES DAILY (MORNING, MIDDAY, LATE AFTERNOON).    albuterol 90 mcg/actuation inhaler 2 puffs, inhalation, Every 4 hours PRN    ammonium lactate (Lac-Hydrin) 12 % lotion USE 1 APPLICATION TO THE AFFECTED AREA EVERY NIGHT    aspirin 81 mg, Daily     atorvastatin (LIPITOR) 80 mg, oral, Daily    blood sugar diagnostic (Blood Glucose Test) strip Test once daily in the morning    blood-glucose meter misc Use to test blood sugars once daily    budesonide-formoteroL (Symbicort) 160-4.5 mcg/actuation inhaler 2 puffs, inhalation, 2 times daily RT, Rinse mouth with water after use to reduce aftertaste and incidence of candidiasis. Do not swallow.    buPROPion SR (WELLBUTRIN SR) 150 mg, oral, 2 times daily, Do not crush, chew, or split.    dapagliflozin propanediol (FARXIGA) 10 mg, oral, Daily    dulaglutide (TRULICITY) 3 mg, subcutaneous, Weekly    FreeStyle Aliza 3 Sensor device Use for continuous glucose monitoring. Change every 14 days.    gabapentin (NEURONTIN) 100 mg, oral, 2 times daily    ipratropium-albuteroL (Duo-Neb) 0.5-2.5 mg/3 mL nebulizer solution 3 mL, nebulization, Every 4 hours PRN    lancets misc Test once daily in the morning    losartan (COZAAR) 25 mg, oral, Daily    metFORMIN (GLUCOPHAGE) 1,000 mg, oral, 2 times daily (morning and late afternoon)    omeprazole (PRILOSEC) 40 mg, oral, Daily before breakfast, Do not crush or chew.    OneTouch Delica Plus Lancet 30 gauge misc TEST ONCE DAILY IN THE MORNING    potassium chloride CR 10 mEq ER tablet 10 mEq, oral, Daily    spironolactone (ALDACTONE) 25 mg, oral, Daily    tiotropium (Spiriva Respimat) 2.5 mcg/actuation inhaler 2 puffs, inhalation, Daily    torsemide (DEMADEX) 20 mg, oral, Daily, as directed       Physical Exam:  Physical Exam  Vitals reviewed.   Constitutional:       Appearance: Normal appearance.   Neck:      Vascular: No carotid bruit or JVD.   Cardiovascular:      Rate and Rhythm: Normal rate and regular rhythm.      Heart sounds: Normal heart sounds, S1 normal and S2 normal. No murmur heard.  Pulmonary:      Effort: Pulmonary effort is normal.      Breath sounds: Normal breath sounds.   Abdominal:      General: Abdomen is flat. Bowel sounds are normal.      Palpations: Abdomen is soft.    Musculoskeletal:      Right lower leg: No edema.      Left lower leg: No edema.   Skin:     General: Skin is warm.   Neurological:      Mental Status: He is alert. Mental status is at baseline.   Psychiatric:         Mood and Affect: Mood normal.         Behavior: Behavior normal.           Last Labs:  CBC -  Lab Results   Component Value Date    WBC 7.6 10/01/2024    HGB 16.8 10/01/2024    HCT 51.2 10/01/2024    MCV 93 10/01/2024     10/01/2024       CMP -  Lab Results   Component Value Date    CALCIUM 9.2 12/04/2024    PROT 6.7 12/04/2024    ALBUMIN 4.4 12/04/2024    AST 18 12/04/2024    ALT 22 12/04/2024    ALKPHOS 90 12/04/2024    BILITOT 1.4 (H) 12/04/2024       LIPID PANEL -   Lab Results   Component Value Date    CHOL 101 12/04/2024    TRIG 150 (H) 12/04/2024    HDL 34.5 12/04/2024    CHHDL 2.9 12/04/2024    LDLF 30 03/28/2022    VLDL 30 12/04/2024    NHDL 67 12/04/2024       RENAL FUNCTION PANEL -   Lab Results   Component Value Date    GLUCOSE 148 (H) 12/04/2024     12/04/2024    K 4.7 12/04/2024    CL 97 (L) 12/04/2024    CO2 33 (H) 12/04/2024    ANIONGAP 13 12/04/2024    BUN 23 12/04/2024    CREATININE 0.94 12/04/2024    GFRMALE >90 09/18/2023    CALCIUM 9.2 12/04/2024    ALBUMIN 4.4 12/04/2024        Lab Results   Component Value Date    BNP 11 08/04/2023    HGBA1C 10.2 (H) 12/04/2024    HGBA1C 9.0 08/21/2024       Last Cardiology Tests:  ECG:  ECG 12 Lead 09/12/2024      Echo:  Transthoracic echo (TTE) complete 11/01/2024      Ejection Fractions:  EF   Date/Time Value Ref Range Status   11/01/2024 09:40 AM 48 %        Cath:  Cardiac Catheterization Procedure 10/24/2024      Stress Test:  No results found for this or any previous visit from the past 1095 days.      Cardiac Imaging:  No results found for this or any previous visit from the past 1095 days.          Assessment/Plan   1-He has mild to moderate concentric LVH which is ongoing despite treatment of heart failure sleep apnea and  blood pressure. May have infiltrative cardiomyopathy although obesity could be contributing as well.  Normal ferritin levels. Prior to visit next year we will obtain an echo with strain imaging.  Will plan on getting PYP scan and amyloid studies at that point, if echo is abnormal.     2-elevated CT calcium score-dobutamine stress echo was abnormal but cardiac cath showed mild nonobstructive coronary artery disease.  Continue statins.  Lipid profile is favorable.    3-hypertension-blood pressure currently well-controlled continue current regimen.     4-tobacco use-quit in 2024.    5-chronic diastolic heart failure-well compensated continue current management.   Patient was allergic to Jardiance with rash.  However no such issues with Farxiga.  Continue Farxiga, torsemide and Aldactone.     6-presence of permanent pacer-follow through pacer clinic.  Functioning well.    6-month follow-up with [facility] for electrolyte monitoring and clinical monitoring.'s 1 year follow-up with me.  Will reevaluate earlier if needed.  I congratulated him on smoking cessation.         Orquidea Núñez MD

## 2025-02-19 RX ORDER — IPRATROPIUM BROMIDE AND ALBUTEROL SULFATE 2.5; .5 MG/3ML; MG/3ML
3 SOLUTION RESPIRATORY (INHALATION) EVERY 4 HOURS PRN
Qty: 360 ML | Refills: 11 | Status: SHIPPED | OUTPATIENT
Start: 2025-02-19

## 2025-02-19 RX ORDER — BUDESONIDE AND FORMOTEROL FUMARATE DIHYDRATE 160; 4.5 UG/1; UG/1
2 AEROSOL RESPIRATORY (INHALATION)
Qty: 10.2 G | Refills: 11 | Status: SHIPPED | OUTPATIENT
Start: 2025-02-19

## 2025-02-19 RX ORDER — TIOTROPIUM BROMIDE INHALATION SPRAY 3.12 UG/1
2 SPRAY, METERED RESPIRATORY (INHALATION) DAILY
Qty: 4 G | Refills: 11 | Status: SHIPPED | OUTPATIENT
Start: 2025-02-19

## 2025-02-20 ENCOUNTER — APPOINTMENT (OUTPATIENT)
Dept: PRIMARY CARE | Facility: CLINIC | Age: 60
End: 2025-02-20
Payer: COMMERCIAL

## 2025-02-21 LAB
ATRIAL RATE: 83 BPM
P AXIS: 54 DEGREES
P OFFSET: 151 MS
P ONSET: 86 MS
PR INTERVAL: 174 MS
Q ONSET: 173 MS
QRS COUNT: 13 BEATS
QRS DURATION: 186 MS
QT INTERVAL: 466 MS
QTC CALCULATION(BAZETT): 547 MS
QTC FREDERICIA: 519 MS
R AXIS: 252 DEGREES
T AXIS: 56 DEGREES
T OFFSET: 406 MS
VENTRICULAR RATE: 83 BPM

## 2025-02-26 ENCOUNTER — APPOINTMENT (OUTPATIENT)
Dept: PHARMACY | Facility: HOSPITAL | Age: 60
End: 2025-02-26
Payer: MEDICARE

## 2025-02-26 DIAGNOSIS — E11.69 TYPE 2 DIABETES MELLITUS WITH OTHER SPECIFIED COMPLICATION, WITHOUT LONG-TERM CURRENT USE OF INSULIN: ICD-10-CM

## 2025-02-26 NOTE — PROGRESS NOTES
"  Clinical Pharmacy Appointment    Patient ID: Morro Seo is a 59 y.o. male who presents for Diabetes    Pt is here for Follow Up.     Referring Provider: Lisseth Thomas DO  PCP: Lisseth Thomas DO   Last visit with PCP: 24  Next visit with PCP: 3/12/25    Cardiology: Dr. Núñez, 25  Pulmonology: Dr. Brandon, 25    Subjective     Interval History  Trulicity dose increase    HPI  Type II Diabetes  Current  Pharmacotherapy:   Acarbose 50 mg three times daily with meals  Metformin IR 1000 mg twice daily  Trulicity 3 mg once weekly  Farxiga 10 mg daily     SECONDARY PREVENTION  - Statin? Atorvastatin 80 mg   LDL: 16   TC: 76  - ACE-I/ARB? No   UACR: 55.6 (2021)   BP: 114/60  - Aspirin? Yes    -The ASCVD Risk score (Belem GARCIA, et al., 2019) failed to calculate for the following reasons:    Risk score cannot be calculated because patient has a medical history suggesting prior/existing ASCVD      Current monitoring regimen:   Patient is using: finger sticks    SMB  159  182  237    Lowest: 149    Hypoglycemia: No    Pertinent PMH Review:  - PMH of Pancreatitis: n/a  - PMH/FH of Medullary Thyroid Cancer: n/a  - PMH of Retinopathy: n/a      Lifestyle Update:  Not eating as much pasta and bread as previous. Potatoes/pasta/bread is what \"kills him\" not sweets per report    His wife broke her hip so he is doing a little more activity than usual.    Drug Interactions  No relevant drug interactions were noted.    Objective   Allergies   Allergen Reactions    Jardiance [Empagliflozin] Rash    Penicillins Unknown     Unknown reaction as child    Sulfamethoxazole-Trimethoprim Rash     Social History     Social History Narrative    Not on file      Medication Review  Current Outpatient Medications   Medication Instructions    acarbose (Precose) 50 mg tablet TAKE 1 TABLET BY MOUTH 3 TIMES DAILY (MORNING, MIDDAY, LATE AFTERNOON).    albuterol 90 mcg/actuation inhaler 2 puffs, inhalation, Every " 4 hours PRN    ammonium lactate (Lac-Hydrin) 12 % lotion USE 1 APPLICATION TO THE AFFECTED AREA EVERY NIGHT    atorvastatin (LIPITOR) 80 mg, oral, Daily    blood sugar diagnostic (Blood Glucose Test) strip Test once daily in the morning    blood-glucose meter misc Use to test blood sugars once daily    budesonide-formoteroL (Symbicort) 160-4.5 mcg/actuation inhaler 2 puffs, inhalation, 2 times daily RT, Rinse mouth with water after use to reduce aftertaste and incidence of candidiasis. Do not swallow.    buPROPion SR (WELLBUTRIN SR) 150 mg, oral, 2 times daily, Do not crush, chew, or split.    dapagliflozin propanediol (FARXIGA) 10 mg, oral, Daily    dulaglutide (TRULICITY) 3 mg, subcutaneous, Weekly    FreeStyle Aliza 3 Sensor device Use for continuous glucose monitoring. Change every 14 days.    gabapentin (NEURONTIN) 100 mg, oral, 2 times daily    ipratropium-albuteroL (Duo-Neb) 0.5-2.5 mg/3 mL nebulizer solution 3 mL, nebulization, Every 4 hours PRN    lancets misc Test once daily in the morning    losartan (COZAAR) 25 mg, oral, Daily    metFORMIN (GLUCOPHAGE) 1,000 mg, oral, 2 times daily (morning and late afternoon)    omeprazole (PRILOSEC) 40 mg, oral, Daily before breakfast, Do not crush or chew.    OneTouch Delica Plus Lancet 30 gauge misc TEST ONCE DAILY IN THE MORNING    potassium chloride CR 10 mEq ER tablet 10 mEq, oral, Daily    spironolactone (ALDACTONE) 25 mg, oral, Daily    tiotropium (Spiriva Respimat) 2.5 mcg/actuation inhaler 2 puffs, inhalation, Daily    torsemide (DEMADEX) 20 mg, oral, Daily, as directed      Vitals  BP Readings from Last 2 Encounters:   02/18/25 100/62   11/13/24 132/74     BMI Readings from Last 1 Encounters:   02/18/25 44.30 kg/m²      Labs  A1C  Lab Results   Component Value Date    HGBA1C 10.2 (H) 12/04/2024    HGBA1C 9.0 08/21/2024    HGBA1C 10.5 (H) 07/03/2024     BMP  Lab Results   Component Value Date    CALCIUM 9.2 12/04/2024     12/04/2024    K 4.7 12/04/2024     CO2 33 (H) 12/04/2024    CL 97 (L) 12/04/2024    BUN 23 12/04/2024    CREATININE 0.94 12/04/2024    EGFR >90 12/04/2024     LFTs  Lab Results   Component Value Date    ALT 22 12/04/2024    AST 18 12/04/2024    ALKPHOS 90 12/04/2024    BILITOT 1.4 (H) 12/04/2024     FLP  Lab Results   Component Value Date    TRIG 150 (H) 12/04/2024    CHOL 101 12/04/2024    LDLF 30 03/28/2022    LDLCALC 37 12/04/2024    HDL 34.5 12/04/2024     Urine Microalbumin  Lab Results   Component Value Date    MICROALBCREA 37.2 (H) 04/26/2021     Weight Management  Wt Readings from Last 3 Encounters:   02/18/25 136 kg (300 lb)   11/13/24 133 kg (294 lb)   11/06/24 135 kg (297 lb 6.4 oz)      There is no height or weight on file to calculate BMI.       Assessment/Plan   Problem List Items Addressed This Visit       Type 2 diabetes mellitus, without long-term current use of insulin (Multi)     Patient's diabetes is currenty uncontrolled, as shown by A1c of 10.2% (goal < 7%).     Blood sugars have been up and down with Trulicity titration. He starts having symptoms of hypoglycemia around 140. We discuss the mechanism behind this. He does note that with each dose increase, he sees fatigue initially x 24 hours after the injection. Has also seen mild diarrhea. With this, we discuss continuing current dose for an additional month.     He is due for a new A1c and UACR. Will order and patient notified.     Plan:  CONTINUE Trulicity 3 mg weekly  CONTINUE Farxiga, acarbose, metformin          Relevant Medications    dulaglutide (Trulicity) 3 mg/0.5 mL injection    Other Relevant Orders    Hemoglobin A1c    Albumin-Creatinine Ratio, Urine Random    Referral to Clinical Pharmacy       Clinical Pharmacist follow-up: 3/26 @ 10:40 AM, Telehealth visit    Continue all meds under the continuation of care with the referring provider and clinical pharmacy team.    Thank you,  Ricarda Wong, PharmD  Clinical Pharmacy Specialist     Verbal consent to manage  patient's drug therapy was obtained from the patient. They were informed they may decline to participate or withdraw from participation in pharmacy services at any time.Fitting CGM

## 2025-02-26 NOTE — ASSESSMENT & PLAN NOTE
Patient's diabetes is currenty uncontrolled, as shown by A1c of 10.2% (goal < 7%).     Blood sugars have been up and down with Trulicity titration. He starts having symptoms of hypoglycemia around 140. We discuss the mechanism behind this. He does note that with each dose increase, he sees fatigue initially x 24 hours after the injection. Has also seen mild diarrhea. With this, we discuss continuing current dose for an additional month.     He is due for a new A1c and UACR. Will order and patient notified.     Plan:  CONTINUE Trulicity 3 mg weekly  CONTINUE Farxiga, acarbose, metformin

## 2025-02-27 DIAGNOSIS — J44.9 CHRONIC OBSTRUCTIVE PULMONARY DISEASE, UNSPECIFIED COPD TYPE (MULTI): Primary | ICD-10-CM

## 2025-02-27 RX ORDER — BUDESONIDE, GLYCOPYRROLATE, AND FORMOTEROL FUMARATE 160; 9; 4.8 UG/1; UG/1; UG/1
2 AEROSOL, METERED RESPIRATORY (INHALATION)
Qty: 10.7 G | Refills: 11 | Status: SHIPPED | OUTPATIENT
Start: 2025-02-27

## 2025-03-04 ENCOUNTER — TELEPHONE (OUTPATIENT)
Dept: PULMONOLOGY | Facility: HOSPITAL | Age: 60
End: 2025-03-04
Payer: MEDICARE

## 2025-03-04 DIAGNOSIS — J44.9 CHRONIC OBSTRUCTIVE PULMONARY DISEASE, UNSPECIFIED COPD TYPE (MULTI): ICD-10-CM

## 2025-03-04 RX ORDER — BUDESONIDE AND FORMOTEROL FUMARATE DIHYDRATE 160; 4.5 UG/1; UG/1
2 AEROSOL RESPIRATORY (INHALATION)
Qty: 10.2 G | Refills: 11 | Status: SHIPPED | OUTPATIENT
Start: 2025-03-04 | End: 2025-03-14 | Stop reason: SDUPTHER

## 2025-03-04 RX ORDER — TIOTROPIUM BROMIDE INHALATION SPRAY 3.12 UG/1
2 SPRAY, METERED RESPIRATORY (INHALATION) DAILY
Qty: 4 G | Refills: 11 | Status: SHIPPED | OUTPATIENT
Start: 2025-03-04 | End: 2025-03-14 | Stop reason: SDUPTHER

## 2025-03-04 NOTE — TELEPHONE ENCOUNTER
Patient acknowledged understanding. All questions answered at this time.     ----- Message from Terri Lucas sent at 3/4/2025  2:23 PM EST -----  Regarding: RE: Inhaler  I sent back Symbicort and Spiriva.  ----- Message -----  From: Lacy Cifuentes MA  Sent: 3/3/2025   2:57 PM EST  To: REJI Lugo  Subject: RE: Inhaler                                      Patients insurance denied Breztri again, they will not cover any inhalers in this drug class.  ----- Message -----  From: REJI Lugo  Sent: 2/27/2025   3:09 PM EST  To: Lacy Cifuentes MA  Subject: RE: Inhaler                                      done  ----- Message -----  From: Lacy Cifuentes MA  Sent: 2/27/2025   3:00 PM EST  To: REJI Lugo  Subject: Inhaler                                          Patient called in requesting we try to send the Breztri over again, he got new insurance and is hoping it will not be covered

## 2025-03-05 DIAGNOSIS — D75.1 ERYTHROCYTOSIS: Primary | ICD-10-CM

## 2025-03-06 LAB
ALBUMIN/CREAT UR: 17 MG/G CREAT
CREAT UR-MCNC: 52 MG/DL (ref 20–320)
EST. AVERAGE GLUCOSE BLD GHB EST-MCNC: 255 MG/DL
EST. AVERAGE GLUCOSE BLD GHB EST-SCNC: 14.1 MMOL/L
HBA1C MFR BLD: 10.5 % OF TOTAL HGB
MICROALBUMIN UR-MCNC: 0.9 MG/DL

## 2025-03-11 ENCOUNTER — APPOINTMENT (OUTPATIENT)
Dept: CARDIOLOGY | Facility: HOSPITAL | Age: 60
End: 2025-03-11
Payer: MEDICARE

## 2025-03-12 ENCOUNTER — APPOINTMENT (OUTPATIENT)
Dept: PRIMARY CARE | Facility: CLINIC | Age: 60
End: 2025-03-12
Payer: COMMERCIAL

## 2025-03-12 VITALS
OXYGEN SATURATION: 96 % | TEMPERATURE: 97.2 F | HEART RATE: 73 BPM | DIASTOLIC BLOOD PRESSURE: 82 MMHG | WEIGHT: 302 LBS | SYSTOLIC BLOOD PRESSURE: 126 MMHG | BODY MASS INDEX: 44.6 KG/M2

## 2025-03-12 DIAGNOSIS — F33.0 MILD EPISODE OF RECURRENT MAJOR DEPRESSIVE DISORDER (CMS-HCC): ICD-10-CM

## 2025-03-12 DIAGNOSIS — E11.49 OTHER DIABETIC NEUROLOGICAL COMPLICATION ASSOCIATED WITH TYPE 2 DIABETES MELLITUS: ICD-10-CM

## 2025-03-12 DIAGNOSIS — K92.0 HEMATEMESIS WITH NAUSEA: ICD-10-CM

## 2025-03-12 DIAGNOSIS — E11.69 TYPE 2 DIABETES MELLITUS WITH OTHER SPECIFIED COMPLICATION, WITHOUT LONG-TERM CURRENT USE OF INSULIN: ICD-10-CM

## 2025-03-12 DIAGNOSIS — E11.65 TYPE 2 DIABETES MELLITUS WITH HYPERGLYCEMIA, WITHOUT LONG-TERM CURRENT USE OF INSULIN: Primary | ICD-10-CM

## 2025-03-12 DIAGNOSIS — J43.9 PULMONARY EMPHYSEMA, UNSPECIFIED EMPHYSEMA TYPE (MULTI): ICD-10-CM

## 2025-03-12 DIAGNOSIS — E78.5 HYPERLIPIDEMIA, UNSPECIFIED HYPERLIPIDEMIA TYPE: ICD-10-CM

## 2025-03-12 DIAGNOSIS — K76.0 FATTY LIVER: ICD-10-CM

## 2025-03-12 PROCEDURE — 3074F SYST BP LT 130 MM HG: CPT | Performed by: FAMILY MEDICINE

## 2025-03-12 PROCEDURE — G2211 COMPLEX E/M VISIT ADD ON: HCPCS | Performed by: FAMILY MEDICINE

## 2025-03-12 PROCEDURE — 3079F DIAST BP 80-89 MM HG: CPT | Performed by: FAMILY MEDICINE

## 2025-03-12 PROCEDURE — 1036F TOBACCO NON-USER: CPT | Performed by: FAMILY MEDICINE

## 2025-03-12 PROCEDURE — 99214 OFFICE O/P EST MOD 30 MIN: CPT | Performed by: FAMILY MEDICINE

## 2025-03-12 PROCEDURE — 4010F ACE/ARB THERAPY RXD/TAKEN: CPT | Performed by: FAMILY MEDICINE

## 2025-03-12 RX ORDER — OMEPRAZOLE 40 MG/1
40 CAPSULE, DELAYED RELEASE ORAL
Qty: 90 CAPSULE | Refills: 0 | Status: SHIPPED | OUTPATIENT
Start: 2025-03-12 | End: 2025-03-14 | Stop reason: SDUPTHER

## 2025-03-12 RX ORDER — OMEPRAZOLE 40 MG/1
40 CAPSULE, DELAYED RELEASE ORAL
Qty: 90 CAPSULE | Refills: 0 | Status: SHIPPED | OUTPATIENT
Start: 2025-03-12 | End: 2025-03-12

## 2025-03-12 RX ORDER — GLIPIZIDE 5 MG/1
5 TABLET ORAL
Qty: 180 TABLET | Refills: 0 | Status: SHIPPED | OUTPATIENT
Start: 2025-03-12 | End: 2025-03-14 | Stop reason: SDUPTHER

## 2025-03-12 RX ORDER — METFORMIN HYDROCHLORIDE 1000 MG/1
1000 TABLET ORAL
Qty: 180 TABLET | Refills: 0 | Status: SHIPPED | OUTPATIENT
Start: 2025-03-12 | End: 2025-03-14 | Stop reason: SDUPTHER

## 2025-03-12 RX ORDER — BUPROPION HYDROCHLORIDE 150 MG/1
150 TABLET, EXTENDED RELEASE ORAL 2 TIMES DAILY
Qty: 180 TABLET | Refills: 0 | Status: SHIPPED | OUTPATIENT
Start: 2025-03-12 | End: 2025-03-14 | Stop reason: SDUPTHER

## 2025-03-12 RX ORDER — GABAPENTIN 100 MG/1
100 CAPSULE ORAL 2 TIMES DAILY
Qty: 180 CAPSULE | Refills: 0 | Status: SHIPPED | OUTPATIENT
Start: 2025-03-12 | End: 2025-03-14 | Stop reason: SDUPTHER

## 2025-03-12 ASSESSMENT — PATIENT HEALTH QUESTIONNAIRE - PHQ9
1. LITTLE INTEREST OR PLEASURE IN DOING THINGS: NOT AT ALL
SUM OF ALL RESPONSES TO PHQ9 QUESTIONS 1 AND 2: 0
2. FEELING DOWN, DEPRESSED OR HOPELESS: NOT AT ALL

## 2025-03-12 ASSESSMENT — ENCOUNTER SYMPTOMS
VOMITING: 0
DYSPHORIC MOOD: 0
CONSTIPATION: 0
HEADACHES: 0
PALPITATIONS: 0
FATIGUE: 0
BLOOD IN STOOL: 0
ABDOMINAL PAIN: 0
SHORTNESS OF BREATH: 0
DIARRHEA: 0
POLYDIPSIA: 0
SLEEP DISTURBANCE: 0
DIZZINESS: 0
MYALGIAS: 0
POLYPHAGIA: 0
HYPERTENSION: 1
NAUSEA: 0

## 2025-03-12 NOTE — PATIENT INSTRUCTIONS
Recommend a predominant low fat whole foods plant based diet.  Cut back on meat, dairy, processed carbs, salt and oils(especially palm and coconut). Increase fiber in your diet.  Decrease alcohol as much as possible if you drink. Recommend regular exercise most days of the week(goal up to 150min per week). Also recommend good sleep habits aiming for 7-8 hours per night.     Follow up with your specialists as scheduled    Restart glipizide    You were referred to endocrinology    Continue your other meds    Return in 3 months, sooner if needed

## 2025-03-12 NOTE — PROGRESS NOTES
Subjective   Patient ID: Morro Seo is a 59 y.o. male who presents for Anxiety, Hypertension, and Hyperlipidemia (Review BW) and multiple issues    Anxiety  Patient reports no chest pain, dizziness, nausea, palpitations or shortness of breath.       Hypertension  Associated symptoms include anxiety. Pertinent negatives include no chest pain, headaches, palpitations or shortness of breath.   Hyperlipidemia  Pertinent negatives include no chest pain, myalgias or shortness of breath.      DM: worsening. A1c 10.5(10.2). did well on glipizide in past. Wants to restart  Lipids: have been stable.   HTN: controlled  Emphysema: stable. Breathing improved since quit smoking  Mood: controlled.   Neuropathy: stable on jose antonio.   Hematemesis: no recurrence. Never followed up with EGD.   BMI: high. Wt trending up.   Fatty liver: seen on prior US. Occ drinks etoh.     Review of Systems   Constitutional:  Negative for fatigue.   Eyes:  Negative for visual disturbance.   Respiratory:  Negative for shortness of breath.    Cardiovascular:  Negative for chest pain and palpitations.   Gastrointestinal:  Negative for abdominal pain, blood in stool, constipation, diarrhea, nausea and vomiting.   Endocrine: Negative for cold intolerance, heat intolerance, polydipsia, polyphagia and polyuria.   Musculoskeletal:  Negative for myalgias.   Skin:  Negative for rash.   Neurological:  Negative for dizziness and headaches.   Psychiatric/Behavioral:  Negative for dysphoric mood and sleep disturbance.        Objective   /82   Pulse 73   Temp 36.2 °C (97.2 °F)   Wt 137 kg (302 lb)   SpO2 96%   BMI 44.60 kg/m²     Physical Exam  Vitals and nursing note reviewed.   Constitutional:       General: He is not in acute distress.     Appearance: Normal appearance. He is not toxic-appearing.   HENT:      Head: Normocephalic.      Mouth/Throat:      Pharynx: Oropharynx is clear.   Eyes:      General: No scleral icterus.     Pupils: Pupils are  equal, round, and reactive to light.   Neck:      Vascular: No carotid bruit.   Cardiovascular:      Rate and Rhythm: Normal rate and regular rhythm.      Pulses: Normal pulses.      Heart sounds: No murmur heard.  Pulmonary:      Effort: Pulmonary effort is normal. No respiratory distress.      Breath sounds: Normal breath sounds.   Abdominal:      Palpations: Abdomen is soft.      Tenderness: There is no abdominal tenderness. There is no guarding.   Musculoskeletal:         General: No tenderness.      Right lower leg: No edema.      Left lower leg: No edema.   Skin:     General: Skin is warm.   Neurological:      General: No focal deficit present.      Mental Status: He is alert.      Cranial Nerves: No cranial nerve deficit.   Psychiatric:         Mood and Affect: Mood normal.         Assessment/Plan   Problem List Items Addressed This Visit             ICD-10-CM    Hyperlipidemia E78.5    Relevant Orders    Comprehensive Metabolic Panel    Follow Up In Primary Care    Type 2 diabetes mellitus, without long-term current use of insulin (Multi) - Primary E11.9    Relevant Medications    glipiZIDE (Glucotrol) 5 mg tablet    metFORMIN (Glucophage) 1,000 mg tablet    Other Relevant Orders    Referral to Endocrinology    Comprehensive Metabolic Panel    Hemoglobin A1C    Follow Up In Primary Care    Follow Up In Primary Care    COPD (chronic obstructive pulmonary disease) (Multi) J44.9    Depression F32.A    Relevant Medications    buPROPion SR (Wellbutrin SR) 150 mg 12 hr tablet    Hematemesis K92.0    Relevant Medications    omeprazole (PriLOSEC) 40 mg DR capsule     Other Visit Diagnoses         Codes    Body mass index (BMI) 40.0-44.9, adult (Multi)     Z68.41    Other diabetic neurological complication associated with type 2 diabetes mellitus     E11.49    Relevant Medications    gabapentin (Neurontin) 100 mg capsule    Fatty liver     K76.0          Discussed bw and prior imaging. Discussed side effects of meds.  Recommendations given.  Stressed the importance of follow-up with GI for EGD.  Patient requesting Celebrex.  Discussed he will be considered if his EGD normal

## 2025-03-14 ENCOUNTER — TELEPHONE (OUTPATIENT)
Dept: PRIMARY CARE | Facility: CLINIC | Age: 60
End: 2025-03-14
Payer: MEDICARE

## 2025-03-14 ENCOUNTER — TELEPHONE (OUTPATIENT)
Dept: CARDIOLOGY | Facility: HOSPITAL | Age: 60
End: 2025-03-14
Payer: MEDICARE

## 2025-03-14 DIAGNOSIS — G47.33 OBSTRUCTIVE SLEEP APNEA: ICD-10-CM

## 2025-03-14 DIAGNOSIS — J44.9 CHRONIC OBSTRUCTIVE PULMONARY DISEASE, UNSPECIFIED COPD TYPE (MULTI): ICD-10-CM

## 2025-03-14 DIAGNOSIS — E11.65 TYPE 2 DIABETES MELLITUS WITH HYPERGLYCEMIA, WITHOUT LONG-TERM CURRENT USE OF INSULIN: ICD-10-CM

## 2025-03-14 DIAGNOSIS — F33.0 MILD EPISODE OF RECURRENT MAJOR DEPRESSIVE DISORDER (CMS-HCC): ICD-10-CM

## 2025-03-14 DIAGNOSIS — K92.0 HEMATEMESIS WITH NAUSEA: ICD-10-CM

## 2025-03-14 DIAGNOSIS — I51.7 LEFT VENTRICULAR HYPERTROPHY: ICD-10-CM

## 2025-03-14 DIAGNOSIS — I10 ESSENTIAL HYPERTENSION: ICD-10-CM

## 2025-03-14 DIAGNOSIS — R60.0 LOCALIZED EDEMA: ICD-10-CM

## 2025-03-14 DIAGNOSIS — I51.9 LV DYSFUNCTION: ICD-10-CM

## 2025-03-14 DIAGNOSIS — E11.49 OTHER DIABETIC NEUROLOGICAL COMPLICATION ASSOCIATED WITH TYPE 2 DIABETES MELLITUS: ICD-10-CM

## 2025-03-14 DIAGNOSIS — E78.5 HYPERLIPIDEMIA, UNSPECIFIED HYPERLIPIDEMIA TYPE: ICD-10-CM

## 2025-03-14 DIAGNOSIS — E11.69 TYPE 2 DIABETES MELLITUS WITH OTHER SPECIFIED COMPLICATION, WITHOUT LONG-TERM CURRENT USE OF INSULIN: ICD-10-CM

## 2025-03-14 DIAGNOSIS — I50.32 CHRONIC DIASTOLIC (CONGESTIVE) HEART FAILURE: ICD-10-CM

## 2025-03-14 RX ORDER — BUDESONIDE AND FORMOTEROL FUMARATE DIHYDRATE 160; 4.5 UG/1; UG/1
2 AEROSOL RESPIRATORY (INHALATION)
Qty: 10.2 G | Refills: 1 | Status: SHIPPED | OUTPATIENT
Start: 2025-03-14

## 2025-03-14 RX ORDER — METFORMIN HYDROCHLORIDE 1000 MG/1
1000 TABLET ORAL
Qty: 180 TABLET | Refills: 0 | Status: SHIPPED | OUTPATIENT
Start: 2025-03-14

## 2025-03-14 RX ORDER — GABAPENTIN 100 MG/1
100 CAPSULE ORAL 2 TIMES DAILY
Qty: 180 CAPSULE | Refills: 0 | Status: SHIPPED | OUTPATIENT
Start: 2025-03-14 | End: 2025-06-12

## 2025-03-14 RX ORDER — POTASSIUM CHLORIDE 750 MG/1
10 TABLET, FILM COATED, EXTENDED RELEASE ORAL DAILY
Qty: 90 TABLET | Refills: 1 | Status: CANCELLED | OUTPATIENT
Start: 2025-03-14

## 2025-03-14 RX ORDER — LOSARTAN POTASSIUM 25 MG/1
25 TABLET ORAL DAILY
Qty: 30 TABLET | Refills: 11 | Status: CANCELLED | OUTPATIENT
Start: 2025-03-14 | End: 2026-03-14

## 2025-03-14 RX ORDER — DAPAGLIFLOZIN 10 MG/1
10 TABLET, FILM COATED ORAL DAILY
Qty: 30 TABLET | Refills: 11 | Status: CANCELLED | OUTPATIENT
Start: 2025-03-14 | End: 2026-03-14

## 2025-03-14 RX ORDER — GLIPIZIDE 5 MG/1
5 TABLET ORAL
Qty: 180 TABLET | Refills: 0 | Status: SHIPPED | OUTPATIENT
Start: 2025-03-14 | End: 2025-06-12

## 2025-03-14 RX ORDER — TIOTROPIUM BROMIDE INHALATION SPRAY 3.12 UG/1
2 SPRAY, METERED RESPIRATORY (INHALATION) DAILY
Qty: 12 G | Refills: 0 | Status: SHIPPED | OUTPATIENT
Start: 2025-03-14

## 2025-03-14 RX ORDER — SPIRONOLACTONE 25 MG/1
25 TABLET ORAL DAILY
Qty: 90 TABLET | Refills: 1 | Status: CANCELLED | OUTPATIENT
Start: 2025-03-14

## 2025-03-14 RX ORDER — ACARBOSE 50 MG/1
50 TABLET ORAL
Qty: 270 TABLET | Refills: 0 | Status: SHIPPED | OUTPATIENT
Start: 2025-03-14 | End: 2025-06-12

## 2025-03-14 RX ORDER — BUPROPION HYDROCHLORIDE 150 MG/1
150 TABLET, EXTENDED RELEASE ORAL 2 TIMES DAILY
Qty: 180 TABLET | Refills: 0 | Status: SHIPPED | OUTPATIENT
Start: 2025-03-14 | End: 2025-06-12

## 2025-03-14 RX ORDER — OMEPRAZOLE 40 MG/1
40 CAPSULE, DELAYED RELEASE ORAL
Qty: 90 CAPSULE | Refills: 0 | Status: SHIPPED | OUTPATIENT
Start: 2025-03-14 | End: 2025-06-12

## 2025-03-14 RX ORDER — ATORVASTATIN CALCIUM 80 MG/1
80 TABLET, FILM COATED ORAL DAILY
Qty: 90 TABLET | Refills: 1 | Status: CANCELLED | OUTPATIENT
Start: 2025-03-14

## 2025-03-14 RX ORDER — TORSEMIDE 20 MG/1
20 TABLET ORAL DAILY
Qty: 90 TABLET | Refills: 1 | Status: CANCELLED | OUTPATIENT
Start: 2025-03-14 | End: 2025-09-10

## 2025-03-14 NOTE — TELEPHONE ENCOUNTER
Next OV 6/19/25  Ok for 30 day on med pt out of?  Ok for 90 day of rest of meds?  Please advise. Thanks. JW

## 2025-03-14 NOTE — TELEPHONE ENCOUNTER
Patient called and stated that he changed insurance and they would like ALL medications to [facility].     They are requesting 90 day supply. Its cheaper this way then getting it a pharmacy

## 2025-03-14 NOTE — TELEPHONE ENCOUNTER
Pt called asking for his meds to be sent [facility] as 90 days except for the precose. He's asking for that to be sent as 30 day to [facility] since he's completely out

## 2025-03-20 DIAGNOSIS — E11.65 TYPE 2 DIABETES MELLITUS WITH HYPERGLYCEMIA, WITHOUT LONG-TERM CURRENT USE OF INSULIN: ICD-10-CM

## 2025-03-20 DIAGNOSIS — J44.9 CHRONIC OBSTRUCTIVE PULMONARY DISEASE, UNSPECIFIED COPD TYPE (MULTI): ICD-10-CM

## 2025-03-20 RX ORDER — IPRATROPIUM BROMIDE AND ALBUTEROL SULFATE 2.5; .5 MG/3ML; MG/3ML
3 SOLUTION RESPIRATORY (INHALATION) EVERY 4 HOURS PRN
Qty: 1080 ML | Refills: 3 | Status: SHIPPED | OUTPATIENT
Start: 2025-03-20

## 2025-03-20 RX ORDER — BUDESONIDE AND FORMOTEROL FUMARATE DIHYDRATE 160; 4.5 UG/1; UG/1
2 AEROSOL RESPIRATORY (INHALATION)
Qty: 30.6 G | Refills: 3 | Status: SHIPPED | OUTPATIENT
Start: 2025-03-20

## 2025-03-20 RX ORDER — TIOTROPIUM BROMIDE INHALATION SPRAY 3.12 UG/1
2 SPRAY, METERED RESPIRATORY (INHALATION) DAILY
Qty: 12 G | Refills: 3 | Status: SHIPPED | OUTPATIENT
Start: 2025-03-20

## 2025-03-20 RX ORDER — ALBUTEROL SULFATE 90 UG/1
2 INHALANT RESPIRATORY (INHALATION) EVERY 4 HOURS PRN
Qty: 54 G | Refills: 3 | Status: SHIPPED | OUTPATIENT
Start: 2025-03-20

## 2025-03-20 RX ORDER — ACARBOSE 50 MG/1
50 TABLET ORAL
Qty: 270 TABLET | Refills: 0 | Status: SHIPPED | OUTPATIENT
Start: 2025-03-20 | End: 2025-06-18

## 2025-03-20 RX ORDER — ACARBOSE 50 MG/1
TABLET ORAL
Refills: 0 | OUTPATIENT
Start: 2025-03-20

## 2025-03-20 NOTE — TELEPHONE ENCOUNTER
Medication Name: Precose (90 day supply)  Pharm[facility] Name[facility]  Last OV: 3/12  Next OV:  06/19

## 2025-03-24 ENCOUNTER — APPOINTMENT (OUTPATIENT)
Dept: HEMATOLOGY/ONCOLOGY | Facility: CLINIC | Age: 60
End: 2025-03-24
Payer: COMMERCIAL

## 2025-03-26 ENCOUNTER — APPOINTMENT (OUTPATIENT)
Dept: PHARMACY | Facility: HOSPITAL | Age: 60
End: 2025-03-26
Payer: MEDICARE

## 2025-03-26 DIAGNOSIS — E11.69 TYPE 2 DIABETES MELLITUS WITH OTHER SPECIFIED COMPLICATION, WITHOUT LONG-TERM CURRENT USE OF INSULIN: ICD-10-CM

## 2025-03-26 NOTE — PROGRESS NOTES
I reviewed the progress note and agree with the resident’s findings and plans as written. Case discussed with resident.    Ricarda Wong, PharmD

## 2025-03-26 NOTE — ASSESSMENT & PLAN NOTE
Patient's diabetes is currenty uncontrolled, as shown by A1c of 10.5% (goal < 7%). Slight increase from last A1c which was 10.2%.    Patient reports doing well on current regimen with no reported side effects. He reports blood sugars have been around 140-180 range. Discussed increasing Trulicity to target dose but he reports he would not be able to start it since he is in [place] at the moment. Will increase Trulicity dose next month.     Additionally, he mentions that his PCP recommended seeing an endocrinologist, and he stated that he will schedule an appointment soon.    Plan  CONTINUE Trulicity 3 mg weekly  CONTINUE Farxiga, acarbose, metformin

## 2025-04-08 ENCOUNTER — HOSPITAL ENCOUNTER (OUTPATIENT)
Dept: CARDIOLOGY | Facility: HOSPITAL | Age: 60
Discharge: HOME | End: 2025-04-08
Payer: MEDICARE

## 2025-04-08 DIAGNOSIS — I44.2 COMPLETE HEART BLOCK: ICD-10-CM

## 2025-04-08 DIAGNOSIS — Z95.0 PRESENCE OF CARDIAC PACEMAKER: ICD-10-CM

## 2025-04-08 PROCEDURE — 93296 REM INTERROG EVL PM/IDS: CPT

## 2025-04-14 DIAGNOSIS — F33.0 MILD EPISODE OF RECURRENT MAJOR DEPRESSIVE DISORDER (CMS-HCC): ICD-10-CM

## 2025-04-17 ENCOUNTER — HOSPITAL ENCOUNTER (OUTPATIENT)
Dept: CARDIOLOGY | Facility: HOSPITAL | Age: 60
Discharge: HOME | End: 2025-04-17
Payer: MEDICARE

## 2025-04-17 DIAGNOSIS — I50.32 CHRONIC DIASTOLIC (CONGESTIVE) HEART FAILURE: ICD-10-CM

## 2025-04-17 DIAGNOSIS — I10 ESSENTIAL HYPERTENSION: ICD-10-CM

## 2025-04-17 DIAGNOSIS — I51.9 LV DYSFUNCTION: ICD-10-CM

## 2025-04-17 DIAGNOSIS — R94.39: ICD-10-CM

## 2025-04-17 DIAGNOSIS — I51.7 LEFT VENTRICULAR HYPERTROPHY: ICD-10-CM

## 2025-04-17 DIAGNOSIS — Z95.0 STATUS CARDIAC PACEMAKER: ICD-10-CM

## 2025-04-17 DIAGNOSIS — R93.1 AGATSTON CORONARY ARTERY CALCIUM SCORE GREATER THAN 400: ICD-10-CM

## 2025-04-17 DIAGNOSIS — E78.5 HYPERLIPIDEMIA, UNSPECIFIED HYPERLIPIDEMIA TYPE: ICD-10-CM

## 2025-04-17 LAB
AORTIC VALVE MEAN GRADIENT: 9 MMHG
AORTIC VALVE PEAK VELOCITY: 1.96 M/S
AV PEAK GRADIENT: 15 MMHG
AVA (PEAK VEL): 2.03 CM2
AVA (VTI): 2.2 CM2
EJECTION FRACTION APICAL 4 CHAMBER: 57.7
EJECTION FRACTION: 50 %
LEFT ATRIUM VOLUME AREA LENGTH INDEX BSA: 19.5 ML/M2
LEFT VENTRICLE INTERNAL DIMENSION DIASTOLE: 6.19 CM (ref 3.5–6)
LEFT VENTRICULAR OUTFLOW TRACT DIAMETER: 1.98 CM
LV EJECTION FRACTION BIPLANE: 52 %
MITRAL VALVE E/A RATIO: 0.82
RIGHT VENTRICLE FREE WALL PEAK S': 16.46 CM/S
RIGHT VENTRICLE PEAK SYSTOLIC PRESSURE: 21.7 MMHG
TRICUSPID ANNULAR PLANE SYSTOLIC EXCURSION: 3.6 CM

## 2025-04-17 PROCEDURE — C8929 TTE W OR WO FOL WCON,DOPPLER: HCPCS

## 2025-04-17 PROCEDURE — 93306 TTE W/DOPPLER COMPLETE: CPT | Performed by: INTERNAL MEDICINE

## 2025-04-17 PROCEDURE — 2500000004 HC RX 250 GENERAL PHARMACY W/ HCPCS (ALT 636 FOR OP/ED): Performed by: INTERNAL MEDICINE

## 2025-04-17 RX ADMIN — HUMAN ALBUMIN MICROSPHERES AND PERFLUTREN 0.5 ML: 10; .22 INJECTION, SOLUTION INTRAVENOUS at 13:46

## 2025-04-17 NOTE — RESULT ENCOUNTER NOTE
Was ordered with strain imaging I do not see any strain report.  There is no LVH.    Concerned about the wall motion abnormality unless patient has developed new symptoms.  Normal cardiac health recently.  Has some degree of cardiomyopathy.

## 2025-04-18 RX ORDER — BUPROPION HYDROCHLORIDE 150 MG/1
150 TABLET, EXTENDED RELEASE ORAL 2 TIMES DAILY
Qty: 180 TABLET | Refills: 0 | OUTPATIENT
Start: 2025-04-18 | End: 2025-07-17

## 2025-04-21 ENCOUNTER — APPOINTMENT (OUTPATIENT)
Dept: HEMATOLOGY/ONCOLOGY | Facility: CLINIC | Age: 60
End: 2025-04-21
Payer: MEDICARE

## 2025-04-21 ENCOUNTER — OFFICE VISIT (OUTPATIENT)
Dept: HEMATOLOGY/ONCOLOGY | Facility: CLINIC | Age: 60
End: 2025-04-21
Payer: MEDICARE

## 2025-04-21 ENCOUNTER — TELEPHONE (OUTPATIENT)
Dept: CARDIOLOGY | Facility: HOSPITAL | Age: 60
End: 2025-04-21
Payer: MEDICARE

## 2025-04-21 VITALS
WEIGHT: 305.23 LBS | RESPIRATION RATE: 16 BRPM | OXYGEN SATURATION: 97 % | HEART RATE: 69 BPM | TEMPERATURE: 97.5 F | SYSTOLIC BLOOD PRESSURE: 121 MMHG | HEIGHT: 69 IN | DIASTOLIC BLOOD PRESSURE: 72 MMHG | BODY MASS INDEX: 45.21 KG/M2

## 2025-04-21 DIAGNOSIS — D75.1 ERYTHROCYTOSIS: ICD-10-CM

## 2025-04-21 LAB
ANION GAP SERPL CALC-SCNC: 9 MMOL/L (ref 10–20)
BASOPHILS # BLD AUTO: 0.02 X10*3/UL (ref 0–0.1)
BASOPHILS NFR BLD AUTO: 0.3 %
BUN SERPL-MCNC: 23 MG/DL (ref 6–23)
CALCIUM SERPL-MCNC: 9.4 MG/DL (ref 8.6–10.3)
CHLORIDE SERPL-SCNC: 100 MMOL/L (ref 98–107)
CO2 SERPL-SCNC: 30 MMOL/L (ref 21–32)
CREAT SERPL-MCNC: 0.95 MG/DL (ref 0.5–1.3)
EGFRCR SERPLBLD CKD-EPI 2021: >90 ML/MIN/1.73M*2
EOSINOPHIL # BLD AUTO: 0.15 X10*3/UL (ref 0–0.7)
EOSINOPHIL NFR BLD AUTO: 1.9 %
ERYTHROCYTE [DISTWIDTH] IN BLOOD BY AUTOMATED COUNT: 14.2 % (ref 11.5–14.5)
FERRITIN SERPL-MCNC: 150 NG/ML (ref 20–300)
GLUCOSE SERPL-MCNC: 202 MG/DL (ref 74–99)
HCT VFR BLD AUTO: 45.3 % (ref 41–52)
HGB BLD-MCNC: 15 G/DL (ref 13.5–17.5)
IMM GRANULOCYTES # BLD AUTO: 0.05 X10*3/UL (ref 0–0.7)
IMM GRANULOCYTES NFR BLD AUTO: 0.6 % (ref 0–0.9)
IRON SATN MFR SERPL: 22 % (ref 25–45)
IRON SERPL-MCNC: 69 UG/DL (ref 35–150)
LYMPHOCYTES # BLD AUTO: 1.24 X10*3/UL (ref 1.2–4.8)
LYMPHOCYTES NFR BLD AUTO: 15.6 %
MCH RBC QN AUTO: 29.5 PG (ref 26–34)
MCHC RBC AUTO-ENTMCNC: 33.1 G/DL (ref 32–36)
MCV RBC AUTO: 89 FL (ref 80–100)
MONOCYTES # BLD AUTO: 0.7 X10*3/UL (ref 0.1–1)
MONOCYTES NFR BLD AUTO: 8.8 %
NEUTROPHILS # BLD AUTO: 5.78 X10*3/UL (ref 1.2–7.7)
NEUTROPHILS NFR BLD AUTO: 72.8 %
PLATELET # BLD AUTO: 191 X10*3/UL (ref 150–450)
POTASSIUM SERPL-SCNC: 4.2 MMOL/L (ref 3.5–5.3)
RBC # BLD AUTO: 5.08 X10*6/UL (ref 4.5–5.9)
SODIUM SERPL-SCNC: 135 MMOL/L (ref 136–145)
TIBC SERPL-MCNC: 320 UG/DL (ref 240–445)
UIBC SERPL-MCNC: 251 UG/DL (ref 110–370)
WBC # BLD AUTO: 7.9 X10*3/UL (ref 4.4–11.3)

## 2025-04-21 PROCEDURE — 3078F DIAST BP <80 MM HG: CPT | Performed by: NURSE PRACTITIONER

## 2025-04-21 PROCEDURE — 99213 OFFICE O/P EST LOW 20 MIN: CPT | Performed by: NURSE PRACTITIONER

## 2025-04-21 PROCEDURE — 82728 ASSAY OF FERRITIN: CPT | Performed by: NURSE PRACTITIONER

## 2025-04-21 PROCEDURE — 80048 BASIC METABOLIC PNL TOTAL CA: CPT | Performed by: NURSE PRACTITIONER

## 2025-04-21 PROCEDURE — 4010F ACE/ARB THERAPY RXD/TAKEN: CPT | Performed by: NURSE PRACTITIONER

## 2025-04-21 PROCEDURE — 36415 COLL VENOUS BLD VENIPUNCTURE: CPT | Performed by: NURSE PRACTITIONER

## 2025-04-21 PROCEDURE — 3008F BODY MASS INDEX DOCD: CPT | Performed by: NURSE PRACTITIONER

## 2025-04-21 PROCEDURE — 83540 ASSAY OF IRON: CPT | Performed by: NURSE PRACTITIONER

## 2025-04-21 PROCEDURE — 3074F SYST BP LT 130 MM HG: CPT | Performed by: NURSE PRACTITIONER

## 2025-04-21 PROCEDURE — 85025 COMPLETE CBC W/AUTO DIFF WBC: CPT | Performed by: NURSE PRACTITIONER

## 2025-04-21 ASSESSMENT — ENCOUNTER SYMPTOMS
HEMATOLOGIC/LYMPHATIC NEGATIVE: 1
CONSTITUTIONAL NEGATIVE: 1
MUSCULOSKELETAL NEGATIVE: 1
NEUROLOGICAL NEGATIVE: 1
ENDOCRINE NEGATIVE: 1
RESPIRATORY NEGATIVE: 1
EYES NEGATIVE: 1
PSYCHIATRIC NEGATIVE: 1
CARDIOVASCULAR NEGATIVE: 1
GASTROINTESTINAL NEGATIVE: 1

## 2025-04-21 ASSESSMENT — PAIN SCALES - GENERAL: PAINLEVEL_OUTOF10: 0-NO PAIN

## 2025-04-21 NOTE — PROGRESS NOTES
Patient ID: Morro Seo is a 59 y.o. male.  Referring Physician: Rosanne M Casal, APRN-CNP, DNP  [address] [place] [address]  Primary Care Provider: Lisseth Thomas DO  Visit Type: Follow Up      Subjective    Interval History:    Morro Seo is here today for interval follow-up and treatment of secondary polycythemia. He reports he is overall doing okay.  His hematocrit was over 56% when we first met him.  We did therapeutic phlebotomy and today he is 45%. He has chronic shortness of breath secondary to COPD and that remains unchanged. He recently had a cardiac workup and is scheduled to have a cardiac cath tomorrow. He has a pacemaker. States that he has cut down considerably on cigarettes and knows he needs to quit for good. Denies any chest pain, abdominal pain, bowel habit changes. Denies any night sweats, fevers, chills. He denies any indications  of bleeding. Remains on low dosage aspirin daily.     Past Medical History:  Secondary polycythemia: Initial consultation for erythrocytosis with Dr. Galicia as CBC done in October 2021 showed WBC 11.3, RBC 6.05, Hgb 18.2, Hct 56.9, Plt 187. He has a history of COPD, sleep apnea  and nicotine dependence. Henrry-2 negative, serum erythropoietin level 10.1. He has been receiving treatment with therapeutic phlebotomies.     Hypertension, left ventricular hypertrophy, status post pacemaker placement due to AV block, COPD, sleep apnea and obesity. No prior history of MI, CVA or VTE.     Social History:  He is a retired . Significant smoking history over 40 pack years. Stopped completely 6 months ago.      Review of Systems   Constitutional: Negative.    HENT:  Negative.     Eyes: Negative.    Respiratory: Negative.     Cardiovascular: Negative.    Gastrointestinal: Negative.    Endocrine: Negative.    Genitourinary: Negative.     Musculoskeletal: Negative.    Skin: Negative.    Neurological: Negative.    Hematological: Negative.   "  Psychiatric/Behavioral: Negative.          Objective   BSA: 2.59 meters squared  /72 (BP Location: Left arm, Patient Position: Sitting)   Pulse 69   Temp 36.4 °C (97.5 °F)   Resp 16   Ht 1.753 m (5' 9.02\")   Wt 138 kg (305 lb 3.6 oz)   SpO2 97%   BMI 45.05 kg/m²      has a past medical history of Abnormal dobutamine stress echo (09/12/2024), Complete heart block (10/31/2023), Diverticulosis of intestine, part unspecified, without perforation or abscess without bleeding (01/30/2020), Moderate tobacco use disorder (05/09/2023), Personal history of other endocrine, nutritional and metabolic disease (01/30/2020), Personal history of other endocrine, nutritional and metabolic disease (01/30/2020), Personal history of other specified conditions (05/04/2018), Personal history of pneumonia (recurrent) (05/08/2018), Solitary pulmonary nodule (05/04/2018), and Unspecified open wound of unspecified toe(s) without damage to nail, sequela (08/11/2020).   has a past surgical history that includes Other surgical history (04/28/2020); Other surgical history (04/28/2020); Other surgical history (04/28/2020); Other surgical history (04/28/2020); Other surgical history (07/23/2020); and Cardiac catheterization (N/A, 10/24/2024).  Family History[1]      Morro Seo  reports that he quit smoking about 6 months ago. His smoking use included cigarettes. He has never used smokeless tobacco.  He  reports that he does not currently use alcohol.  He  reports no history of drug use.    Physical Exam  Constitutional:       Appearance: He is obese.   HENT:      Head: Normocephalic.      Nose: Nose normal.      Mouth/Throat:      Mouth: Mucous membranes are moist.   Eyes:      Pupils: Pupils are equal, round, and reactive to light.   Cardiovascular:      Rate and Rhythm: Normal rate and regular rhythm.      Pulses: Normal pulses.      Heart sounds: Normal heart sounds.   Pulmonary:      Effort: Pulmonary effort is normal.      " Breath sounds: Normal breath sounds.   Abdominal:      General: Bowel sounds are normal.      Palpations: Abdomen is soft.   Musculoskeletal:         General: Normal range of motion.   Skin:     General: Skin is warm and dry.   Neurological:      General: No focal deficit present.      Mental Status: He is alert and oriented to person, place, and time.   Psychiatric:         Mood and Affect: Mood normal.         Behavior: Behavior normal.         WBC   Date/Time Value Ref Range Status   04/21/2025 10:04 AM 7.9 4.4 - 11.3 x10*3/uL Final   10/01/2024 11:14 AM 7.6 4.4 - 11.3 x10*3/uL Final   03/25/2024 02:29 PM 9.2 4.4 - 11.3 x10*3/uL Final     nRBC   Date Value Ref Range Status   10/01/2024 0.0 0.0 - 0.0 /100 WBCs Final   02/19/2024 0.0 0.0 - 0.0 /100 WBCs Final   11/15/2023 0.0 0.0 - 0.0 /100 WBCs Final     RBC   Date Value Ref Range Status   04/21/2025 5.08 4.50 - 5.90 x10*6/uL Final   10/01/2024 5.49 4.50 - 5.90 x10*6/uL Final   03/25/2024 5.62 4.50 - 5.90 x10*6/uL Final     Hemoglobin   Date Value Ref Range Status   04/21/2025 15.0 13.5 - 17.5 g/dL Final   10/01/2024 16.8 13.5 - 17.5 g/dL Final   03/25/2024 17.0 13.5 - 17.5 g/dL Final     Hematocrit   Date Value Ref Range Status   04/21/2025 45.3 41.0 - 52.0 % Final   10/01/2024 51.2 41.0 - 52.0 % Final   03/25/2024 50.2 41.0 - 52.0 % Final     MCV   Date/Time Value Ref Range Status   04/21/2025 10:04 AM 89 80 - 100 fL Final   10/01/2024 11:14 AM 93 80 - 100 fL Final   03/25/2024 02:29 PM 89 80 - 100 fL Final     MCH   Date/Time Value Ref Range Status   04/21/2025 10:04 AM 29.5 26.0 - 34.0 pg Final   10/01/2024 11:14 AM 30.6 26.0 - 34.0 pg Final   03/25/2024 02:29 PM 30.2 26.0 - 34.0 pg Final     MCHC   Date/Time Value Ref Range Status   04/21/2025 10:04 AM 33.1 32.0 - 36.0 g/dL Final   10/01/2024 11:14 AM 32.8 32.0 - 36.0 g/dL Final   03/25/2024 02:29 PM 33.9 32.0 - 36.0 g/dL Final     RDW   Date/Time Value Ref Range Status   04/21/2025 10:04 AM 14.2 11.5 - 14.5  "% Final   10/01/2024 11:14 AM 13.6 11.5 - 14.5 % Final   03/25/2024 02:29 PM 14.2 11.5 - 14.5 % Final     Platelets   Date/Time Value Ref Range Status   04/21/2025 10:04  150 - 450 x10*3/uL Final   10/01/2024 11:14  150 - 450 x10*3/uL Final   03/25/2024 02:29  150 - 450 x10*3/uL Final     No results found for: \"MPV\"  Neutrophils %   Date/Time Value Ref Range Status   04/21/2025 10:04 AM 72.8 40.0 - 80.0 % Final   03/25/2024 02:29 PM 70.2 40.0 - 80.0 % Final   02/19/2024 05:46 PM 78.4 40.0 - 80.0 % Final     Immature Granulocytes %, Automated   Date/Time Value Ref Range Status   04/21/2025 10:04 AM 0.6 0.0 - 0.9 % Final     Comment:     Immature Granulocyte Count (IG) includes promyelocytes, myelocytes and metamyelocytes but does not include bands. Percent differential counts (%) should be interpreted in the context of the absolute cell counts (cells/UL).   03/25/2024 02:29 PM 0.4 0.0 - 0.9 % Final     Comment:     Immature Granulocyte Count (IG) includes promyelocytes, myelocytes and metamyelocytes but does not include bands. Percent differential counts (%) should be interpreted in the context of the absolute cell counts (cells/UL).   02/19/2024 05:46 PM 0.5 0.0 - 0.9 % Final     Comment:     Immature Granulocyte Count (IG) includes promyelocytes, myelocytes and metamyelocytes but does not include bands. Percent differential counts (%) should be interpreted in the context of the absolute cell counts (cells/UL).     Lymphocytes %   Date/Time Value Ref Range Status   04/21/2025 10:04 AM 15.6 13.0 - 44.0 % Final   03/25/2024 02:29 PM 18.8 13.0 - 44.0 % Final   02/19/2024 05:46 PM 12.3 13.0 - 44.0 % Final     Monocytes %   Date/Time Value Ref Range Status   04/21/2025 10:04 AM 8.8 2.0 - 10.0 % Final   03/25/2024 02:29 PM 7.3 2.0 - 10.0 % Final   02/19/2024 05:46 PM 5.7 2.0 - 10.0 % Final     Eosinophils %   Date/Time Value Ref Range Status   04/21/2025 10:04 AM 1.9 0.0 - 6.0 % Final   03/25/2024 02:29 " PM 3.0 0.0 - 6.0 % Final   02/19/2024 05:46 PM 2.8 0.0 - 6.0 % Final     Basophils %   Date/Time Value Ref Range Status   04/21/2025 10:04 AM 0.3 0.0 - 2.0 % Final   03/25/2024 02:29 PM 0.3 0.0 - 2.0 % Final   02/19/2024 05:46 PM 0.3 0.0 - 2.0 % Final     Neutrophils Absolute   Date/Time Value Ref Range Status   04/21/2025 10:04 AM 5.78 1.20 - 7.70 x10*3/uL Final     Comment:     Percent differential counts (%) should be interpreted in the context of the absolute cell counts (cells/uL).   03/25/2024 02:29 PM 6.46 1.20 - 7.70 x10*3/uL Final     Comment:     Percent differential counts (%) should be interpreted in the context of the absolute cell counts (cells/uL).   02/19/2024 05:46 PM 7.54 1.20 - 7.70 x10*3/uL Final     Comment:     Percent differential counts (%) should be interpreted in the context of the absolute cell counts (cells/uL).     Immature Granulocytes Absolute, Automated   Date/Time Value Ref Range Status   04/21/2025 10:04 AM 0.05 0.00 - 0.70 x10*3/uL Final   03/25/2024 02:29 PM 0.04 0.00 - 0.70 x10*3/uL Final   02/19/2024 05:46 PM 0.05 0.00 - 0.70 x10*3/uL Final     Lymphocytes Absolute   Date/Time Value Ref Range Status   04/21/2025 10:04 AM 1.24 1.20 - 4.80 x10*3/uL Final   03/25/2024 02:29 PM 1.73 1.20 - 4.80 x10*3/uL Final   02/19/2024 05:46 PM 1.18 (L) 1.20 - 4.80 x10*3/uL Final     Monocytes Absolute   Date/Time Value Ref Range Status   04/21/2025 10:04 AM 0.70 0.10 - 1.00 x10*3/uL Final   03/25/2024 02:29 PM 0.67 0.10 - 1.00 x10*3/uL Final   02/19/2024 05:46 PM 0.55 0.10 - 1.00 x10*3/uL Final     Eosinophils Absolute   Date/Time Value Ref Range Status   04/21/2025 10:04 AM 0.15 0.00 - 0.70 x10*3/uL Final   03/25/2024 02:29 PM 0.28 0.00 - 0.70 x10*3/uL Final   02/19/2024 05:46 PM 0.27 0.00 - 0.70 x10*3/uL Final     Basophils Absolute   Date/Time Value Ref Range Status   04/21/2025 10:04 AM 0.02 0.00 - 0.10 x10*3/uL Final   03/25/2024 02:29 PM 0.03 0.00 - 0.10 x10*3/uL Final   02/19/2024 05:46  PM 0.03 0.00 - 0.10 x10*3/uL Final       Assessment/Plan    1.  Erythrocytosis (jak2 negative)- secondary polycythemia due to COPD, smoking, sleep apnea, obesity.Was getting phlebotomy ever 6 months for hematocrit  greater than 50% tolerating well. H/H  today: 45.1%, Patient stopped smoking approx 6 months ago.      2   sleep apnea, Uses CPAP, COPD - feeling better since he quit smoking.      3.  Morbid Obesity      4.  Hypertension      5.  Left ventricular hypertrophy     6.  Diabetes mellitus     Plan:  We will stop phlebotomies for now. The patient spends 6 months a year in [place], starting in October. We will see him back in 1 year (Requests May 2026). I will have him scheduled with Lily Pedersen NP since I will not be seeing patients at [facility] next year. I will follow him in the interim if questions or problems arise.      He will continue  on low-dose aspirin daily. He will follow-up with his primary care provider and all other specialties as indicated. He will call office with any questions/concerns.    I had an extensive discussion with the patient regarding the diagnosis and discussed the plan of therapy, including general considerations regarding side effects and outcomes. Pt understood and gave appropriate teach back about the plan of care. All questions were answered to the patient's satisfaction. The patient is instructed to contact us at any time if questions or problems arise. Thank you for the opportunity to participate in the care of this very pleasant patient.    Problem List Items Addressed This Visit           ICD-10-CM    Erythrocytosis D75.1       Rosanne M Casal, APRN-CNP, DNP                              [1]   Family History  Problem Relation Name Age of Onset    Heart attack Mother      Hypertension Mother      Coronary artery disease Father      Heart attack Father      Hypertension Father      Diabetes Sister      Diabetes Brother      Lung cancer Maternal Grandfather      Lung cancer  Paternal Grandfather

## 2025-04-21 NOTE — TELEPHONE ENCOUNTER
PT called and stated that he has medication questions and would like to speak to a nurse in regards to this

## 2025-04-22 ENCOUNTER — HOSPITAL ENCOUNTER (OUTPATIENT)
Dept: CARDIOLOGY | Facility: HOSPITAL | Age: 60
Discharge: HOME | End: 2025-04-22
Payer: MEDICARE

## 2025-04-22 DIAGNOSIS — Z95.0 PRESENCE OF CARDIAC PACEMAKER: ICD-10-CM

## 2025-04-22 DIAGNOSIS — I44.2 COMPLETE HEART BLOCK: ICD-10-CM

## 2025-04-23 ENCOUNTER — APPOINTMENT (OUTPATIENT)
Dept: PHARMACY | Facility: HOSPITAL | Age: 60
End: 2025-04-23
Payer: MEDICARE

## 2025-04-23 DIAGNOSIS — E11.69 TYPE 2 DIABETES MELLITUS WITH OTHER SPECIFIED COMPLICATION, WITHOUT LONG-TERM CURRENT USE OF INSULIN: ICD-10-CM

## 2025-04-23 RX ORDER — BLOOD-GLUCOSE SENSOR
EACH MISCELLANEOUS
Qty: 2 EACH | Refills: 3 | Status: SHIPPED | OUTPATIENT
Start: 2025-04-23

## 2025-04-23 RX ORDER — DULAGLUTIDE 4.5 MG/.5ML
4.5 INJECTION, SOLUTION SUBCUTANEOUS WEEKLY
Qty: 2 ML | Refills: 2 | Status: SHIPPED | OUTPATIENT
Start: 2025-04-23

## 2025-04-23 NOTE — ASSESSMENT & PLAN NOTE
Patient's diabetes is currently uncontrolled, as shown by A1c of 10.5% (goal <7%)    BG levels have been elevated with FBG ranging from 180-200. Patient endorses no recent episodes of hypoglycemia. Patient is tolerating medications well with no complaints. Discussed with patient and will increase Trulicity to 4.5 mg. Discussed starting a CGM with the patient. Was previously on one in the past and insurance stopped covering it. However, recently had a change of insurance and the patient was content with paying $25/month for FreeStyle Aliza. Patient endorses that he is adherent to his medications although there are discrepancies in the dispense history. Patient has discussed possible follow up with an endocrinologist with PCP if A1c at time of next visit is not improving.     Plan:  INCREASE Trulicity 4.5 mg weekly  Continue all other medications  START FreeStyle Aliza CGM

## 2025-04-23 NOTE — PROGRESS NOTES
Clinical Pharmacy Appointment    Patient ID: Morro Seo is a 59 y.o. male who presents for Diabetes    Pt is here for Follow Up.     Referring Provider: Lisseth Thomas DO  PCP:  Lisseth Thomas DO  Last visit with PCP: 3/12/25  Next visit with PCP: 25    Cardiology: Dr. Núñez, 25  Pulmonology: Dr. Brandon, 25    Subjective     Interval History  Patient reports doing well on current regimen with no reported side effects. He reports blood sugars have been around 140-180 range. Discussed increasing Trulicity to target dose but he reports he would not be able to start it since he is in [place] at the moment. Will increase Trulicity dose next month (3/26/2025)  Blood sugars have been up and down with Trulicity titration. He starts having symptoms of hypoglycemia around 140. We discuss the mechanism behind this. He does note that with each dose increase, he sees fatigue initially x 24 hours after the injection. Has also seen mild diarrhea. With this, we discuss continuing current dose for an additional month. (2025)    HPI  Type II Diabetes  Current  Pharmacotherapy:   Acarbose 50 mg three times daily with meals  Metformin IR 1000 mg twice daily  Glipizide 5 mg twice daily   Trulicity 3 mg once weekly  Farxiga 10 mg daily     SECONDARY PREVENTION  - Statin? Atorvastatin 80 mg   LDL: 37   TC: 101  - ACE-I/ARB? No   UACR: 17   BP: 126/82  - Aspirin? Yes    -The ASCVD Risk score (Belem GARCIA, et al., 2019) failed to calculate for the following reasons:    Risk score cannot be calculated because patient has a medical history suggesting prior/existing ASCVD      Current monitoring regimen:   Patient is using: finger sticks    Checks sugars every morning before food and coffee  FB, 192, 180, 220  Lowest: 90-96    Previous BG Readings: SMBG - 81, 141, 108 (Lowest: 96)    Hypoglycemia: No    Pertinent PMH Review:  - PMH of Pancreatitis: n/a  - PMH/FH of Medullary Thyroid Cancer: n/a  - PMH  of Retinopathy: n/a      Lifestyle Update:  He states with the weather getting warmer getting outside a little bit more. No changes in diet.    Drug Interactions  No relevant drug interactions were noted.    Objective   Allergies   Allergen Reactions    Jardiance [Empagliflozin] Rash    Penicillins Unknown     Unknown reaction as child    Sulfamethoxazole-Trimethoprim Rash     Social History     Social History Narrative    Not on file      Medication Review  Current Outpatient Medications   Medication Instructions    acarbose (PRECOSE) 50 mg, oral, 3 times daily (morning, midday, late afternoon)    albuterol 90 mcg/actuation inhaler 2 puffs, inhalation, Every 4 hours PRN    ammonium lactate (Lac-Hydrin) 12 % lotion USE 1 APPLICATION TO THE AFFECTED AREA EVERY NIGHT    atorvastatin (LIPITOR) 80 mg, oral, Daily    blood sugar diagnostic (Blood Glucose Test) strip Test once daily in the morning    blood-glucose meter misc Use to test blood sugars once daily    budesonide-formoterol (Symbicort) 160-4.5 mcg/actuation inhaler 2 puffs, inhalation, 2 times daily RT, Rinse mouth with water after use to reduce aftertaste and incidence of candidiasis. Do not swallow.    buPROPion SR (WELLBUTRIN SR) 150 mg, oral, 2 times daily, Do not crush, chew, or split.    dapagliflozin propanediol (FARXIGA) 10 mg, oral, Daily    FreeStyle Aliza 3 Sensor device Use to check blood sugars daily. Change sensors every 14 days    gabapentin (NEURONTIN) 100 mg, oral, 2 times daily    glipiZIDE (GLUCOTROL) 5 mg, oral, 2 times daily before meals    ipratropium-albuteroL (Duo-Neb) 0.5-2.5 mg/3 mL nebulizer solution 3 mL, nebulization, Every 4 hours PRN    lancets misc Test once daily in the morning    losartan (COZAAR) 25 mg, oral, Daily    metFORMIN (GLUCOPHAGE) 1,000 mg, oral, 2 times daily (morning and late afternoon)    omeprazole (PRILOSEC) 40 mg, oral, Daily before breakfast, Do not crush or chew.    OneTouch Delica Plus Lancet 30 gauge misc  TEST ONCE DAILY IN THE MORNING    potassium chloride CR 10 mEq ER tablet 10 mEq, oral, Daily    spironolactone (ALDACTONE) 25 mg, oral, Daily    tiotropium (Spiriva Respimat) 2.5 mcg/actuation inhaler 2 puffs, inhalation, Daily    torsemide (DEMADEX) 20 mg, oral, Daily, as directed    Trulicity 4.5 mg, subcutaneous, Weekly      Vitals  BP Readings from Last 2 Encounters:   04/21/25 121/72   03/12/25 126/82     BMI Readings from Last 1 Encounters:   04/21/25 45.05 kg/m²      Labs  A1C  Lab Results   Component Value Date    HGBA1C 10.5 (H) 03/05/2025    HGBA1C 10.2 (H) 12/04/2024    HGBA1C 9.0 08/21/2024     BMP  Lab Results   Component Value Date    CALCIUM 9.4 04/21/2025     (L) 04/21/2025    K 4.2 04/21/2025    CO2 30 04/21/2025     04/21/2025    BUN 23 04/21/2025    CREATININE 0.95 04/21/2025    EGFR >90 04/21/2025     LFTs  Lab Results   Component Value Date    ALT 22 12/04/2024    AST 18 12/04/2024    ALKPHOS 90 12/04/2024    BILITOT 1.4 (H) 12/04/2024     FLP  Lab Results   Component Value Date    TRIG 150 (H) 12/04/2024    CHOL 101 12/04/2024    LDLF 30 03/28/2022    LDLCALC 37 12/04/2024    HDL 34.5 12/04/2024     Urine Microalbumin  Lab Results   Component Value Date    MICROALBCREA 17 03/05/2025     Weight Management  Wt Readings from Last 3 Encounters:   04/21/25 138 kg (305 lb 3.6 oz)   03/12/25 137 kg (302 lb)   02/18/25 136 kg (300 lb)      There is no height or weight on file to calculate BMI.       Assessment/Plan   Problem List Items Addressed This Visit       Type 2 diabetes mellitus, without long-term current use of insulin (Multi)    Current Assessment & Plan   Patient's diabetes is currently uncontrolled, as shown by A1c of 10.5% (goal <7%)    BG levels have been elevated with FBG ranging from 180-200. Patient endorses no recent episodes of hypoglycemia. Patient is tolerating medications well with no complaints. Discussed with patient and will increase Trulicity to 4.5 mg. Discussed  starting a CGM with the patient. Was previously on one in the past and insurance stopped covering it. However, recently had a change of insurance and the patient was content with paying $25/month for FreeStyle Aliza. Patient endorses that he is adherent to his medications although there are discrepancies in the dispense history. Patient has discussed possible follow up with an endocrinologist with PCP if A1c at time of next visit is not improving.     Plan:  INCREASE Trulicity 4.5 mg weekly  Continue all other medications  START FreeStyle Aliza CGM         Relevant Medications    dulaglutide (Trulicity) 4.5 mg/0.5 mL pen injector    FreeStyle Aliza 3 Sensor device    Other Relevant Orders    Referral to Clinical Pharmacy     Clinical Pharmacist follow-up: 5/23 @ 9:00 AM, Telehealth visit    Continue all meds under the continuation of care with the referring provider and clinical pharmacy team.    Terri FortuneD Candidate  AP[employer] Student    Verbal consent to manage patient's drug therapy was obtained from the patient. They were informed they may decline to participate or withdraw from participation in pharmacy services at any time.

## 2025-04-23 NOTE — ASSESSMENT & PLAN NOTE
Patient's diabetes is currently uncontrolled, as shown by A1c of 10.5% (goal <7%)    BG levels have been elevated with FBG ranging from 180-200. Patient endorses no recent episodes of hypoglycemia. Patient is tolerating medications well with no complaints. Discussed with patient and will increase Trulicity to 4.5 mg. Discussed starting a CGM with the patient. Was previously on one in the past and insurance stopped covering it. However, recently had a change of insurance and the patient was content with paying $25/month for FreeStyle Aliza. Patient endorses that he is adherent to his medications. Patient has discussed possible follow up with an endocrinologist with PCP if A1c at time of next visit is not improving.     Plan:  INCREASE Trulicity 4.5 mg weekly  Continue all other medications  START FreeStyle Aliza CGM

## 2025-04-25 ENCOUNTER — TELEPHONE (OUTPATIENT)
Dept: CARDIOLOGY | Facility: HOSPITAL | Age: 60
End: 2025-04-25
Payer: MEDICARE

## 2025-04-25 NOTE — TELEPHONE ENCOUNTER
----- Message from Orquidea Núñez sent at 4/24/2025  2:00 PM EDT -----  Yes I am and not concerned about wall motion abnormality as long as he is not symptomatic.,  With new symptoms.  ----- Message -----  From: Natty Jacobs RN  Sent: 4/24/2025   1:49 PM EDT  To: Orquidea Núñez MD    Did you mean you are not concerned about the WMA as long as he isn't having new symptoms?  ----- Message -----  From: Orquidea Núñez MD  Sent: 4/17/2025   3:01 PM EDT  To: Natty Jacobs RN    Was ordered with strain imaging I do not see any strain report.  There is no LVH.    Concerned about the wall motion abnormality unless patient has developed new symptoms.  Normal cardiac health recently.  Has some degree of cardiomyopathy.  ----- Message -----  From: Ricarda Syngo - Cardiology Results In  Sent: 4/17/2025   2:22 PM EDT  To: Orquidea Núñez MD    
Dr. Núñez reviewed your echocardiogram results.  No changes to treatment plan at this time unless you are having or develop symptoms.  Follow up as scheduled with Bre MENDOZA on 8/18/25 at 11:30.      Reviewed concerning symptoms - chest pain that you cannot relieve with 10 minutes of rest is considered to be an urgent issue.  Also chest pain with associated symptoms - shortness of breath, sweating, nausea, vomiting, radiation to neck/jaw/back/arm, dizziness, near or geovani syncope.  The more symptoms you are having the more serious the problem is.  If you can rest and symptoms resolve, that is a stable condition and can wait to be treated/evaluated.  If rest does not resolve symptoms in 10 to 15 minutes, that is unstable and requires urgent evaluation in ER.    He added his wife to the call and they both verbalized understanding.  They will reach out if he develops mild symptoms.      
English

## 2025-05-05 ENCOUNTER — HOSPITAL ENCOUNTER (OUTPATIENT)
Dept: RADIOLOGY | Facility: HOSPITAL | Age: 60
Discharge: HOME | End: 2025-05-05
Payer: MEDICARE

## 2025-05-05 DIAGNOSIS — F17.210 CIGARETTE SMOKER: ICD-10-CM

## 2025-05-05 PROCEDURE — 71271 CT THORAX LUNG CANCER SCR C-: CPT

## 2025-05-05 PROCEDURE — 71271 CT THORAX LUNG CANCER SCR C-: CPT | Performed by: RADIOLOGY

## 2025-05-08 ENCOUNTER — TELEPHONE (OUTPATIENT)
Dept: PULMONOLOGY | Facility: HOSPITAL | Age: 60
End: 2025-05-08
Payer: MEDICARE

## 2025-05-08 NOTE — TELEPHONE ENCOUNTER
Patient acknowledged understanding. All questions answered at this time.     ----- Message from Terri Lucas sent at 5/8/2025  9:17 AM EDT -----  Please call the patient and let him know that his CT chest is stable, we will continue yearly screening, keep follow up as scheduled with Dr. Brandon.   ----- Message -----  From: Interface, Radiology Results In  Sent: 5/7/2025   9:47 PM EDT  To: CRUZITO Lugo-CNP

## 2025-05-18 DIAGNOSIS — I51.7 LEFT VENTRICULAR HYPERTROPHY: ICD-10-CM

## 2025-05-18 DIAGNOSIS — G47.33 OBSTRUCTIVE SLEEP APNEA: ICD-10-CM

## 2025-05-18 DIAGNOSIS — I50.32 CHRONIC DIASTOLIC (CONGESTIVE) HEART FAILURE: ICD-10-CM

## 2025-05-18 DIAGNOSIS — I10 ESSENTIAL HYPERTENSION: ICD-10-CM

## 2025-05-18 RX ORDER — POTASSIUM CHLORIDE 750 MG/1
10 TABLET, FILM COATED, EXTENDED RELEASE ORAL DAILY
Qty: 90 TABLET | Refills: 1 | Status: SHIPPED | OUTPATIENT
Start: 2025-05-18

## 2025-05-20 ENCOUNTER — OFFICE VISIT (OUTPATIENT)
Dept: PULMONOLOGY | Facility: HOSPITAL | Age: 60
End: 2025-05-20
Payer: MEDICARE

## 2025-05-20 VITALS
DIASTOLIC BLOOD PRESSURE: 76 MMHG | HEART RATE: 71 BPM | SYSTOLIC BLOOD PRESSURE: 115 MMHG | HEIGHT: 69 IN | WEIGHT: 300 LBS | BODY MASS INDEX: 44.43 KG/M2 | RESPIRATION RATE: 16 BRPM | OXYGEN SATURATION: 96 %

## 2025-05-20 DIAGNOSIS — J44.9 CHRONIC OBSTRUCTIVE PULMONARY DISEASE, UNSPECIFIED COPD TYPE (MULTI): Primary | ICD-10-CM

## 2025-05-20 DIAGNOSIS — I50.32 CHRONIC DIASTOLIC (CONGESTIVE) HEART FAILURE: ICD-10-CM

## 2025-05-20 DIAGNOSIS — Z87.891 FORMER CIGARETTE SMOKER: ICD-10-CM

## 2025-05-20 DIAGNOSIS — G47.33 OSA (OBSTRUCTIVE SLEEP APNEA): ICD-10-CM

## 2025-05-20 DIAGNOSIS — E11.69 TYPE 2 DIABETES MELLITUS WITH OTHER SPECIFIED COMPLICATION, WITHOUT LONG-TERM CURRENT USE OF INSULIN: ICD-10-CM

## 2025-05-20 PROCEDURE — 3074F SYST BP LT 130 MM HG: CPT | Performed by: INTERNAL MEDICINE

## 2025-05-20 PROCEDURE — 1036F TOBACCO NON-USER: CPT | Performed by: INTERNAL MEDICINE

## 2025-05-20 PROCEDURE — 99214 OFFICE O/P EST MOD 30 MIN: CPT | Performed by: INTERNAL MEDICINE

## 2025-05-20 PROCEDURE — 4010F ACE/ARB THERAPY RXD/TAKEN: CPT | Performed by: INTERNAL MEDICINE

## 2025-05-20 PROCEDURE — 3078F DIAST BP <80 MM HG: CPT | Performed by: INTERNAL MEDICINE

## 2025-05-20 PROCEDURE — 3008F BODY MASS INDEX DOCD: CPT | Performed by: INTERNAL MEDICINE

## 2025-05-20 ASSESSMENT — ENCOUNTER SYMPTOMS
FEVER: 0
CHILLS: 0
COUGH: 0
WHEEZING: 0
SHORTNESS OF BREATH: 1
FATIGUE: 0
UNEXPECTED WEIGHT CHANGE: 0
RHINORRHEA: 0

## 2025-05-20 NOTE — PROGRESS NOTES
Subjective   Patient ID: Morro Seo is a 59 y.o. male who presents for follow up COPD.     HPI: Patient has  PMH of COPD was diagnosed around 2015 by Dr. Jefferson PCP in [place]. He had PFTs in June 2018 ordered by Dr. Bayron Allred showed FEV1 48-56%, %. He started smoking consistently at age of 19 and has smoked 2-3 ppd x 38 years and now since 2021 he has been smoking 1 to 1.5 ppd after he retired as a . He now notices coughing spells and shortness of breath on any activity. He has NAVYA and uses CPAP nightly. He denies any acute worsening but notes progressively worsening shortness of breath.      Today he is here for follow up. He states that breathing has been doing the same without worsening. He states he has been using CPAP nightly but lately he feels like suffocation that he describes as a panic attack needing to remove the mask. He uses a Full Face Mask. He does not use humidification on the machine. He denies getting dry mouth. He was smoking 1 ppd but quit in Oct 2024. He still gets cravings for smoking but is motivated to abstain from smoking and is already on Bupropion. He notes improvement in his cough and SOB since he stopped smoking. He could not walk more than 100 feet without pausing but now he is able to walk longer distances without stopping to catch a breath. His leg swelling is better controlled on Torsemide 20 mg daily and follows with Dr. Davis.      Review of Systems   Constitutional:  Negative for chills, fatigue, fever and unexpected weight change.   HENT:  Negative for congestion, postnasal drip and rhinorrhea.    Respiratory:  Positive for shortness of breath. Negative for cough (denies hemoptysis.) and wheezing.    Cardiovascular:  Negative for chest pain and leg swelling.   All other systems reviewed and are negative.      Objective   Physical Exam  Vitals reviewed.   Constitutional:       Appearance: Normal appearance.   HENT:      Head: Normocephalic.   Cardiovascular:       Rate and Rhythm: Normal rate and regular rhythm.   Pulmonary:      Effort: Pulmonary effort is normal.      Breath sounds: Decreased breath sounds present.   Skin:     General: Skin is warm and dry.   Neurological:      Mental Status: He is alert.       Assessment/Plan   1. COPD  2. Chronic cor pulmonale  3. Former heavy cigarette smoker quit Oct 2024  4. NAVYA  5. Morbid Obesity  6. Right basal granuloma of lung 2020  7. Bilateral leg edema  8. Polycythemia  9. Hx of heavy alcohol use     Plan:  Patient appears to have clinically severe COPD besides morbid obesity contributing to his TELLO. His prior PFTs in 2018 showed FEV1 48-56%, %. Repeat PFTs done with FEV1 46-51.    -6MWT done and he did not qualify for oxygen.   -He was smoking at 1 ppd and has smoked the past 40 years at 1.5 ppd but some years 2-3 ppd. He has now not smoked since October 2024. He has tolerable cravings and does not want additional NRT. He is already taking Bupropion as mood stabilizer.  -CXR done with pulmonary edema. He is seeing Dr. Núñez for chronic diastolic CHF and is on Torsemide.   -LDCT chest May 2025 without concerning or new nodules. Stable small bilateral nodules up to 5 mm with benign appearance. Postinfectious scarring and calcifications in the RLL. Repeat on May 5, 2026.     A shared decision making was done regarding the importance of Low Dose CT chest in screening for lung nodules. Discussed patient's risk factors for malignancy and qualifications for screening test. Discussed the follow up steps if nodules are found based on RAD guidelines. Patient understands and would proceed with Lung Cancer Screening CT chest protocol.     -Continue on Symbicort and Spiriva inhalers daily.   -Continue albuterol as needed.  -continue to use CPAP nightly. He is having issues lately with waking up suffocating. Will obtain data and review if needs adjustment. May need a retitration study.    -He had severe coronary artery  calcifications on LDCT, had heart cath Oct 2024 with Dr. Davis with mild to moderate CAD, LVEDP was high at 19.     Patient has severe COPD.  He is adherent to Symbicort and Spiriva inhalers.  He has not smoked since October 2024.  Overall COPD management is currently optimized.  We will monitor for any rate of exacerbations.  He has history of NAVYA and has seen Dr. Majano in 2022 but did not follow-up.  He is however adherent to CPAP 12 CWP nightly.  It appears that it was changed to AutoPap at 1 point.  He reports some intolerance and will require data review and pressure adjustment.  I advised him that he may need retitration study if he continues to have intolerance to PAP and mask.    I advised him to see Terri DAVIS before he goes to [place] in September 2025.  Advised him to bring the machine with him.  He will continue to follow-up with Terri DAVIS with a repeat CT chest in May 2026 after that.

## 2025-05-22 RX ORDER — METFORMIN HYDROCHLORIDE 1000 MG/1
1000 TABLET ORAL
Qty: 180 TABLET | Refills: 0 | Status: SHIPPED | OUTPATIENT
Start: 2025-05-22

## 2025-05-22 NOTE — PROGRESS NOTES
Clinical Pharmacy Appointment    Patient ID: Morro Seo is a 59 y.o. male who presents for Diabetes    Pt is here for Follow Up.     Referring Provider: Lisseth Thomas DO  PCP: DO Lisseth Cárdenas DO  Last visit with PCP: 3/12/25  Next visit with PCP: 25    Cardiology: Dr. Núñez, 3/31/2026  Pulmonology: Dr. Brandon, 2025    Subjective     Interval History  Increase Trulicity to 4.5 mg weekly and started CGM    HPI  Type II Diabetes  Current  Pharmacotherapy:   Acarbose 50 mg three times daily with meals  Metformin IR 1000 mg twice daily  Glipizide 5 mg twice daily   Trulicity 3 mg once weekly  Farxiga 10 mg daily     SECONDARY PREVENTION  - Statin? Atorvastatin 80 mg   LDL: 37   TC: 101  - ACE-I/ARB? No   UACR: 17   BP: 126/82  - Aspirin? Yes    -The ASCVD Risk score (Belem GARCIA, et al., 2019) failed to calculate for the following reasons:    Risk score cannot be calculated because patient has a medical history suggesting prior/existing ASCVD      Current monitoring regimen:   Patient is using: CGM    FBs  Lowest: high 60s    Hypoglycemia: Yes    Pertinent PMH Review:  - PMH of Pancreatitis: n/a  - PMH/FH of Medullary Thyroid Cancer: n/a  - PMH of Retinopathy: n/a      Lifestyle Update:  He states with the weather getting warmer getting outside a little bit more. No changes in diet.    Drug Interactions  No relevant drug interactions were noted.    Objective   Allergies   Allergen Reactions    Jardiance [Empagliflozin] Rash    Penicillins Unknown     Unknown reaction as child    Sulfamethoxazole-Trimethoprim Rash     Social History     Social History Narrative    Not on file      Medication Review  Current Outpatient Medications   Medication Instructions    acarbose (PRECOSE) 50 mg, oral, 3 times daily (morning, midday, late afternoon)    albuterol 90 mcg/actuation inhaler 2 puffs, inhalation, Every 4 hours PRN    ammonium lactate (Lac-Hydrin) 12 % lotion USE 1  APPLICATION TO THE AFFECTED AREA EVERY NIGHT    atorvastatin (LIPITOR) 80 mg, oral, Daily    blood sugar diagnostic (Blood Glucose Test) strip Test once daily in the morning    blood-glucose meter misc Use to test blood sugars once daily    budesonide-formoterol (Symbicort) 160-4.5 mcg/actuation inhaler 2 puffs, inhalation, 2 times daily RT, Rinse mouth with water after use to reduce aftertaste and incidence of candidiasis. Do not swallow.    buPROPion SR (WELLBUTRIN SR) 150 mg, oral, 2 times daily, Do not crush, chew, or split.    dapagliflozin propanediol (FARXIGA) 10 mg, oral, Daily    FreeStyle Aliza 3 Sensor device Use to check blood sugars daily. Change sensors every 14 days    gabapentin (NEURONTIN) 100 mg, oral, 2 times daily    glipiZIDE (GLUCOTROL) 5 mg, oral, 2 times daily before meals    ipratropium-albuteroL (Duo-Neb) 0.5-2.5 mg/3 mL nebulizer solution 3 mL, nebulization, Every 4 hours PRN    lancets misc Test once daily in the morning    losartan (COZAAR) 25 mg, oral, Daily    metFORMIN (GLUCOPHAGE) 1,000 mg, oral, 2 times daily (morning and late afternoon)    omeprazole (PRILOSEC) 40 mg, oral, Daily before breakfast, Do not crush or chew.    OneTouch Delica Plus Lancet 30 gauge misc TEST ONCE DAILY IN THE MORNING    potassium chloride CR 10 mEq ER tablet 10 mEq, oral, Daily    spironolactone (ALDACTONE) 25 mg, oral, Daily    tiotropium (Spiriva Respimat) 2.5 mcg/actuation inhaler 2 puffs, inhalation, Daily    torsemide (DEMADEX) 20 mg, oral, Daily, as directed    Trulicity 4.5 mg, subcutaneous, Weekly      Vitals  BP Readings from Last 2 Encounters:   05/20/25 115/76   04/21/25 121/72     BMI Readings from Last 1 Encounters:   05/20/25 44.30 kg/m²      Labs  A1C  Lab Results   Component Value Date    HGBA1C 10.5 (H) 03/05/2025    HGBA1C 10.2 (H) 12/04/2024    HGBA1C 9.0 08/21/2024     BMP  Lab Results   Component Value Date    CALCIUM 9.4 04/21/2025     (L) 04/21/2025    K 4.2 04/21/2025    CO2  30 04/21/2025     04/21/2025    BUN 23 04/21/2025    CREATININE 0.95 04/21/2025    EGFR >90 04/21/2025     LFTs  Lab Results   Component Value Date    ALT 22 12/04/2024    AST 18 12/04/2024    ALKPHOS 90 12/04/2024    BILITOT 1.4 (H) 12/04/2024     FLP  Lab Results   Component Value Date    TRIG 150 (H) 12/04/2024    CHOL 101 12/04/2024    LDLF 30 03/28/2022    LDLCALC 37 12/04/2024    HDL 34.5 12/04/2024     Urine Microalbumin  Lab Results   Component Value Date    MICROALBCREA 17 03/05/2025     Weight Management  Wt Readings from Last 3 Encounters:   05/20/25 136 kg (300 lb)   04/21/25 138 kg (305 lb 3.6 oz)   03/12/25 137 kg (302 lb)      There is no height or weight on file to calculate BMI.       Assessment/Plan   Problem List Items Addressed This Visit       Type 2 diabetes mellitus, without long-term current use of insulin (Multi)    Current Assessment & Plan   Patient's diabetes is currently uncontrolled, as shown by A1c of 10.5% (goal <7%).     The patient reports doing well on the current regimen. He notes that his blood sugar levels have mostly been within the target range, with occasional episodes of low blood sugar in the mornings. Also, he reports this is the best his sugars have been and would like to continue current regimen. He plans to have lab work done next month. Dose adjustments will be considered based on the updated A1c results.    Plan:  CONTINUE Trulicity 4.5 mg weekly and all other medications as prescribed          Clinical Pharmacist follow-up: 6/20 @ 9:00 AM, Telehealth visit    Continue all meds under the continuation of care with the referring provider and clinical pharmacy team.    Lemuel Alanis, PharmD  PGY-1 Pharmacy Resident     Verbal consent to manage patient's drug therapy was obtained from the patient. They were informed they may decline to participate or withdraw from participation in pharmacy services at any time.

## 2025-05-23 ENCOUNTER — APPOINTMENT (OUTPATIENT)
Dept: PHARMACY | Facility: HOSPITAL | Age: 60
End: 2025-05-23
Payer: MEDICARE

## 2025-05-23 DIAGNOSIS — E11.69 TYPE 2 DIABETES MELLITUS WITH OTHER SPECIFIED COMPLICATION, WITHOUT LONG-TERM CURRENT USE OF INSULIN: ICD-10-CM

## 2025-05-23 RX ORDER — DULAGLUTIDE 4.5 MG/.5ML
4.5 INJECTION, SOLUTION SUBCUTANEOUS WEEKLY
Qty: 2 ML | Refills: 2 | Status: SHIPPED | OUTPATIENT
Start: 2025-05-23

## 2025-05-23 NOTE — ASSESSMENT & PLAN NOTE
Patient's diabetes is currently uncontrolled, as shown by A1c of 10.5% (goal <7%).     The patient reports doing well on the current regimen. He notes that his blood sugar levels have mostly been within the target range, with occasional episodes of low blood sugar in the mornings. Also, he reports this is the best his sugars have been and would like to continue current regimen. He plans to have lab work done next month. Dose adjustments will be considered based on the updated A1c results.    Plan:  CONTINUE Trulicity 4.5 mg weekly and all other medications as prescribed

## 2025-05-27 DIAGNOSIS — E11.65 TYPE 2 DIABETES MELLITUS WITH HYPERGLYCEMIA, WITHOUT LONG-TERM CURRENT USE OF INSULIN: ICD-10-CM

## 2025-05-27 DIAGNOSIS — E78.5 HYPERLIPIDEMIA, UNSPECIFIED HYPERLIPIDEMIA TYPE: ICD-10-CM

## 2025-06-17 ENCOUNTER — HOSPITAL ENCOUNTER (OUTPATIENT)
Dept: CARDIOLOGY | Facility: HOSPITAL | Age: 60
Discharge: HOME | End: 2025-06-17
Payer: MEDICARE

## 2025-06-17 DIAGNOSIS — I44.2 COMPLETE HEART BLOCK: ICD-10-CM

## 2025-06-17 DIAGNOSIS — Z95.0 PRESENCE OF CARDIAC PACEMAKER: ICD-10-CM

## 2025-06-18 LAB
ALBUMIN SERPL-MCNC: 4.9 G/DL (ref 3.6–5.1)
ALP SERPL-CCNC: 77 U/L (ref 35–144)
ALT SERPL-CCNC: 26 U/L (ref 9–46)
ANION GAP SERPL CALCULATED.4IONS-SCNC: 10 MMOL/L (CALC) (ref 7–17)
AST SERPL-CCNC: 20 U/L (ref 10–35)
BILIRUB SERPL-MCNC: 1.2 MG/DL (ref 0.2–1.2)
BUN SERPL-MCNC: 23 MG/DL (ref 7–25)
CALCIUM SERPL-MCNC: 10.4 MG/DL (ref 8.6–10.3)
CHLORIDE SERPL-SCNC: 97 MMOL/L (ref 98–110)
CO2 SERPL-SCNC: 32 MMOL/L (ref 20–32)
CREAT SERPL-MCNC: 0.75 MG/DL (ref 0.7–1.35)
EGFRCR SERPLBLD CKD-EPI 2021: 103 ML/MIN/1.73M2
EST. AVERAGE GLUCOSE BLD GHB EST-MCNC: 163 MG/DL
EST. AVERAGE GLUCOSE BLD GHB EST-SCNC: 9 MMOL/L
GLUCOSE SERPL-MCNC: 95 MG/DL (ref 65–99)
HBA1C MFR BLD: 7.3 %
POTASSIUM SERPL-SCNC: 4.4 MMOL/L (ref 3.5–5.3)
PROT SERPL-MCNC: 7 G/DL (ref 6.1–8.1)
SODIUM SERPL-SCNC: 139 MMOL/L (ref 135–146)

## 2025-06-19 ENCOUNTER — APPOINTMENT (OUTPATIENT)
Dept: PRIMARY CARE | Facility: CLINIC | Age: 60
End: 2025-06-19
Payer: MEDICARE

## 2025-06-19 VITALS
TEMPERATURE: 97.5 F | BODY MASS INDEX: 43.99 KG/M2 | OXYGEN SATURATION: 95 % | WEIGHT: 297 LBS | HEIGHT: 69 IN | HEART RATE: 68 BPM | SYSTOLIC BLOOD PRESSURE: 134 MMHG | DIASTOLIC BLOOD PRESSURE: 82 MMHG

## 2025-06-19 DIAGNOSIS — Z00.00 ROUTINE GENERAL MEDICAL EXAMINATION AT HEALTH CARE FACILITY: Primary | ICD-10-CM

## 2025-06-19 DIAGNOSIS — Z01.84 IMMUNITY STATUS TESTING: ICD-10-CM

## 2025-06-19 DIAGNOSIS — E11.49 OTHER DIABETIC NEUROLOGICAL COMPLICATION ASSOCIATED WITH TYPE 2 DIABETES MELLITUS: ICD-10-CM

## 2025-06-19 DIAGNOSIS — E11.65 TYPE 2 DIABETES MELLITUS WITH HYPERGLYCEMIA, WITHOUT LONG-TERM CURRENT USE OF INSULIN: ICD-10-CM

## 2025-06-19 DIAGNOSIS — E66.01 MORBID OBESITY (MULTI): ICD-10-CM

## 2025-06-19 DIAGNOSIS — Z78.9 FULL CODE STATUS: ICD-10-CM

## 2025-06-19 DIAGNOSIS — E78.5 HYPERLIPIDEMIA, UNSPECIFIED HYPERLIPIDEMIA TYPE: ICD-10-CM

## 2025-06-19 DIAGNOSIS — K76.0 FATTY LIVER: ICD-10-CM

## 2025-06-19 DIAGNOSIS — K92.0 HEMATEMESIS WITH NAUSEA: ICD-10-CM

## 2025-06-19 DIAGNOSIS — E11.69 TYPE 2 DIABETES MELLITUS WITH OTHER SPECIFIED COMPLICATION, WITHOUT LONG-TERM CURRENT USE OF INSULIN: ICD-10-CM

## 2025-06-19 DIAGNOSIS — G47.33 OSA (OBSTRUCTIVE SLEEP APNEA): ICD-10-CM

## 2025-06-19 DIAGNOSIS — F33.0 MILD EPISODE OF RECURRENT MAJOR DEPRESSIVE DISORDER: ICD-10-CM

## 2025-06-19 PROBLEM — F41.1 GENERALIZED ANXIETY DISORDER: Status: ACTIVE | Noted: 2025-06-19

## 2025-06-19 PROCEDURE — 1036F TOBACCO NON-USER: CPT | Performed by: FAMILY MEDICINE

## 2025-06-19 PROCEDURE — 3008F BODY MASS INDEX DOCD: CPT | Performed by: FAMILY MEDICINE

## 2025-06-19 PROCEDURE — 99396 PREV VISIT EST AGE 40-64: CPT | Performed by: FAMILY MEDICINE

## 2025-06-19 PROCEDURE — 4010F ACE/ARB THERAPY RXD/TAKEN: CPT | Performed by: FAMILY MEDICINE

## 2025-06-19 PROCEDURE — G0439 PPPS, SUBSEQ VISIT: HCPCS | Performed by: FAMILY MEDICINE

## 2025-06-19 PROCEDURE — 99214 OFFICE O/P EST MOD 30 MIN: CPT | Performed by: FAMILY MEDICINE

## 2025-06-19 PROCEDURE — G0446 INTENS BEHAVE THER CARDIO DX: HCPCS | Performed by: FAMILY MEDICINE

## 2025-06-19 PROCEDURE — 3079F DIAST BP 80-89 MM HG: CPT | Performed by: FAMILY MEDICINE

## 2025-06-19 PROCEDURE — 3075F SYST BP GE 130 - 139MM HG: CPT | Performed by: FAMILY MEDICINE

## 2025-06-19 RX ORDER — GLIPIZIDE 5 MG/1
5 TABLET ORAL
Qty: 180 TABLET | Refills: 0 | Status: SHIPPED | OUTPATIENT
Start: 2025-06-19 | End: 2025-09-17

## 2025-06-19 RX ORDER — METFORMIN HYDROCHLORIDE 1000 MG/1
1000 TABLET ORAL
Qty: 180 TABLET | Refills: 0 | Status: SHIPPED | OUTPATIENT
Start: 2025-06-19 | End: 2025-06-19

## 2025-06-19 RX ORDER — DULAGLUTIDE 4.5 MG/.5ML
4.5 INJECTION, SOLUTION SUBCUTANEOUS WEEKLY
Qty: 6 ML | Refills: 0 | Status: SHIPPED | OUTPATIENT
Start: 2025-06-19

## 2025-06-19 RX ORDER — OMEPRAZOLE 40 MG/1
40 CAPSULE, DELAYED RELEASE ORAL
Qty: 90 CAPSULE | Refills: 0 | Status: SHIPPED | OUTPATIENT
Start: 2025-06-19 | End: 2025-06-19

## 2025-06-19 RX ORDER — DULAGLUTIDE 4.5 MG/.5ML
4.5 INJECTION, SOLUTION SUBCUTANEOUS WEEKLY
Qty: 2 ML | Refills: 2 | Status: SHIPPED | OUTPATIENT
Start: 2025-06-19 | End: 2025-06-19

## 2025-06-19 RX ORDER — SERTRALINE HYDROCHLORIDE 50 MG/1
50 TABLET, FILM COATED ORAL DAILY
COMMUNITY
Start: 2025-06-04

## 2025-06-19 RX ORDER — ACARBOSE 50 MG/1
50 TABLET ORAL
Qty: 270 TABLET | Refills: 0 | Status: SHIPPED | OUTPATIENT
Start: 2025-06-19 | End: 2025-09-17

## 2025-06-19 RX ORDER — ACARBOSE 50 MG/1
50 TABLET ORAL
Qty: 270 TABLET | Refills: 0 | Status: SHIPPED | OUTPATIENT
Start: 2025-06-19 | End: 2025-06-19

## 2025-06-19 RX ORDER — GABAPENTIN 100 MG/1
100 CAPSULE ORAL 2 TIMES DAILY
Qty: 180 CAPSULE | Refills: 0 | Status: SHIPPED | OUTPATIENT
Start: 2025-06-19 | End: 2025-09-17

## 2025-06-19 RX ORDER — OMEPRAZOLE 40 MG/1
40 CAPSULE, DELAYED RELEASE ORAL
Qty: 90 CAPSULE | Refills: 0 | Status: SHIPPED | OUTPATIENT
Start: 2025-06-19 | End: 2025-09-17

## 2025-06-19 RX ORDER — BUPROPION HYDROCHLORIDE 150 MG/1
150 TABLET, EXTENDED RELEASE ORAL 2 TIMES DAILY
Qty: 180 TABLET | Refills: 0 | Status: SHIPPED | OUTPATIENT
Start: 2025-06-19 | End: 2025-09-17

## 2025-06-19 RX ORDER — GABAPENTIN 100 MG/1
100 CAPSULE ORAL 2 TIMES DAILY
Qty: 180 CAPSULE | Refills: 0 | Status: SHIPPED | OUTPATIENT
Start: 2025-06-19 | End: 2025-06-19

## 2025-06-19 RX ORDER — METFORMIN HYDROCHLORIDE 1000 MG/1
1000 TABLET ORAL
Qty: 180 TABLET | Refills: 0 | Status: SHIPPED | OUTPATIENT
Start: 2025-06-19

## 2025-06-19 RX ORDER — GLIPIZIDE 5 MG/1
5 TABLET ORAL
Qty: 180 TABLET | Refills: 0 | Status: SHIPPED | OUTPATIENT
Start: 2025-06-19 | End: 2025-06-19

## 2025-06-19 RX ORDER — BUPROPION HYDROCHLORIDE 150 MG/1
150 TABLET, EXTENDED RELEASE ORAL 2 TIMES DAILY
Qty: 180 TABLET | Refills: 0 | Status: SHIPPED | OUTPATIENT
Start: 2025-06-19 | End: 2025-06-19

## 2025-06-19 ASSESSMENT — ENCOUNTER SYMPTOMS
CONSTIPATION: 0
SHORTNESS OF BREATH: 0
DIZZINESS: 0
FATIGUE: 1
PALPITATIONS: 0
POLYDIPSIA: 0
NAUSEA: 0
BLOOD IN STOOL: 0
DYSURIA: 0
DIFFICULTY URINATING: 0
VOMITING: 0
DIARRHEA: 0
MYALGIAS: 0
POLYPHAGIA: 0
DYSPHORIC MOOD: 0
ABDOMINAL PAIN: 0
SLEEP DISTURBANCE: 0
HEADACHES: 0

## 2025-06-19 ASSESSMENT — ACTIVITIES OF DAILY LIVING (ADL)
TAKING_MEDICATION: INDEPENDENT
GROCERY_SHOPPING: INDEPENDENT
DRESSING: INDEPENDENT
DOING_HOUSEWORK: INDEPENDENT
MANAGING_FINANCES: INDEPENDENT
BATHING: INDEPENDENT

## 2025-06-19 ASSESSMENT — PATIENT HEALTH QUESTIONNAIRE - PHQ9
2. FEELING DOWN, DEPRESSED OR HOPELESS: NOT AT ALL
SUM OF ALL RESPONSES TO PHQ9 QUESTIONS 1 AND 2: 0
1. LITTLE INTEREST OR PLEASURE IN DOING THINGS: NOT AT ALL

## 2025-06-19 NOTE — PROGRESS NOTES
Clinical Pharmacy Appointment    Patient ID: Morro Seo is a 60 y.o. male who presents for Diabetes    Pt is here for Follow Up.     Referring Provider: Lisseth Thomas DO  PCP: DO Lisseth áCrdenas DO  Last visit with PCP: 2025  Next visit with PCP: 2025    Cardiology: Dr. Núñez, 3/31/2026  Pulmonology: Dr. Brandon, 2025    Subjective     Interval History  Continue current regimen     HPI  Type II Diabetes  Current  Pharmacotherapy:   Acarbose 50 mg three times daily with meals  Metformin IR 1000 mg twice daily  Glipizide 5 mg twice daily   Trulicity 4.5 mg once weekly  Farxiga 10 mg daily     SECONDARY PREVENTION  - Statin? Atorvastatin 80 mg   LDL: 37   TC: 101  - ACE-I/ARB? No   UACR: 17   BP: 126/82  - Aspirin? Yes    -The ASCVD Risk score (Belem GARCIA, et al., 2019) failed to calculate for the following reasons:    Risk score cannot be calculated because patient has a medical history suggesting prior/existing ASCVD      Current monitoring regimen:   Patient is using: CGM    FBs    Hypoglycemia: 63 but reports no symptoms/signs     Pertinent PMH Review:  - PMH of Pancreatitis: n/a  - PMH/FH of Medullary Thyroid Cancer: n/a  - PMH of Retinopathy: n/a      Lifestyle Update:  He states with the weather getting warmer getting outside a little bit more. No changes in diet.    Drug Interactions  No relevant drug interactions were noted.    Objective   Allergies   Allergen Reactions    Jardiance [Empagliflozin] Rash    Penicillins Unknown     Unknown reaction as child    Sulfamethoxazole-Trimethoprim Rash     Social History     Social History Narrative    Not on file      Medication Review  Current Outpatient Medications   Medication Instructions    acarbose (PRECOSE) 50 mg, oral, 3 times daily (morning, midday, late afternoon)    albuterol 90 mcg/actuation inhaler 2 puffs, inhalation, Every 4 hours PRN    ammonium lactate (Lac-Hydrin) 12 % lotion USE 1 APPLICATION TO  THE AFFECTED AREA EVERY NIGHT    atorvastatin (LIPITOR) 80 mg, oral, Daily    blood sugar diagnostic (Blood Glucose Test) strip Test once daily in the morning    blood-glucose meter misc Use to test blood sugars once daily    budesonide-formoterol (Symbicort) 160-4.5 mcg/actuation inhaler 2 puffs, inhalation, 2 times daily RT, Rinse mouth with water after use to reduce aftertaste and incidence of candidiasis. Do not swallow.    buPROPion SR (WELLBUTRIN SR) 150 mg, oral, 2 times daily, Do not crush, chew, or split.    dapagliflozin propanediol (FARXIGA) 10 mg, oral, Daily    FreeStyle Aliza 3 Sensor device Use to check blood sugars daily. Change sensors every 14 days    gabapentin (NEURONTIN) 100 mg, oral, 2 times daily    glipiZIDE (GLUCOTROL) 5 mg, oral, 2 times daily before meals    ipratropium-albuteroL (Duo-Neb) 0.5-2.5 mg/3 mL nebulizer solution 3 mL, nebulization, Every 4 hours PRN    lancets misc Test once daily in the morning    losartan (COZAAR) 25 mg, oral, Daily    metFORMIN (GLUCOPHAGE) 1,000 mg, oral, 2 times daily (morning and late afternoon)    omeprazole (PRILOSEC) 40 mg, oral, Daily before breakfast, Do not crush or chew.    OneTouch Delica Plus Lancet 30 gauge misc TEST ONCE DAILY IN THE MORNING    potassium chloride CR 10 mEq ER tablet 10 mEq, oral, Daily    sertraline (ZOLOFT) 50 mg, Daily    spironolactone (ALDACTONE) 25 mg, oral, Daily    tiotropium (Spiriva Respimat) 2.5 mcg/actuation inhaler 2 puffs, inhalation, Daily    torsemide (DEMADEX) 20 mg, oral, Daily, as directed    Trulicity 4.5 mg, subcutaneous, Weekly      Vitals  BP Readings from Last 2 Encounters:   06/19/25 134/82   05/20/25 115/76     BMI Readings from Last 1 Encounters:   06/19/25 43.86 kg/m²      Labs  A1C  Lab Results   Component Value Date    HGBA1C 7.3 (H) 06/17/2025    HGBA1C 10.5 (H) 03/05/2025    HGBA1C 10.2 (H) 12/04/2024     BMP  Lab Results   Component Value Date    CALCIUM 10.4 (H) 06/17/2025     06/17/2025     K 4.4 06/17/2025    CO2 32 06/17/2025    CL 97 (L) 06/17/2025    BUN 23 06/17/2025    CREATININE 0.75 06/17/2025    EGFR 103 06/17/2025     LFTs  Lab Results   Component Value Date    ALT 26 06/17/2025    AST 20 06/17/2025    ALKPHOS 77 06/17/2025    BILITOT 1.2 06/17/2025     FLP  Lab Results   Component Value Date    TRIG 150 (H) 12/04/2024    CHOL 101 12/04/2024    LDLF 30 03/28/2022    LDLCALC 37 12/04/2024    HDL 34.5 12/04/2024     Urine Microalbumin  Lab Results   Component Value Date    MICROALBCREA 17 03/05/2025     Weight Management  Wt Readings from Last 3 Encounters:   06/19/25 135 kg (297 lb)   05/20/25 136 kg (300 lb)   04/21/25 138 kg (305 lb 3.6 oz)      There is no height or weight on file to calculate BMI.       Assessment/Plan   Problem List Items Addressed This Visit       Type 2 diabetes mellitus, without long-term current use of insulin (Multi)    Current Assessment & Plan   Patient's diabetes is currently uncontrolled, as shown by A1c of 7.3%, previously 10.5% (goal <7%).    Patient reports doing well on the current regimen with no reported side effects. Updated A1c shows significant improvement. He states that he has been able to resume exercising with the warmer weather and has since lost 6 pounds. He reports he would like to stay on current regimen. Will consider dose adjustments based on the next updated A1c results.     Plan:  CONTINUE Trulicity 4.5 mg weekly and all other medications as prescribed          Clinical Pharmacist follow-up: 9/5 @ 9:20 AM, Telehealth visit    Continue all meds under the continuation of care with the referring provider and clinical pharmacy team.    Lemuel Alanis, PharmD  PGY-1 Pharmacy Resident     Verbal consent to manage patient's drug therapy was obtained from the patient. They were informed they may decline to participate or withdraw from participation in pharmacy services at any time.

## 2025-06-19 NOTE — ASSESSMENT & PLAN NOTE
Orders:    Follow Up In Primary Care    Follow Up In Primary Care; Future    dulaglutide (Trulicity) 4.5 mg/0.5 mL pen injector; Inject 4.5 mg under the skin 1 (one) time per week.    metFORMIN (Glucophage) 1,000 mg tablet; Take 1 tablet (1,000 mg) by mouth 2 times daily (morning and late afternoon).

## 2025-06-19 NOTE — ASSESSMENT & PLAN NOTE
Orders:    Follow Up In Primary Care    Comprehensive Metabolic Panel; Future    Hemoglobin A1C; Future    Follow Up In Primary Care; Future    acarbose (Precose) 50 mg tablet; Take 1 tablet (50 mg) by mouth 3 times daily (morning, midday, late afternoon).    glipiZIDE (Glucotrol) 5 mg tablet; Take 1 tablet (5 mg) by mouth 2 times a day before meals.

## 2025-06-19 NOTE — PROGRESS NOTES
Subjective   Reason for Visit: Morro Seo is an 60 y.o. male here for a Medicare Wellness visit, CPE and multiple issues.     Past Medical, Surgical, and Family History reviewed and updated in chart.    Reviewed all medications by prescribing practitioner or clinical pharmacist (such as prescriptions, OTCs, herbal therapies and supplements) and documented in the medical record.    HPI    Patient Care Team:  Lisseth Thomas DO as PCP - General  Margi Joseph DO [facility]  Ricarda Wong, PharmD as Pharmacist (Pharmacy)   ROLO Pedersen-HEME/ONC  Terri Lucas/MD Aleksandr-pulmonology  Dr. Núñez-cardiology      DM: controlled. A1c 7.3(10.5)  GERD: controlled. No longer w/ hematemesis  BMI: high. Tryiing to work on diet.   Lipids; have been stable  Mood: fair control. Recently saw psych and likely will start new med   NAVYA: compliant but still feels like he can't breathe well at night.   LFTs: stable and nml    Pain: chronic diffuse-feet, knees, hips, neck, shoulders. Likely will be seeing pain management.   Opiates: denies    Review of Systems   Constitutional:  Positive for fatigue (intermittent).   HENT: Negative.     Eyes:  Negative for visual disturbance.   Respiratory:  Negative for shortness of breath.    Cardiovascular:  Negative for chest pain and palpitations.   Gastrointestinal:  Negative for abdominal pain, blood in stool, constipation, diarrhea, nausea and vomiting.   Endocrine: Negative for cold intolerance, heat intolerance, polydipsia, polyphagia and polyuria.   Genitourinary:  Negative for difficulty urinating and dysuria.   Musculoskeletal:  Negative for myalgias.   Skin:  Negative for rash.   Allergic/Immunologic: Negative for environmental allergies.   Neurological:  Negative for dizziness and headaches.   Psychiatric/Behavioral:  Negative for dysphoric mood and sleep disturbance.        Objective   Vitals:  /82   Pulse 68   Temp 36.4 °C (97.5 °F)   Ht 1.753 m (5'  "9\")   Wt 135 kg (297 lb)   SpO2 95%   BMI 43.86 kg/m²       Physical Exam  Vitals and nursing note reviewed.   Constitutional:       General: He is not in acute distress.     Appearance: Normal appearance. He is not toxic-appearing.   HENT:      Head: Normocephalic.      Right Ear: Tympanic membrane normal.      Left Ear: Tympanic membrane normal.      Nose: Nose normal.      Mouth/Throat:      Pharynx: Oropharynx is clear.   Eyes:      General: No scleral icterus.     Pupils: Pupils are equal, round, and reactive to light.   Neck:      Vascular: No carotid bruit.   Cardiovascular:      Rate and Rhythm: Normal rate and regular rhythm.      Heart sounds: No murmur heard.  Pulmonary:      Effort: Pulmonary effort is normal. No respiratory distress.      Breath sounds: Normal breath sounds.   Abdominal:      Palpations: Abdomen is soft.      Tenderness: There is no abdominal tenderness. There is no guarding.   Musculoskeletal:         General: No tenderness.      Cervical back: Neck supple.      Right lower leg: No edema.      Left lower leg: No edema.   Lymphadenopathy:      Cervical: No cervical adenopathy.   Skin:     General: Skin is warm.   Neurological:      General: No focal deficit present.      Mental Status: He is alert.      Cranial Nerves: No cranial nerve deficit.   Psychiatric:         Mood and Affect: Mood normal.         Assessment & Plan  Type 2 diabetes mellitus with hyperglycemia, without long-term current use of insulin    Orders:    Follow Up In Primary Care    Comprehensive Metabolic Panel; Future    Hemoglobin A1C; Future    Follow Up In Primary Care; Future    acarbose (Precose) 50 mg tablet; Take 1 tablet (50 mg) by mouth 3 times daily (morning, midday, late afternoon).    glipiZIDE (Glucotrol) 5 mg tablet; Take 1 tablet (5 mg) by mouth 2 times a day before meals.    Hyperlipidemia, unspecified hyperlipidemia type    Orders:    Follow Up In Primary Care    Comprehensive Metabolic Panel; " Future    Lipid Panel; Future    Follow Up In Primary Care; Future    Type 2 diabetes mellitus with other specified complication, without long-term current use of insulin    Orders:    Follow Up In Primary Care    Follow Up In Primary Care; Future    dulaglutide (Trulicity) 4.5 mg/0.5 mL pen injector; Inject 4.5 mg under the skin 1 (one) time per week.    metFORMIN (Glucophage) 1,000 mg tablet; Take 1 tablet (1,000 mg) by mouth 2 times daily (morning and late afternoon).    Other diabetic neurological complication associated with type 2 diabetes mellitus    Orders:    Comprehensive Metabolic Panel; Future    gabapentin (Neurontin) 100 mg capsule; Take 1 capsule (100 mg) by mouth 2 times a day.    Mild episode of recurrent major depressive disorder    Orders:    buPROPion SR (Wellbutrin SR) 150 mg 12 hr tablet; Take 1 tablet (150 mg) by mouth 2 times a day. Do not crush, chew, or split.    Hematemesis with nausea    Orders:    omeprazole (PriLOSEC) 40 mg DR capsule; Take 1 capsule (40 mg) by mouth once daily in the morning. Take before meals. Do not crush or chew.    Routine general medical examination at health care facility         NAVYA (obstructive sleep apnea)    Orders:    Referral to Adult Sleep Medicine; Future    Morbid obesity (Multi)         Body mass index (BMI) 40.0-44.9, adult (Multi)         Full code status    Orders:    Full code    Immunity status testing    Orders:    Hepatitis A Antibody, Total; Future    Hepatitis B Surface Antibody; Future    Hepatitis B surface antigen; Future    Fatty liver    Orders:    Hepatitis B surface antigen; Future           Cardiac Risk Assessment  15 minutes were spent discussing Cardiovascular risk and, if needed, lifestyle modifications were recommended, including nutritional choices, exercise, and elimination of habits contributing to risk.   Aspirin use/disuse was discussed following the guidelines below:  low dose ASA ( mg) should be considered:    If prior  Heart Attack/Stroke/Peripheral vascular disease:  Generally recommend daily low dose aspirin unless extremely high bleeding risk (e.g., gastrointestinal).    If no prior Heart Attack/Stroke/Peripheral vascular disease:              Age over 70: Do not use Aspirin for prevention    Age less than 70 and 10-year cardiovascular disease risk is >20%: use low dose Aspirin for prevention.                ASCVD risk counseling:  discussed ASCVD risk.  Aspirin not indicated at this time. Lifestyle modifications including nutritional choices, exercise and trying to eliminate habits contributing to risk were discussed. Patient agreeable to make efforts to continue to control their current cardiovascular risk factors. On statin     Discussed blood work including CMP, lipids, A1c and wellness issues. Reviewed screenings and immunizations. Recommendations given. Refilled meds. Declines all vaccines. Discussed risks.     Referred to sleep med. May need bipap

## 2025-06-19 NOTE — ASSESSMENT & PLAN NOTE
Orders:    Follow Up In Primary Care    Comprehensive Metabolic Panel; Future    Lipid Panel; Future    Follow Up In Primary Care; Future

## 2025-06-19 NOTE — PATIENT INSTRUCTIONS
Recommend a predominant low fat whole foods plant based diet.  Cut back on meat, dairy, processed carbs, salt and oils(especially palm and coconut). Increase fiber in your diet.  Decrease alcohol as much as possible if you drink. Recommend regular exercise most days of the week(goal up to 150min per week). Also recommend good sleep habits aiming for 7-8 hours per night.     Follow up with your specialists as scheduled    You were referred to sleep med    Continue your current meds    Return in 3 months, sooner if needed

## 2025-06-20 ENCOUNTER — APPOINTMENT (OUTPATIENT)
Dept: PHARMACY | Facility: HOSPITAL | Age: 60
End: 2025-06-20
Payer: MEDICARE

## 2025-06-20 DIAGNOSIS — E11.69 TYPE 2 DIABETES MELLITUS WITH OTHER SPECIFIED COMPLICATION, WITHOUT LONG-TERM CURRENT USE OF INSULIN: ICD-10-CM

## 2025-06-20 RX ORDER — BLOOD-GLUCOSE SENSOR
EACH MISCELLANEOUS
Qty: 2 EACH | Refills: 3 | Status: SHIPPED | OUTPATIENT
Start: 2025-06-20

## 2025-06-20 NOTE — ASSESSMENT & PLAN NOTE
Patient's diabetes is currently uncontrolled, as shown by A1c of 7.3%, previously 10.5% (goal <7%).    Patient reports doing well on the current regimen with no reported side effects. Updated A1c shows significant improvement. He states that he has been able to resume exercising with the warmer weather and has since lost 6 pounds. He reports he would like to stay on current regimen. Will consider dose adjustments based on the next updated A1c results.     Plan:  CONTINUE Trulicity 4.5 mg weekly and all other medications as prescribed

## 2025-06-24 ENCOUNTER — HOSPITAL ENCOUNTER (OUTPATIENT)
Dept: CARDIOLOGY | Facility: HOSPITAL | Age: 60
Discharge: HOME | End: 2025-06-24
Payer: MEDICARE

## 2025-06-24 DIAGNOSIS — I44.2 COMPLETE HEART BLOCK: ICD-10-CM

## 2025-06-24 DIAGNOSIS — Z95.0 PRESENCE OF CARDIAC PACEMAKER: ICD-10-CM

## 2025-06-24 DIAGNOSIS — I44.2 COMPLETE HEART BLOCK: Primary | ICD-10-CM

## 2025-06-27 ENCOUNTER — OFFICE VISIT (OUTPATIENT)
Dept: SLEEP MEDICINE | Facility: CLINIC | Age: 60
End: 2025-06-27
Payer: MEDICARE

## 2025-06-27 DIAGNOSIS — G47.33 OSA (OBSTRUCTIVE SLEEP APNEA): ICD-10-CM

## 2025-06-27 PROCEDURE — 99215 OFFICE O/P EST HI 40 MIN: CPT | Performed by: PSYCHIATRY & NEUROLOGY

## 2025-06-27 PROCEDURE — G2211 COMPLEX E/M VISIT ADD ON: HCPCS | Performed by: PSYCHIATRY & NEUROLOGY

## 2025-06-27 PROCEDURE — 1036F TOBACCO NON-USER: CPT | Performed by: PSYCHIATRY & NEUROLOGY

## 2025-06-27 PROCEDURE — 4010F ACE/ARB THERAPY RXD/TAKEN: CPT | Performed by: PSYCHIATRY & NEUROLOGY

## 2025-06-27 NOTE — PROGRESS NOTES
" Patient: Morro Seo    [id number]  : 1965 -- AGE 60 y.o.    Provider: Pradeep Gray MD     [facility]   Service Date: 2025              [facility]  Follow-up Note      Virtual or Telephone Consent  An interactive audio and video telecommunication system which permits real time communications between the patient (at the originating site) and provider (at the distant site) was utilized to provide this telehealth service.   Verbal consent was requested and obtained from Morro Seo on this date, 25 for a telehealth visit and the patient's location was confirmed at the time of the visit.  I verified the patient's identity and physical location in [place].  If this is a new patient to me, I informed the patient of my name and type of active [place] license that I hold.      HPI: Morro Seo is a 60 y.o. male with NAVYA and COPD on CPAP, RLS. He is here today for a follow up visit. He is new to me.    Per my review of the last sleep medicine clinic note, there was an updated sleep study ordered by Dr. Michaels on 2022, but study never was done.    He states that the reason he was asked to see us is because his PCP thinks he may need to be switched from CPAP to bilevel PAP. Some nights he wakes up in a panic feeling like he is not getting enough air. He uses his CPAP nightly. CPAP \"absolutely\" helps his sleep and he cannot sleep without it.    He is annoyed by having to wake up a few times in the night to urinate. He reports he has had insomnia for most of his life, typically sleeping from about 130-6 am, running on 4.5-5 hours of sleep per night. Retired now, but was able to function well working 12 hours per day.    Only once in a while he feels excessively sleepy.    Lost some weight. Quit smoking in 10/24.        PAP DEVICE   DME: [facility]  Type: Dreamstation CPAP - replaced about a year ago due to the Rosita recall  Settin-20 cm H2O, Flex " 2  Finding benefit: yes    Prior Sleep studies:   -NAVYA initially diagnosed in , no report on file  -CPAP titration 2018: weight 322 lbs. CPAP was started at 5-14 cm H2O. At CPAP 12 cm H2O, AHI 1.6, SpO2 naidr 84%. PLM index 7.4      REVIEW OF MACHINE DOWNLOAD: data obtained by having the patient scroll through the settings with my help  Date Range: last 30 days  Device: DreamStation  % Days used: not reported  % Days with Usage >= 4 hrs: not reported  Average usage days used: 7.4 h  Settin-20 cmH2O, flex 2  90th percentile pressure: not reported  Mask fit: 93%  Residual AHI: 2.7  Machine hours: 17,520; blower hours: 6,427.2      Problem List[1]  Medical History[2]  Surgical History[3]  Medications Ordered Prior to Encounter[4]    PHYSICAL EXAMINATION:   General: Awake. Alert. Comfortable. No apparent distress.   Speech: Normal.  Comprehension: Normal.  Mood: Stable.  Affect: Appropriate.  Pul:         Normal respiratory effort.   Abd:         Obese  Neuro: Alert, well-oriented. Cranial nerves II-XII grossly normal and symmetric.      LABS:  Component  Ref Range & Units 2 mo ago  (25) 8 mo ago  (10/1/24) 1 yr ago  (3/25/24) 1 yr ago  (11/15/23)   Ferritin  20 - 300 ng/mL 150 78 96 36     Component  Ref Range & Units 2 mo ago 8 mo ago 1 yr ago   Iron  35 - 150 ug/dL 69 64 77   UIBC  110 - 370 ug/dL 251 276 234   TIBC  240 - 445 ug/dL 320 340 311   % Saturation  25 - 45 % 22 Low  19 Low  25   CO2 on CMP from 25: 32  A1c from 25: 7.3%    PFTs 23:    ASSESSMENT AND PLAN: Mr. Morro Seo is a 60 y.o. male with NAVYA and COPD on autoCPAP, who sometimes wakes up in the night in a panic feeling like he is not getting enough air. He reports using CPAP nightly. He derives significant subjective benefit from treatment, his mask fits well with little excessive leak, and the residual machine-estimated AHI shows that his sleep apnea is well-controlled. He does not necessarily need bilevel PAP. He  and I decided to start with having his pressures adjusted to be in the upper range of what should be effective for him, rather than allowing a wider range of pressures. We did not have time today to discuss his history of RLS.    #NAVYA  -today we manually changed his pressure settings from 4-20 to 12-20 cm H2O, keep flex at 2   -we will request download report from [facility] in [place]  -pt will reach out to us if the new pressure settings are uncomfortable  -if pt does not tolerate CPAP, we will have him come into the sleep lab for CPAP/BPAP titration      All of the above was discussed with the patient in detail. He voiced an understanding of the above and was agreeable to proceed further as advised.     46 minutes were spent on this encounter, including time spent with the patient, time spent reviewing the chart, updating the chart as needed, and documenting.     FOLLOW UP: 2 months         [1]   Patient Active Problem List  Diagnosis    Hyperlipidemia    Allergic rhinitis    Anxiety state    Benign paroxysmal positional vertigo    Type 2 diabetes mellitus, without long-term current use of insulin (Multi)    COPD (chronic obstructive pulmonary disease) (Multi)    Depression    Essential hypertension    Left ventricular hypertrophy    Leg swelling    Morbid obesity (Multi)    Multiple pulmonary nodules    Nocturia    Obstructive sleep apnea    Osteoarthritis    Other peripheral vertigo, unspecified ear    Sensorineural hearing loss, asymmetrical    Status cardiac pacemaker    Vertigo    Wheezing on auscultation    Tiredness    Chronic diastolic (congestive) heart failure    Erythrocytosis    Cervicalgia    Bilateral shoulder pain    Poor posture    Hematemesis    Agatston coronary artery calcium score greater than 400    Abnormal dobutamine stress echocardiography    Generalized anxiety disorder   [2]   Past Medical History:  Diagnosis Date    Abnormal dobutamine stress echo 09/12/2024    Complete heart block 10/31/2023     Diverticulosis of intestine, part unspecified, without perforation or abscess without bleeding 01/30/2020    Diverticulosis    Moderate tobacco use disorder 05/09/2023    Personal history of other endocrine, nutritional and metabolic disease 01/30/2020    History of diabetic nephropathy    Personal history of other endocrine, nutritional and metabolic disease 01/30/2020    History of diabetes mellitus    Personal history of other specified conditions 05/04/2018    History of dyspnea    Personal history of pneumonia (recurrent) 05/08/2018    History of pneumonia    Solitary pulmonary nodule 05/04/2018    Lung nodule    Unspecified open wound of unspecified toe(s) without damage to nail, sequela 08/11/2020    Non-healing open wound of toe, sequela   [3]   Past Surgical History:  Procedure Laterality Date    CARDIAC CATHETERIZATION N/A 10/24/2024    Procedure: Left Heart Cath;  Surgeon: Cynthia Davis MD;  Location: [facility] Cardiac Cath Lab;  Service: Cardiovascular;  Laterality: N/A;    OTHER SURGICAL HISTORY  04/28/2020    Permanent pacemaker insertion    OTHER SURGICAL HISTORY  04/28/2020    Leg surgery    OTHER SURGICAL HISTORY  04/28/2020    Knee surgery    OTHER SURGICAL HISTORY  04/28/2020    Tonsillectomy with adenoidectomy    OTHER SURGICAL HISTORY  07/23/2020    Foot surgery   [4]   Current Outpatient Medications on File Prior to Visit   Medication Sig Dispense Refill    acarbose (Precose) 50 mg tablet Take 1 tablet (50 mg) by mouth 3 times daily (morning, midday, late afternoon). 270 tablet 0    albuterol 90 mcg/actuation inhaler Inhale 2 puffs every 4 hours if needed for shortness of breath. 54 g 3    ammonium lactate (Lac-Hydrin) 12 % lotion USE 1 APPLICATION TO THE AFFECTED AREA EVERY NIGHT      atorvastatin (Lipitor) 80 mg tablet Take 1 tablet (80 mg) by mouth once daily. 90 tablet 1    blood sugar diagnostic (Blood Glucose Test) strip Test once daily in the morning 100 each 3    blood-glucose meter  misc Use to test blood sugars once daily 1 each 0    budesonide-formoterol (Symbicort) 160-4.5 mcg/actuation inhaler Inhale 2 puffs 2 times a day. Rinse mouth with water after use to reduce aftertaste and incidence of candidiasis. Do not swallow. 30.6 g 3    buPROPion SR (Wellbutrin SR) 150 mg 12 hr tablet Take 1 tablet (150 mg) by mouth 2 times a day. Do not crush, chew, or split. 180 tablet 0    dapagliflozin propanediol (Farxiga) 10 mg Take 1 tablet (10 mg) by mouth once daily. 30 tablet 11    dulaglutide (Trulicity) 4.5 mg/0.5 mL pen injector Inject 4.5 mg under the skin 1 (one) time per week. 6 mL 0    FreeStyle Aliza 3 Sensor device Use to check blood sugars daily. Change sensors every 14 days 2 each 3    gabapentin (Neurontin) 100 mg capsule Take 1 capsule (100 mg) by mouth 2 times a day. 180 capsule 0    glipiZIDE (Glucotrol) 5 mg tablet Take 1 tablet (5 mg) by mouth 2 times a day before meals. 180 tablet 0    ipratropium-albuteroL (Duo-Neb) 0.5-2.5 mg/3 mL nebulizer solution Take 3 mL by nebulization every 4 hours if needed for wheezing. 1080 mL 3    lancets misc Test once daily in the morning 100 each 3    losartan (Cozaar) 25 mg tablet Take 1 tablet (25 mg) by mouth once daily. 90 tablet 1    metFORMIN (Glucophage) 1,000 mg tablet Take 1 tablet (1,000 mg) by mouth 2 times daily (morning and late afternoon). 180 tablet 0    omeprazole (PriLOSEC) 40 mg DR capsule Take 1 capsule (40 mg) by mouth once daily in the morning. Take before meals. Do not crush or chew. 90 capsule 0    OneTouch Delica Plus Lancet 30 gauge misc TEST ONCE DAILY IN THE MORNING      potassium chloride CR 10 mEq ER tablet Take 1 tablet (10 mEq) by mouth once daily. 90 tablet 1    sertraline (Zoloft) 50 mg tablet Take 1 tablet (50 mg) by mouth once daily.      spironolactone (Aldactone) 25 mg tablet Take 1 tablet (25 mg) by mouth once daily. 90 tablet 1    tiotropium (Spiriva Respimat) 2.5 mcg/actuation inhaler Inhale 2 puffs once daily.  12 g 3    torsemide (Demadex) 20 mg tablet Take 1 tablet (20 mg) by mouth once daily. as directed 90 tablet 1     No current facility-administered medications on file prior to visit.

## 2025-07-03 ENCOUNTER — OFFICE VISIT (OUTPATIENT)
Dept: PRIMARY CARE | Facility: CLINIC | Age: 60
End: 2025-07-03
Payer: MEDICARE

## 2025-07-03 VITALS
TEMPERATURE: 97.6 F | OXYGEN SATURATION: 100 % | HEART RATE: 68 BPM | SYSTOLIC BLOOD PRESSURE: 114 MMHG | DIASTOLIC BLOOD PRESSURE: 70 MMHG | BODY MASS INDEX: 41.76 KG/M2 | WEIGHT: 282.8 LBS

## 2025-07-03 DIAGNOSIS — F41.9 ANXIETY: ICD-10-CM

## 2025-07-03 DIAGNOSIS — R00.2 PALPITATIONS: Primary | ICD-10-CM

## 2025-07-03 RX ORDER — SERTRALINE HYDROCHLORIDE 50 MG/1
50 TABLET, FILM COATED ORAL DAILY
Qty: 30 TABLET | Refills: 1 | Status: SHIPPED | OUTPATIENT
Start: 2025-07-03 | End: 2025-09-01

## 2025-07-03 ASSESSMENT — PATIENT HEALTH QUESTIONNAIRE - PHQ9
7. TROUBLE CONCENTRATING ON THINGS, SUCH AS READING THE NEWSPAPER OR WATCHING TELEVISION: NEARLY EVERY DAY
SUM OF ALL RESPONSES TO PHQ9 QUESTIONS 1 AND 2: 3
3. TROUBLE FALLING OR STAYING ASLEEP OR SLEEPING TOO MUCH: NEARLY EVERY DAY
4. FEELING TIRED OR HAVING LITTLE ENERGY: SEVERAL DAYS
6. FEELING BAD ABOUT YOURSELF - OR THAT YOU ARE A FAILURE OR HAVE LET YOURSELF OR YOUR FAMILY DOWN: NEARLY EVERY DAY
8. MOVING OR SPEAKING SO SLOWLY THAT OTHER PEOPLE COULD HAVE NOTICED. OR THE OPPOSITE, BEING SO FIGETY OR RESTLESS THAT YOU HAVE BEEN MOVING AROUND A LOT MORE THAN USUAL: NEARLY EVERY DAY
1. LITTLE INTEREST OR PLEASURE IN DOING THINGS: SEVERAL DAYS
5. POOR APPETITE OR OVEREATING: NEARLY EVERY DAY
SUM OF ALL RESPONSES TO PHQ QUESTIONS 1-9: 19
9. THOUGHTS THAT YOU WOULD BE BETTER OFF DEAD, OR OF HURTING YOURSELF: NOT AT ALL
2. FEELING DOWN, DEPRESSED OR HOPELESS: MORE THAN HALF THE DAYS

## 2025-07-03 ASSESSMENT — ENCOUNTER SYMPTOMS
VOMITING: 0
FATIGUE: 1
DIFFICULTY URINATING: 0
DYSURIA: 0
SLEEP DISTURBANCE: 0
NAUSEA: 0
POLYPHAGIA: 0
ABDOMINAL PAIN: 0
DIARRHEA: 1
PALPITATIONS: 0
CONSTIPATION: 0
POLYDIPSIA: 0
DIZZINESS: 0
DYSPHORIC MOOD: 0
HEADACHES: 0
MYALGIAS: 0
BLOOD IN STOOL: 0
SHORTNESS OF BREATH: 0

## 2025-07-03 NOTE — PROGRESS NOTES
"Subjective   Patient ID: Morro Seo is a 60 y.o. male who presents for Anxiety (Discuss worsening anxiety ) and multiple issues    HPI     Mood: worsening.  Recently  from wife but has been having marital issues for a \"long time\".  Patient currently staying with son since Monday.  Reports increased anxiety, decreased appetite.  Has lost 15 pounds in the past 2 weeks.  18 since May.  Not sleeping well.  [facility]  Has follow-up later this month.  Never started sertraline because he was in [place].  Needs new prescription. remains on Wellbutrin    Palpitations: Onset for several weeks.  Has been under significant stress.  No associated chest pains.  Feels like his heart is \"pounding\".  Unsure if it is related to mood above.    DM: BS occ running low into 50s. Random. Poor appetite as above.     Overall feeling tired.      Review of Systems   Constitutional:  Positive for fatigue.   Eyes:  Negative for visual disturbance.   Respiratory:  Negative for shortness of breath.    Cardiovascular:  Negative for chest pain and palpitations.   Gastrointestinal:  Positive for diarrhea (Chronically loose). Negative for abdominal pain, blood in stool, constipation, nausea and vomiting.   Endocrine: Negative for cold intolerance, heat intolerance, polydipsia, polyphagia and polyuria.   Genitourinary:  Negative for difficulty urinating and dysuria.   Musculoskeletal:  Negative for myalgias.   Skin:  Negative for rash.   Neurological:  Negative for dizziness and headaches.   Psychiatric/Behavioral:  Negative for dysphoric mood and sleep disturbance.        Objective   /70   Pulse 68   Temp 36.4 °C (97.6 °F)   Wt 128 kg (282 lb 12.8 oz)   SpO2 100%   BMI 41.76 kg/m²     Physical Exam  Vitals and nursing note reviewed.   Constitutional:       General: He is not in acute distress.     Appearance: Normal appearance. He is not toxic-appearing.   HENT:      Head: " Normocephalic.      Mouth/Throat:      Pharynx: Oropharynx is clear.   Eyes:      General: No scleral icterus.     Pupils: Pupils are equal, round, and reactive to light.   Cardiovascular:      Rate and Rhythm: Normal rate and regular rhythm.      Heart sounds: No murmur heard.  Pulmonary:      Effort: Pulmonary effort is normal. No respiratory distress.      Breath sounds: Normal breath sounds.   Abdominal:      Palpations: Abdomen is soft.      Tenderness: There is no abdominal tenderness. There is no guarding.   Musculoskeletal:         General: No tenderness.      Right lower leg: No edema.      Left lower leg: No edema.   Skin:     General: Skin is warm.   Neurological:      General: No focal deficit present.      Mental Status: He is alert.      Cranial Nerves: No cranial nerve deficit.   Psychiatric:         Mood and Affect: Mood normal.         Assessment/Plan   Problem List Items Addressed This Visit    None  Visit Diagnoses         Codes      Palpitations    -  Primary R00.2    Relevant Orders    ECG 12 Lead (Completed)    Comprehensive Metabolic Panel    TSH with reflex to Free T4 if abnormal    CBC and Auto Differential      Anxiety     F41.9    Relevant Medications    sertraline (Zoloft) 50 mg tablet    Other Relevant Orders    Comprehensive Metabolic Panel    TSH with reflex to Free T4 if abnormal    CBC and Auto Differential          Reviewed prior blood work including CMP, A1c and prior EKG.  Referred for repeat blood work.  Discussed side effects of SSRI.  Recommendations given    Time Spent  Prep time on day of patient encounter: 0 minutes  Time spent directly with patient, family or caregiver: 35 minutes  Additional Time Spent on Patient Care Activities: 0 minutes  Documentation Time: 5 minutes  Other Time Spent: 0 minutes  Total: 40 minutes      Over half the time spent counseling patient

## 2025-07-03 NOTE — PATIENT INSTRUCTIONS
Referred for blood work.    Start Zoloft.  [facility]    Go to the ER if any of your symptoms are significantly worsening.     Keep track of your home blood pressures.  Log your blood pressure for 10 days. Goal is <130/80.  Notify the office/cardiology if your blood pressure is consistently high or low (<110/70) for possible medication adjustment.     Hold metformin if diarrhea persistent or worsening and monitor blood sugar    Hold glipizide if Blood sugars remain low     Return in 3-4 weeks for recheck, sooner if needed

## 2025-07-04 LAB
ALBUMIN SERPL-MCNC: 4.7 G/DL (ref 3.6–5.1)
ALP SERPL-CCNC: 76 U/L (ref 35–144)
ALT SERPL-CCNC: 24 U/L (ref 9–46)
ANION GAP SERPL CALCULATED.4IONS-SCNC: 11 MMOL/L (CALC) (ref 7–17)
AST SERPL-CCNC: 21 U/L (ref 10–35)
BASOPHILS # BLD AUTO: 33 CELLS/UL (ref 0–200)
BASOPHILS NFR BLD AUTO: 0.4 %
BILIRUB SERPL-MCNC: 1.4 MG/DL (ref 0.2–1.2)
BUN SERPL-MCNC: 16 MG/DL (ref 7–25)
CALCIUM SERPL-MCNC: 10.2 MG/DL (ref 8.6–10.3)
CHLORIDE SERPL-SCNC: 100 MMOL/L (ref 98–110)
CO2 SERPL-SCNC: 27 MMOL/L (ref 20–32)
CREAT SERPL-MCNC: 0.89 MG/DL (ref 0.7–1.35)
EGFRCR SERPLBLD CKD-EPI 2021: 98 ML/MIN/1.73M2
EOSINOPHIL # BLD AUTO: 74 CELLS/UL (ref 15–500)
EOSINOPHIL NFR BLD AUTO: 0.9 %
ERYTHROCYTE [DISTWIDTH] IN BLOOD BY AUTOMATED COUNT: 12.8 % (ref 11–15)
GLUCOSE SERPL-MCNC: 93 MG/DL (ref 65–139)
HCT VFR BLD AUTO: 45.4 % (ref 38.5–50)
HGB BLD-MCNC: 15.2 G/DL (ref 13.2–17.1)
LYMPHOCYTES # BLD AUTO: 1230 CELLS/UL (ref 850–3900)
LYMPHOCYTES NFR BLD AUTO: 15 %
MCH RBC QN AUTO: 29.9 PG (ref 27–33)
MCHC RBC AUTO-ENTMCNC: 33.5 G/DL (ref 32–36)
MCV RBC AUTO: 89.4 FL (ref 80–100)
MONOCYTES # BLD AUTO: 640 CELLS/UL (ref 200–950)
MONOCYTES NFR BLD AUTO: 7.8 %
NEUTROPHILS # BLD AUTO: 6224 CELLS/UL (ref 1500–7800)
NEUTROPHILS NFR BLD AUTO: 75.9 %
PLATELET # BLD AUTO: 223 THOUSAND/UL (ref 140–400)
PMV BLD REES-ECKER: 10.6 FL (ref 7.5–12.5)
POTASSIUM SERPL-SCNC: 4.2 MMOL/L (ref 3.5–5.3)
PROT SERPL-MCNC: 7.1 G/DL (ref 6.1–8.1)
RBC # BLD AUTO: 5.08 MILLION/UL (ref 4.2–5.8)
SODIUM SERPL-SCNC: 138 MMOL/L (ref 135–146)
TSH SERPL-ACNC: 1.23 MIU/L (ref 0.4–4.5)
WBC # BLD AUTO: 8.2 THOUSAND/UL (ref 3.8–10.8)

## 2025-07-16 DIAGNOSIS — E11.69 TYPE 2 DIABETES MELLITUS WITH OTHER SPECIFIED COMPLICATION, WITHOUT LONG-TERM CURRENT USE OF INSULIN: ICD-10-CM

## 2025-07-16 RX ORDER — BLOOD-GLUCOSE SENSOR
EACH MISCELLANEOUS
Qty: 6 EACH | Refills: 3 | Status: SHIPPED | OUTPATIENT
Start: 2025-07-16

## 2025-07-16 NOTE — TELEPHONE ENCOUNTER
Patient left voicemail requesting refills on his Freestyle Aliza 3 Sensors.     Refills sent today and patient notified.    Ricarda Wong, PharmD  Clinical Pharmacy Specialist

## 2025-07-22 ENCOUNTER — APPOINTMENT (OUTPATIENT)
Dept: CARDIOLOGY | Facility: HOSPITAL | Age: 60
End: 2025-07-22
Payer: MEDICARE

## 2025-07-23 ENCOUNTER — HOSPITAL ENCOUNTER (OUTPATIENT)
Dept: CARDIOLOGY | Facility: HOSPITAL | Age: 60
Discharge: HOME | End: 2025-07-23
Payer: MEDICARE

## 2025-07-23 DIAGNOSIS — Z95.0 PRESENCE OF CARDIAC PACEMAKER: ICD-10-CM

## 2025-07-23 DIAGNOSIS — I44.2 COMPLETE HEART BLOCK: ICD-10-CM

## 2025-07-23 PROCEDURE — 93296 REM INTERROG EVL PM/IDS: CPT

## 2025-07-31 ENCOUNTER — APPOINTMENT (OUTPATIENT)
Dept: RADIOLOGY | Facility: HOSPITAL | Age: 60
DRG: 617 | End: 2025-07-31
Payer: MEDICARE

## 2025-07-31 ENCOUNTER — HOSPITAL ENCOUNTER (INPATIENT)
Facility: HOSPITAL | Age: 60
DRG: 617 | End: 2025-07-31
Attending: EMERGENCY MEDICINE | Admitting: INTERNAL MEDICINE
Payer: MEDICARE

## 2025-07-31 ENCOUNTER — TELEPHONE (OUTPATIENT)
Dept: PRIMARY CARE | Facility: CLINIC | Age: 60
End: 2025-07-31

## 2025-07-31 DIAGNOSIS — I73.9 PERIPHERAL VASCULAR DISEASE, UNSPECIFIED: ICD-10-CM

## 2025-07-31 DIAGNOSIS — M79.89 LEG SWELLING: ICD-10-CM

## 2025-07-31 DIAGNOSIS — M86.171: ICD-10-CM

## 2025-07-31 DIAGNOSIS — M86.8X7 OTHER OSTEOMYELITIS OF RIGHT FOOT: Primary | ICD-10-CM

## 2025-07-31 DIAGNOSIS — L97.514 DIABETIC ULCER OF TOE OF RIGHT FOOT ASSOCIATED WITH TYPE 2 DIABETES MELLITUS, WITH NECROSIS OF BONE: ICD-10-CM

## 2025-07-31 DIAGNOSIS — E11.621 DIABETIC ULCER OF TOE OF RIGHT FOOT ASSOCIATED WITH TYPE 2 DIABETES MELLITUS, WITH NECROSIS OF BONE: ICD-10-CM

## 2025-07-31 LAB
ALBUMIN SERPL BCP-MCNC: 4.5 G/DL (ref 3.4–5)
ALP SERPL-CCNC: 83 U/L (ref 33–136)
ALT SERPL W P-5'-P-CCNC: 23 U/L (ref 10–52)
ANION GAP SERPL CALC-SCNC: 11 MMOL/L (ref 10–20)
AST SERPL W P-5'-P-CCNC: 20 U/L (ref 9–39)
BASOPHILS # BLD AUTO: 0.03 X10*3/UL (ref 0–0.1)
BASOPHILS NFR BLD AUTO: 0.3 %
BILIRUB SERPL-MCNC: 1 MG/DL (ref 0–1.2)
BUN SERPL-MCNC: 11 MG/DL (ref 6–23)
CALCIUM SERPL-MCNC: 10.1 MG/DL (ref 8.6–10.3)
CHLORIDE SERPL-SCNC: 101 MMOL/L (ref 98–107)
CO2 SERPL-SCNC: 31 MMOL/L (ref 21–32)
CREAT SERPL-MCNC: 0.85 MG/DL (ref 0.5–1.3)
CRP SERPL-MCNC: 0.91 MG/DL
EGFRCR SERPLBLD CKD-EPI 2021: >90 ML/MIN/1.73M*2
EOSINOPHIL # BLD AUTO: 0.09 X10*3/UL (ref 0–0.7)
EOSINOPHIL NFR BLD AUTO: 1 %
ERYTHROCYTE [DISTWIDTH] IN BLOOD BY AUTOMATED COUNT: 14.3 % (ref 11.5–14.5)
ERYTHROCYTE [SEDIMENTATION RATE] IN BLOOD BY WESTERGREN METHOD: 19 MM/H (ref 0–20)
GLUCOSE BLD MANUAL STRIP-MCNC: 160 MG/DL (ref 74–99)
GLUCOSE BLD MANUAL STRIP-MCNC: 184 MG/DL (ref 74–99)
GLUCOSE SERPL-MCNC: 81 MG/DL (ref 74–99)
HCT VFR BLD AUTO: 44.4 % (ref 41–52)
HGB BLD-MCNC: 15 G/DL (ref 13.5–17.5)
IMM GRANULOCYTES # BLD AUTO: 0.04 X10*3/UL (ref 0–0.7)
IMM GRANULOCYTES NFR BLD AUTO: 0.4 % (ref 0–0.9)
LYMPHOCYTES # BLD AUTO: 1.09 X10*3/UL (ref 1.2–4.8)
LYMPHOCYTES NFR BLD AUTO: 11.7 %
MCH RBC QN AUTO: 30.5 PG (ref 26–34)
MCHC RBC AUTO-ENTMCNC: 33.8 G/DL (ref 32–36)
MCV RBC AUTO: 90 FL (ref 80–100)
MONOCYTES # BLD AUTO: 0.5 X10*3/UL (ref 0.1–1)
MONOCYTES NFR BLD AUTO: 5.4 %
NEUTROPHILS # BLD AUTO: 7.59 X10*3/UL (ref 1.2–7.7)
NEUTROPHILS NFR BLD AUTO: 81.2 %
NRBC BLD-RTO: 0 /100 WBCS (ref 0–0)
PLATELET # BLD AUTO: 250 X10*3/UL (ref 150–450)
POTASSIUM SERPL-SCNC: 4.1 MMOL/L (ref 3.5–5.3)
PROT SERPL-MCNC: 7.6 G/DL (ref 6.4–8.2)
RBC # BLD AUTO: 4.92 X10*6/UL (ref 4.5–5.9)
SODIUM SERPL-SCNC: 139 MMOL/L (ref 136–145)
WBC # BLD AUTO: 9.3 X10*3/UL (ref 4.4–11.3)

## 2025-07-31 PROCEDURE — 2500000001 HC RX 250 WO HCPCS SELF ADMINISTERED DRUGS (ALT 637 FOR MEDICARE OP): Performed by: NURSE PRACTITIONER

## 2025-07-31 PROCEDURE — 1210000001 HC SEMI-PRIVATE ROOM DAILY

## 2025-07-31 PROCEDURE — 2500000004 HC RX 250 GENERAL PHARMACY W/ HCPCS (ALT 636 FOR OP/ED): Performed by: NURSE PRACTITIONER

## 2025-07-31 PROCEDURE — 36415 COLL VENOUS BLD VENIPUNCTURE: CPT | Performed by: NURSE PRACTITIONER

## 2025-07-31 PROCEDURE — 96375 TX/PRO/DX INJ NEW DRUG ADDON: CPT

## 2025-07-31 PROCEDURE — 84075 ASSAY ALKALINE PHOSPHATASE: CPT | Performed by: NURSE PRACTITIONER

## 2025-07-31 PROCEDURE — 73700 CT LOWER EXTREMITY W/O DYE: CPT | Mod: RT

## 2025-07-31 PROCEDURE — 73700 CT LOWER EXTREMITY W/O DYE: CPT | Mod: RIGHT SIDE | Performed by: RADIOLOGY

## 2025-07-31 PROCEDURE — 2500000005 HC RX 250 GENERAL PHARMACY W/O HCPCS: Performed by: NURSE PRACTITIONER

## 2025-07-31 PROCEDURE — 99285 EMERGENCY DEPT VISIT HI MDM: CPT | Mod: 25 | Performed by: EMERGENCY MEDICINE

## 2025-07-31 PROCEDURE — 99223 1ST HOSP IP/OBS HIGH 75: CPT | Performed by: NURSE PRACTITIONER

## 2025-07-31 PROCEDURE — 82947 ASSAY GLUCOSE BLOOD QUANT: CPT

## 2025-07-31 PROCEDURE — 87075 CULTR BACTERIA EXCEPT BLOOD: CPT | Mod: PORLAB | Performed by: NURSE PRACTITIONER

## 2025-07-31 PROCEDURE — 93971 EXTREMITY STUDY: CPT

## 2025-07-31 PROCEDURE — 86140 C-REACTIVE PROTEIN: CPT | Performed by: NURSE PRACTITIONER

## 2025-07-31 PROCEDURE — 96365 THER/PROPH/DIAG IV INF INIT: CPT

## 2025-07-31 PROCEDURE — 85652 RBC SED RATE AUTOMATED: CPT | Performed by: NURSE PRACTITIONER

## 2025-07-31 PROCEDURE — 93971 EXTREMITY STUDY: CPT | Performed by: RADIOLOGY

## 2025-07-31 PROCEDURE — 85025 COMPLETE CBC W/AUTO DIFF WBC: CPT | Performed by: NURSE PRACTITIONER

## 2025-07-31 RX ORDER — CEFEPIME HYDROCHLORIDE 2 G/50ML
2 INJECTION, SOLUTION INTRAVENOUS EVERY 8 HOURS
Status: DISCONTINUED | OUTPATIENT
Start: 2025-07-31 | End: 2025-08-01

## 2025-07-31 RX ORDER — CEFEPIME HYDROCHLORIDE 2 G/50ML
2 INJECTION, SOLUTION INTRAVENOUS ONCE
Status: COMPLETED | OUTPATIENT
Start: 2025-07-31 | End: 2025-07-31

## 2025-07-31 RX ORDER — LOSARTAN POTASSIUM 25 MG/1
25 TABLET ORAL DAILY
Status: DISCONTINUED | OUTPATIENT
Start: 2025-07-31 | End: 2025-08-04 | Stop reason: HOSPADM

## 2025-07-31 RX ORDER — ACARBOSE 25 MG/1
50 TABLET ORAL
Status: DISCONTINUED | OUTPATIENT
Start: 2025-07-31 | End: 2025-08-04 | Stop reason: HOSPADM

## 2025-07-31 RX ORDER — ATORVASTATIN CALCIUM 40 MG/1
80 TABLET, FILM COATED ORAL NIGHTLY
Status: DISCONTINUED | OUTPATIENT
Start: 2025-07-31 | End: 2025-08-04 | Stop reason: HOSPADM

## 2025-07-31 RX ORDER — VANCOMYCIN HYDROCHLORIDE 1 G/20ML
INJECTION, POWDER, LYOPHILIZED, FOR SOLUTION INTRAVENOUS DAILY PRN
Status: DISCONTINUED | OUTPATIENT
Start: 2025-07-31 | End: 2025-08-01

## 2025-07-31 RX ORDER — ACETAMINOPHEN 325 MG/1
650 TABLET ORAL EVERY 4 HOURS PRN
Status: DISCONTINUED | OUTPATIENT
Start: 2025-07-31 | End: 2025-08-04 | Stop reason: HOSPADM

## 2025-07-31 RX ORDER — TORSEMIDE 20 MG/1
20 TABLET ORAL DAILY
Status: DISCONTINUED | OUTPATIENT
Start: 2025-07-31 | End: 2025-08-04 | Stop reason: HOSPADM

## 2025-07-31 RX ORDER — POTASSIUM CHLORIDE 750 MG/1
10 TABLET, FILM COATED, EXTENDED RELEASE ORAL DAILY
Status: DISCONTINUED | OUTPATIENT
Start: 2025-07-31 | End: 2025-08-04 | Stop reason: HOSPADM

## 2025-07-31 RX ORDER — GABAPENTIN 100 MG/1
100 CAPSULE ORAL 2 TIMES DAILY
Status: DISCONTINUED | OUTPATIENT
Start: 2025-07-31 | End: 2025-08-04 | Stop reason: HOSPADM

## 2025-07-31 RX ORDER — ENOXAPARIN SODIUM 100 MG/ML
40 INJECTION SUBCUTANEOUS DAILY
Status: DISCONTINUED | OUTPATIENT
Start: 2025-07-31 | End: 2025-08-04 | Stop reason: HOSPADM

## 2025-07-31 RX ORDER — ARIPIPRAZOLE 5 MG/1
5 TABLET ORAL DAILY
COMMUNITY

## 2025-07-31 RX ORDER — ALBUTEROL SULFATE 90 UG/1
2 INHALANT RESPIRATORY (INHALATION) EVERY 4 HOURS PRN
Status: DISCONTINUED | OUTPATIENT
Start: 2025-07-31 | End: 2025-08-04 | Stop reason: HOSPADM

## 2025-07-31 RX ORDER — PANTOPRAZOLE SODIUM 40 MG/1
40 TABLET, DELAYED RELEASE ORAL DAILY
Status: DISCONTINUED | OUTPATIENT
Start: 2025-07-31 | End: 2025-08-04 | Stop reason: HOSPADM

## 2025-07-31 RX ORDER — BUPROPION HYDROCHLORIDE 150 MG/1
150 TABLET, EXTENDED RELEASE ORAL 2 TIMES DAILY
Status: DISCONTINUED | OUTPATIENT
Start: 2025-07-31 | End: 2025-08-04 | Stop reason: HOSPADM

## 2025-07-31 RX ORDER — METRONIDAZOLE 500 MG/100ML
500 INJECTION, SOLUTION INTRAVENOUS EVERY 8 HOURS
Status: DISCONTINUED | OUTPATIENT
Start: 2025-07-31 | End: 2025-08-01

## 2025-07-31 RX ORDER — SPIRONOLACTONE 25 MG/1
25 TABLET ORAL DAILY
Status: DISCONTINUED | OUTPATIENT
Start: 2025-07-31 | End: 2025-08-04 | Stop reason: HOSPADM

## 2025-07-31 RX ORDER — ARIPIPRAZOLE 5 MG/1
5 TABLET ORAL DAILY
Status: DISCONTINUED | OUTPATIENT
Start: 2025-07-31 | End: 2025-08-04 | Stop reason: HOSPADM

## 2025-07-31 RX ORDER — METRONIDAZOLE 500 MG/100ML
500 INJECTION, SOLUTION INTRAVENOUS ONCE
Status: COMPLETED | OUTPATIENT
Start: 2025-07-31 | End: 2025-07-31

## 2025-07-31 RX ORDER — FLUTICASONE FUROATE AND VILANTEROL 200; 25 UG/1; UG/1
1 POWDER RESPIRATORY (INHALATION)
Status: DISCONTINUED | OUTPATIENT
Start: 2025-07-31 | End: 2025-08-04 | Stop reason: HOSPADM

## 2025-07-31 RX ORDER — DEXTROSE 50 % IN WATER (D50W) INTRAVENOUS SYRINGE
12.5
Status: DISCONTINUED | OUTPATIENT
Start: 2025-07-31 | End: 2025-08-04 | Stop reason: HOSPADM

## 2025-07-31 RX ORDER — AMOXICILLIN 250 MG
1 CAPSULE ORAL NIGHTLY
Status: DISCONTINUED | OUTPATIENT
Start: 2025-07-31 | End: 2025-08-04 | Stop reason: HOSPADM

## 2025-07-31 RX ORDER — DEXTROSE 50 % IN WATER (D50W) INTRAVENOUS SYRINGE
25
Status: DISCONTINUED | OUTPATIENT
Start: 2025-07-31 | End: 2025-08-04 | Stop reason: SDUPTHER

## 2025-07-31 RX ORDER — BUDESONIDE AND FORMOTEROL FUMARATE DIHYDRATE 160; 4.5 UG/1; UG/1
2 AEROSOL RESPIRATORY (INHALATION)
Status: DISCONTINUED | OUTPATIENT
Start: 2025-07-31 | End: 2025-07-31 | Stop reason: ALTCHOICE

## 2025-07-31 RX ORDER — SERTRALINE HYDROCHLORIDE 50 MG/1
50 TABLET, FILM COATED ORAL DAILY
Status: DISCONTINUED | OUTPATIENT
Start: 2025-07-31 | End: 2025-08-04 | Stop reason: HOSPADM

## 2025-07-31 RX ORDER — VANCOMYCIN HYDROCHLORIDE 1.5 G/300ML
1500 INJECTION, SOLUTION INTRAVITREAL EVERY 12 HOURS
Status: DISCONTINUED | OUTPATIENT
Start: 2025-08-01 | End: 2025-08-01

## 2025-07-31 RX ORDER — INSULIN LISPRO 100 [IU]/ML
0-5 INJECTION, SOLUTION INTRAVENOUS; SUBCUTANEOUS
Status: DISCONTINUED | OUTPATIENT
Start: 2025-07-31 | End: 2025-08-04 | Stop reason: HOSPADM

## 2025-07-31 RX ADMIN — ACETAMINOPHEN 650 MG: 325 TABLET ORAL at 20:57

## 2025-07-31 RX ADMIN — CEFEPIME HYDROCHLORIDE 2 G: 2 INJECTION, SOLUTION INTRAVENOUS at 20:50

## 2025-07-31 RX ADMIN — BUPROPION HYDROCHLORIDE 150 MG: 150 TABLET, FILM COATED, EXTENDED RELEASE ORAL at 20:52

## 2025-07-31 RX ADMIN — CEFEPIME HYDROCHLORIDE 2 G: 2 INJECTION, SOLUTION INTRAVENOUS at 12:17

## 2025-07-31 RX ADMIN — ATORVASTATIN CALCIUM 80 MG: 40 TABLET, FILM COATED ORAL at 20:52

## 2025-07-31 RX ADMIN — METRONIDAZOLE 500 MG: 500 INJECTION, SOLUTION INTRAVENOUS at 21:49

## 2025-07-31 RX ADMIN — ACARBOSE 50 MG: 25 TABLET ORAL at 20:52

## 2025-07-31 RX ADMIN — VANCOMYCIN HYDROCHLORIDE 2 G: 10 INJECTION, POWDER, LYOPHILIZED, FOR SOLUTION INTRAVENOUS at 13:59

## 2025-07-31 RX ADMIN — ACETAMINOPHEN 650 MG: 325 TABLET ORAL at 14:00

## 2025-07-31 RX ADMIN — ENOXAPARIN SODIUM 40 MG: 100 INJECTION SUBCUTANEOUS at 14:41

## 2025-07-31 RX ADMIN — GABAPENTIN 100 MG: 100 CAPSULE ORAL at 20:52

## 2025-07-31 RX ADMIN — METRONIDAZOLE 500 MG: 500 INJECTION, SOLUTION INTRAVENOUS at 12:53

## 2025-07-31 SDOH — SOCIAL STABILITY: SOCIAL INSECURITY: WITHIN THE LAST YEAR, HAVE YOU BEEN AFRAID OF YOUR PARTNER OR EX-PARTNER?: NO

## 2025-07-31 SDOH — ECONOMIC STABILITY: TRANSPORTATION INSECURITY: IN THE PAST 12 MONTHS, HAS LACK OF TRANSPORTATION KEPT YOU FROM MEDICAL APPOINTMENTS OR FROM GETTING MEDICATIONS?: NO

## 2025-07-31 SDOH — SOCIAL STABILITY: SOCIAL INSECURITY: WITHIN THE LAST YEAR, HAVE YOU BEEN HUMILIATED OR EMOTIONALLY ABUSED IN OTHER WAYS BY YOUR PARTNER OR EX-PARTNER?: NO

## 2025-07-31 SDOH — ECONOMIC STABILITY: HOUSING INSECURITY: IN THE LAST 12 MONTHS, WAS THERE A TIME WHEN YOU WERE NOT ABLE TO PAY THE MORTGAGE OR RENT ON TIME?: NO

## 2025-07-31 SDOH — ECONOMIC STABILITY: INCOME INSECURITY: IN THE PAST 12 MONTHS HAS THE ELECTRIC, GAS, OIL, OR WATER COMPANY THREATENED TO SHUT OFF SERVICES IN YOUR HOME?: NO

## 2025-07-31 SDOH — ECONOMIC STABILITY: FOOD INSECURITY: WITHIN THE PAST 12 MONTHS, YOU WORRIED THAT YOUR FOOD WOULD RUN OUT BEFORE YOU GOT THE MONEY TO BUY MORE.: NEVER TRUE

## 2025-07-31 SDOH — SOCIAL STABILITY: SOCIAL INSECURITY
WITHIN THE LAST YEAR, HAVE YOU BEEN RAPED OR FORCED TO HAVE ANY KIND OF SEXUAL ACTIVITY BY YOUR PARTNER OR EX-PARTNER?: NO

## 2025-07-31 SDOH — SOCIAL STABILITY: SOCIAL INSECURITY
WITHIN THE LAST YEAR, HAVE YOU BEEN KICKED, HIT, SLAPPED, OR OTHERWISE PHYSICALLY HURT BY YOUR PARTNER OR EX-PARTNER?: NO

## 2025-07-31 SDOH — ECONOMIC STABILITY: FOOD INSECURITY: HOW HARD IS IT FOR YOU TO PAY FOR THE VERY BASICS LIKE FOOD, HOUSING, MEDICAL CARE, AND HEATING?: NOT HARD AT ALL

## 2025-07-31 SDOH — SOCIAL STABILITY: SOCIAL INSECURITY: HAVE YOU HAD THOUGHTS OF HARMING ANYONE ELSE?: NO

## 2025-07-31 SDOH — ECONOMIC STABILITY: FOOD INSECURITY: WITHIN THE PAST 12 MONTHS, THE FOOD YOU BOUGHT JUST DIDN'T LAST AND YOU DIDN'T HAVE MONEY TO GET MORE.: NEVER TRUE

## 2025-07-31 SDOH — ECONOMIC STABILITY: HOUSING INSECURITY: IN THE PAST 12 MONTHS, HOW MANY TIMES HAVE YOU MOVED WHERE YOU WERE LIVING?: 0

## 2025-07-31 SDOH — ECONOMIC STABILITY: HOUSING INSECURITY: AT ANY TIME IN THE PAST 12 MONTHS, WERE YOU HOMELESS OR LIVING IN A SHELTER (INCLUDING NOW)?: NO

## 2025-07-31 SDOH — SOCIAL STABILITY: SOCIAL INSECURITY: WERE YOU ABLE TO COMPLETE ALL THE BEHAVIORAL HEALTH SCREENINGS?: YES

## 2025-07-31 ASSESSMENT — COGNITIVE AND FUNCTIONAL STATUS - GENERAL
DAILY ACTIVITIY SCORE: 24
MOBILITY SCORE: 24
MOBILITY SCORE: 24
DAILY ACTIVITIY SCORE: 24
PATIENT BASELINE BEDBOUND: NO
DAILY ACTIVITIY SCORE: 24
MOBILITY SCORE: 24

## 2025-07-31 ASSESSMENT — ACTIVITIES OF DAILY LIVING (ADL)
PATIENT'S MEMORY ADEQUATE TO SAFELY COMPLETE DAILY ACTIVITIES?: YES
WALKS IN HOME: INDEPENDENT
FEEDING YOURSELF: INDEPENDENT
DRESSING YOURSELF: INDEPENDENT
ADEQUATE_TO_COMPLETE_ADL: YES
HEARING - LEFT EAR: FUNCTIONAL
LACK_OF_TRANSPORTATION: NO
TOILETING: INDEPENDENT
ASSISTIVE_DEVICE: EYEGLASSES
LACK_OF_TRANSPORTATION: NO
HEARING - RIGHT EAR: FUNCTIONAL
BATHING: INDEPENDENT
GROOMING: INDEPENDENT
JUDGMENT_ADEQUATE_SAFELY_COMPLETE_DAILY_ACTIVITIES: YES

## 2025-07-31 ASSESSMENT — PAIN DESCRIPTION - ORIENTATION
ORIENTATION: RIGHT
ORIENTATION: RIGHT

## 2025-07-31 ASSESSMENT — PAIN - FUNCTIONAL ASSESSMENT
PAIN_FUNCTIONAL_ASSESSMENT: 0-10

## 2025-07-31 ASSESSMENT — PATIENT HEALTH QUESTIONNAIRE - PHQ9
2. FEELING DOWN, DEPRESSED OR HOPELESS: NOT AT ALL
1. LITTLE INTEREST OR PLEASURE IN DOING THINGS: NOT AT ALL
SUM OF ALL RESPONSES TO PHQ9 QUESTIONS 1 & 2: 0

## 2025-07-31 ASSESSMENT — LIFESTYLE VARIABLES
PRESCIPTION_ABUSE_PAST_12_MONTHS: NO
AUDIT-C TOTAL SCORE: 2
HOW OFTEN DO YOU HAVE A DRINK CONTAINING ALCOHOL: 2-4 TIMES A MONTH
HAVE PEOPLE ANNOYED YOU BY CRITICIZING YOUR DRINKING: NO
AUDIT-C TOTAL SCORE: 2
HOW OFTEN DO YOU HAVE 6 OR MORE DRINKS ON ONE OCCASION: NEVER
SKIP TO QUESTIONS 9-10: 1
TOTAL SCORE: 0
SUBSTANCE_ABUSE_PAST_12_MONTHS: NO
EVER HAD A DRINK FIRST THING IN THE MORNING TO STEADY YOUR NERVES TO GET RID OF A HANGOVER: NO
HOW MANY STANDARD DRINKS CONTAINING ALCOHOL DO YOU HAVE ON A TYPICAL DAY: 1 OR 2
HAVE YOU EVER FELT YOU SHOULD CUT DOWN ON YOUR DRINKING: NO
EVER FELT BAD OR GUILTY ABOUT YOUR DRINKING: NO

## 2025-07-31 ASSESSMENT — PAIN DESCRIPTION - DESCRIPTORS
DESCRIPTORS: ACHING
DESCRIPTORS: ACHING

## 2025-07-31 ASSESSMENT — PAIN SCALES - GENERAL
PAINLEVEL_OUTOF10: 4
PAINLEVEL_OUTOF10: 6
PAINLEVEL_OUTOF10: 0 - NO PAIN
PAINLEVEL_OUTOF10: 5 - MODERATE PAIN

## 2025-07-31 ASSESSMENT — PAIN DESCRIPTION - PAIN TYPE: TYPE: ACUTE PAIN

## 2025-07-31 ASSESSMENT — PAIN DESCRIPTION - LOCATION
LOCATION: LEG
LOCATION: LEG

## 2025-07-31 NOTE — H&P
"[facility] MEDICINE HISTORY AND PHYSICAL    Subjective     Morro Seo is a 60 y.o. male with PMHx of diabetes, HTN, NAVYA and COPD who [facility] and pain. A couple of weeks PTA he kicked a vacuum . [facility] a week PTA was negative. The foot became warm and swollen. Remainder of ROS reviewed and negative except as indicated in HPI.     Objective       ED workup was benign, with CRP 0.91, WBC 9.3k, ESR 19. Blood cultures were drawn. CT foot revealed skin ulceration of the plantar distal 3rd digit with generalized  subcutaneous edema and cellulitis; osteomyelitis of the tuft 2nd distal phalanx with superimposed subtle fracture within the tuft. LE US was negative for DVT. VSS. The pt received Flagyl, vancomycin and cefepime.     Visit Vitals  /61   Pulse 71   Temp 37 °C (98.6 °F)   Resp 16   Ht 1.778 m (5' 10\")   Wt 130 kg (286 lb 13.1 oz)   SpO2 98%   BMI 41.15 kg/m²   Smoking Status Former   BSA 2.53 m²       Vitals:    07/31/25 1014   Weight: 130 kg (286 lb 13.1 oz)       Scheduled medications  Scheduled Medications[1]  Continuous medications  Continuous Medications[2]  PRN medications  PRN Medications[3]    The following labwork was reviewed:  Results for orders placed or performed during the hospital encounter of 07/31/25 (from the past 24 hours)   CBC and Auto Differential   Result Value Ref Range    WBC 9.3 4.4 - 11.3 x10*3/uL    nRBC 0.0 0.0 - 0.0 /100 WBCs    RBC 4.92 4.50 - 5.90 x10*6/uL    Hemoglobin 15.0 13.5 - 17.5 g/dL    Hematocrit 44.4 41.0 - 52.0 %    MCV 90 80 - 100 fL    MCH 30.5 26.0 - 34.0 pg    MCHC 33.8 32.0 - 36.0 g/dL    RDW 14.3 11.5 - 14.5 %    Platelets 250 150 - 450 x10*3/uL    Neutrophils % 81.2 40.0 - 80.0 %    Immature Granulocytes %, Automated 0.4 0.0 - 0.9 %    Lymphocytes % 11.7 13.0 - 44.0 %    Monocytes % 5.4 2.0 - 10.0 %    Eosinophils % 1.0 0.0 - 6.0 %    Basophils % 0.3 0.0 - 2.0 %    Neutrophils " Absolute 7.59 1.20 - 7.70 x10*3/uL    Immature Granulocytes Absolute, Automated 0.04 0.00 - 0.70 x10*3/uL    Lymphocytes Absolute 1.09 (L) 1.20 - 4.80 x10*3/uL    Monocytes Absolute 0.50 0.10 - 1.00 x10*3/uL    Eosinophils Absolute 0.09 0.00 - 0.70 x10*3/uL    Basophils Absolute 0.03 0.00 - 0.10 x10*3/uL   Comprehensive metabolic panel   Result Value Ref Range    Glucose 81 74 - 99 mg/dL    Sodium 139 136 - 145 mmol/L    Potassium 4.1 3.5 - 5.3 mmol/L    Chloride 101 98 - 107 mmol/L    Bicarbonate 31 21 - 32 mmol/L    Anion Gap 11 10 - 20 mmol/L    Urea Nitrogen 11 6 - 23 mg/dL    Creatinine 0.85 0.50 - 1.30 mg/dL    eGFR >90 >60 mL/min/1.73m*2    Calcium 10.1 8.6 - 10.3 mg/dL    Albumin 4.5 3.4 - 5.0 g/dL    Alkaline Phosphatase 83 33 - 136 U/L    Total Protein 7.6 6.4 - 8.2 g/dL    AST 20 9 - 39 U/L    Bilirubin, Total 1.0 0.0 - 1.2 mg/dL    ALT 23 10 - 52 U/L   Sedimentation rate, automated   Result Value Ref Range    Sedimentation Rate 19 0 - 20 mm/h   C-reactive protein   Result Value Ref Range    C-Reactive Protein 0.91 <1.00 mg/dL     *Note: Due to a large number of results and/or encounters for the requested time period, some results have not been displayed. A complete set of results can be found in Results Review.       The following imaging was reviewed:  Vascular US Lower Extremity Venous Duplex Right  Result Date: 7/31/2025  No sonographic evidence for deep vein thrombosis within the evaluated veins of the right lower extremity. The calf veins were not visualized due to overlying edema.   MACRO: None     Signed by: Gregory Quintanilla 7/31/2025 12:08 PM Dictation workstation:   [id number]    CT foot right wo IV contrast  Result Date: 7/31/2025  1. Skin ulceration of the plantar distal 3rd digit with generalized subcutaneous edema and cellulitis.   2. Osteomyelitis of the tuft 2nd distal phalanx with superimposed subtle fracture within the tuft.     MACRO: None   Signed by: Johana Heredia 7/31/2025 11:39 AM  Dictation workstation:   [id number]      Medical History[4]    Surgical History[5]    Social History[6]    Family History[7]    Allergies  Jardiance [empagliflozin], Penicillins, and Sulfamethoxazole-trimethoprim    Constitutional: Well developed, awake, alert, calm, oriented x4, no acute distress, cooperative   Eyes: EOMI, clear sclerae   ENMT: mucous membranes moist, no lesions seen   Head/Neck: Neck supple, no apparent injury, head atraumatic   Respiratory/Thorax: CTAB, good chest expansion, respirations even and unlabored   Cardiovascular: Regular rate and rhythm, no murmurs/rubs/gallops, normal S1 and S 2   Gastrointestinal: Abdomen nondistended, soft, nontender, +BS, no bruits   Musculoskeletal: ROM intact, no joint swelling, normal  strength   Extremities: no cyanosis, contusions or clubbing, or edema   Neurological: no focal deficit, pt alert and oriented x4   Psychological: Appropriate affect and behavior, pleasant   Skin: Warm and dry, hyperkeratotic wounds noted on plantar surface of R great toe, ball of R foot; erythema, ulceration and edema noted of R 2nd and 3rd toes       Assessment/Plan     Osteomyelitis of the right 2nd distal phalanx   Also seen is a superimposed subtle fracture within the tuft  Continue Flagyl, vancomycin and cefepime, will consult ID and podiatry   The pt has a complicated podiatric hx including hallux valgus and rigidus of both feet and pes valgus and planus of the right foot s/p R decompression osteotomy and bunionectomy w/implant placement, distal phalanx resection and MPJ capsulotomy of the R 1st digit in 2022  He underwent removal of the hardware and I+D in 2023 due to cellulitis and symptomatic internal fixation displaced/dislodged right foot   He follows with his podiatrist Dr. Pavan Powers    COPD  Continue Spiriva and therapeutic exchange for Symbicort    Diabetes  Start insulin sliding scale with hypoglycemia order set  Pt no longer taking Farxiga, glipizide and  metformin, now on Trulicity    HTN   BP is well controlled on home antihypertensive medications    NAVYA   Pt will bring his CPAP machine from home     DVT ppx: Lovenox    Discharge disposition  Discharge when medically stable      Kristin Stinson CNP  [facility] Medicine    The pt's case and plan of care was discussed with the ED provider. The ED notes were reviewed in detail, as were prior outpatient and patient notes as part of the admission chart review. The pt is admitted for osteomyelitis and the decision was made to escalate care and admit the pt under inpatient status.              [1] metroNIDAZOLE, 500 mg, intravenous, Once  vancomycin, 2 g, intravenous, Once  [2]    [3] [4]   Past Medical History:  Diagnosis Date    Anxiety     CHB (complete heart block)     COPD (chronic obstructive pulmonary disease) (Multi)     Diabetes mellitus (Multi)     HLD (hyperlipidemia)     HTN (hypertension)     NAVYA (obstructive sleep apnea)     Restless leg syndrome    [5]   Past Surgical History:  Procedure Laterality Date    ADENOIDECTOMY      CARDIAC CATHETERIZATION N/A 10/24/2024    Procedure: Left Heart Cath;  Surgeon: Cynthia Davis MD;  Location: [facility] Cardiac Cath Lab;  Service: Cardiovascular;  Laterality: N/A;    FOOT SURGERY      KNEE SURGERY      LEG SURGERY      PACEMAKER PLACEMENT      TONSILLECTOMY     [6]   Social History  Tobacco Use    Smoking status: Former     Current packs/day: 0.00     Average packs/day: 2.0 packs/day for 40.1 years (80.2 ttl pk-yrs)     Types: Cigarettes     Start date: 1984     Quit date: 10/15/2024     Years since quittin.7    Smokeless tobacco: Never   Vaping Use    Vaping status: Never Used   Substance Use Topics    Alcohol use: Not Currently     Alcohol/week: 2.0 standard drinks of alcohol     Types: 2 Cans of beer per week     Comment: rarely    Drug use: Not Currently   [7]   Family History  Problem Relation Name Age of Onset    Heart attack Mother       Hypertension Mother      Coronary artery disease Father      Heart attack Father      Hypertension Father      Diabetes Sister      Diabetes Brother      Lung cancer Maternal Grandfather      Lung cancer Paternal Grandfather      Depression Mother [place] 20 - 29    COPD Father [place] 50 - 59    Early natural death Father [place] 50 - 59    Early natural death Sister [place]edee 60 - 69

## 2025-07-31 NOTE — ED PROVIDER NOTES
HPI   Chief Complaint   Patient presents with    Leg Swelling    Leg Pain       60-year-old male with a past history of anxiety, diabetes, hyperlipidemia, GERD, anxiety presents to the emergency department today for right foot pain and swelling.  Patient states that couple weeks ago he kicked a sweeper in his son's hallway.  [facility].  The foot is now warm and swollen.  He does have some pain on the posterior aspect of the calf.  No fever or chills.  Does have a wound on the third toe.  Patient denies any fever or chills.  No chest pain, shortness of breath, dizziness headache or syncope.                          Garth Coma Scale Score: 15                  Patient History   Medical History[1]  Surgical History[2]  Family History[3]  Social History[4]    Physical Exam   ED Triage Vitals [07/31/25 1012]   Temperature Heart Rate Respirations BP   37 °C (98.6 °F) 76 18 145/78      Pulse Ox Temp src Heart Rate Source Patient Position   97 % -- -- --      BP Location FiO2 (%)     -- --       Physical Exam  Vitals and nursing note reviewed.   Constitutional:       General: He is not in acute distress.     Appearance: Normal appearance. He is not toxic-appearing.   HENT:      Right Ear: Tympanic membrane normal.      Left Ear: Tympanic membrane normal.      Mouth/Throat:      Mouth: Mucous membranes are moist.      Pharynx: Oropharynx is clear.     Eyes:      Extraocular Movements: Extraocular movements intact.      Pupils: Pupils are equal, round, and reactive to light.       Cardiovascular:      Rate and Rhythm: Normal rate and regular rhythm.      Pulses: Normal pulses.      Heart sounds: Normal heart sounds.   Pulmonary:      Effort: Pulmonary effort is normal.      Breath sounds: Normal breath sounds.   Abdominal:      General: Abdomen is flat. Bowel sounds are normal.      Palpations: Abdomen is soft.      Tenderness: There is no abdominal tenderness.      Musculoskeletal:         General: Normal range of motion.      Cervical back: Normal range of motion and neck supple.      Right lower leg: Edema present.      Left lower leg: Edema present.      Comments: Redness warmth and swelling of the right lower extremity specifically the right third toe.  There is an ulcer on the pad of the toe as well.     Skin:     General: Skin is warm and dry.      Capillary Refill: Capillary refill takes less than 2 seconds.     Neurological:      General: No focal deficit present.      Mental Status: He is alert and oriented to person, place, and time.     Psychiatric:         Mood and Affect: Mood normal.         Behavior: Behavior normal.         Judgment: Judgment normal.         Labs Reviewed   CBC WITH AUTO DIFFERENTIAL - Abnormal       Result Value    WBC 9.3      nRBC 0.0      RBC 4.92      Hemoglobin 15.0      Hematocrit 44.4      MCV 90      MCH 30.5      MCHC 33.8      RDW 14.3      Platelets 250      Neutrophils % 81.2      Immature Granulocytes %, Automated 0.4      Lymphocytes % 11.7      Monocytes % 5.4      Eosinophils % 1.0      Basophils % 0.3      Neutrophils Absolute 7.59      Immature Granulocytes Absolute, Automated 0.04      Lymphocytes Absolute 1.09 (*)     Monocytes Absolute 0.50      Eosinophils Absolute 0.09      Basophils Absolute 0.03     COMPREHENSIVE METABOLIC PANEL - Normal    Glucose 81      Sodium 139      Potassium 4.1      Chloride 101      Bicarbonate 31      Anion Gap 11      Urea Nitrogen 11      Creatinine 0.85      eGFR >90      Calcium 10.1      Albumin 4.5      Alkaline Phosphatase 83      Total Protein 7.6      AST 20      Bilirubin, Total 1.0      ALT 23     SEDIMENTATION RATE, AUTOMATED - Normal    Sedimentation Rate 19     C-REACTIVE PROTEIN - Normal    C-Reactive Protein 0.91     BLOOD CULTURE   BLOOD CULTURE     Pain Management Panel           No data to display              Vascular US Lower Extremity Venous Duplex Right   Final  Result   No sonographic evidence for deep vein thrombosis within the evaluated   veins of the right lower extremity. The calf veins were not   visualized due to overlying edema.        MACRO:   None             Signed by: Gregory Quintanilla 7/31/2025 12:08 PM   Dictation workstation:   [id number]      CT foot right wo IV contrast   Final Result   1. Skin ulceration of the plantar distal 3rd digit with generalized   subcutaneous edema and cellulitis.        2. Osteomyelitis of the tuft 2nd distal phalanx with superimposed   subtle fracture within the tuft.             MACRO:   None        Signed by: Johana Heredia 7/31/2025 11:39 AM   Dictation workstation:   [id number]          ED Course & MDM   Diagnoses as of 07/31/25 1325   Other osteomyelitis of right foot       Medical Decision Making  On initial valuation patient is well-appearing in the emergency department at this time.  He does have a ulcer to the distal aspect of the right 2nd and 3rd toe.  Third toe is swollen red and warm.  Concern for underlying infection he was brought back to a room.  Imaging and ultrasound ordered.  Does have a pacemaker therefore MRI was not performed.  Blood cultures x 2 ordered CMP, CRP, sed rate, CBC are all within normal limits ultrasound shows no evidence of DVT foot shows skin ulceration of the plantar distal third with subcutaneous edema and cellulitis and osteomyelitis of the tuft of the second distal phalanx with superimposed subtle fracture within the tuft.  Patient was given Vanco, Flagyl and cefepime in the ED        Procedure  Procedures      Differential Diagnoses include cellulitis, osteomyelitis, DVT  This is not an exhaustive list of all the diagnosis and therapeutics are considered during the patient's evaluation for an emergency medical condition.       [1]   Past Medical History:  Diagnosis Date    Anxiety     CHB (complete heart block)     COPD (chronic obstructive pulmonary disease) (Multi)     Diabetes mellitus  (Multi)     HLD (hyperlipidemia)     HTN (hypertension)     NAVYA (obstructive sleep apnea)     Restless leg syndrome    [2]   Past Surgical History:  Procedure Laterality Date    ADENOIDECTOMY      CARDIAC CATHETERIZATION N/A 10/24/2024    Procedure: Left Heart Cath;  Surgeon: Cynthia Davis MD;  Location: [facility] Cardiac Cath Lab;  Service: Cardiovascular;  Laterality: N/A;    FOOT SURGERY      KNEE SURGERY      LEG SURGERY      PACEMAKER PLACEMENT      TONSILLECTOMY     [3]   Family History  Problem Relation Name Age of Onset    Heart attack Mother      Hypertension Mother      Coronary artery disease Father      Heart attack Father      Hypertension Father      Diabetes Sister      Diabetes Brother      Lung cancer Maternal Grandfather      Lung cancer Paternal Grandfather      Depression Mother Surry 20 - 29    COPD Father Pops 50 - 59    Early natural death Father Pops 50 - 59    Early natural death Sister Deedee 60 - 69   [4]   Social History  Tobacco Use    Smoking status: Former     Current packs/day: 0.00     Average packs/day: 2.0 packs/day for 40.1 years (80.2 ttl pk-yrs)     Types: Cigarettes     Start date: 1984     Quit date: 10/15/2024     Years since quittin.7    Smokeless tobacco: Never   Vaping Use    Vaping status: Never Used   Substance Use Topics    Alcohol use: Not Currently     Alcohol/week: 2.0 standard drinks of alcohol     Types: 2 Cans of beer per week     Comment: rarely    Drug use: Not Currently        CRUZITO Estrada-CNP  [phone]

## 2025-07-31 NOTE — ED NOTES
Pt arrives to ED via POV from home    Code Status:  Full Code    HPI   Pt to ED for increased pain and swelling in right foot, specifically right 2nd and 3rd toe. Osteomyelitis in 2nd toe, cellulitis in 3rd toe. Pt is A+Ox4, ambulatory  Chief Complaint   Patient presents with    Leg Swelling    Leg Pain       /58   Pulse 68   Temp 37 °C (98.6 °F)   Resp 20   Wt 130 kg (286 lb 13.1 oz)   SpO2 97%     Garth Coma Scale Score: 15      LDA:   Peripheral IV 07/31/25 18 G Right Antecubital (Active)   Placement Date/Time: 07/31/25 1044   Size (Gauge): 18 G  Orientation: Right  Location: Antecubital  Site Prep: Chlorhexidine    Number of days: 0        BACKGROUND  Medical History[1]  Surgical History[2]  Medications Ordered Prior to Encounter[3]     ASSESSMENT  Diagnoses as of 07/31/25 1320   Other osteomyelitis of right foot       Medications Currently Running:  Continuous Medications[4]     Medications Given:  ED Medication Administration from 07/31/2025 1001 to 07/31/2025 1320         Date/Time Order Dose Route Action Action by     07/31/2025 1217 EDT cefepime (Maxipime) 2 g in dextrose 5% IV 50 mL 2 g intravenous New Bag Langenfeld, A     07/31/2025 1251 EDT cefepime (Maxipime) 2 g in dextrose 5% IV 50 mL 0 g intravenous Stopped Langenfeld, A     07/31/2025 1253 EDT metroNIDAZOLE (Flagyl) 500 mg in sodium chloride (iso)  mL 500 mg intravenous New Bag Langenfeld, A                 RESULTS    Imaging:  Vascular US Lower Extremity Venous Duplex Right   Final Result   No sonographic evidence for deep vein thrombosis within the evaluated   veins of the right lower extremity. The calf veins were not   visualized due to overlying edema.        MACRO:   None             Signed by: Gregory Quintanilla 7/31/2025 12:08 PM   Dictation workstation:   [id number]      CT foot right wo IV contrast   Final Result   1. Skin ulceration of the plantar distal 3rd digit with generalized   subcutaneous edema and cellulitis.         2. Osteomyelitis of the tuft 2nd distal phalanx with superimposed   subtle fracture within the tuft.             MACRO:   None        Signed by: Johana Heredia 7/31/2025 11:39 AM   Dictation workstation:   [id number]         }  Labs ::99  Abnormal Labs Reviewed   CBC WITH AUTO DIFFERENTIAL - Abnormal; Notable for the following components:       Result Value    Lymphocytes Absolute 1.09 (*)     All other components within normal limits                   [1]   Past Medical History:  Diagnosis Date    Anxiety     CHB (complete heart block)     COPD (chronic obstructive pulmonary disease) (Multi)     Diabetes mellitus (Multi)     HLD (hyperlipidemia)     HTN (hypertension)     NAVYA (obstructive sleep apnea)     Restless leg syndrome 1986   [2]   Past Surgical History:  Procedure Laterality Date    ADENOIDECTOMY      CARDIAC CATHETERIZATION N/A 10/24/2024    Procedure: Left Heart Cath;  Surgeon: Cynthia Davis MD;  Location: [facility] Cardiac Cath Lab;  Service: Cardiovascular;  Laterality: N/A;    FOOT SURGERY      KNEE SURGERY      LEG SURGERY      PACEMAKER PLACEMENT      TONSILLECTOMY     [3]   No current facility-administered medications on file prior to encounter.     Current Outpatient Medications on File Prior to Encounter   Medication Sig Dispense Refill    acarbose (Precose) 50 mg tablet Take 1 tablet (50 mg) by mouth 3 times daily (morning, midday, late afternoon). 270 tablet 0    albuterol 90 mcg/actuation inhaler Inhale 2 puffs every 4 hours if needed for shortness of breath. 54 g 3    ammonium lactate (Lac-Hydrin) 12 % lotion USE 1 APPLICATION TO THE AFFECTED AREA EVERY NIGHT      atorvastatin (Lipitor) 80 mg tablet Take 1 tablet (80 mg) by mouth once daily. 90 tablet 1    blood sugar diagnostic (Blood Glucose Test) strip Test once daily in the morning 100 each 3    blood-glucose meter misc Use to test blood sugars once daily 1 each 0    budesonide-formoterol (Symbicort) 160-4.5 mcg/actuation inhaler Inhale  2 puffs 2 times a day. Rinse mouth with water after use to reduce aftertaste and incidence of candidiasis. Do not swallow. 30.6 g 3    buPROPion SR (Wellbutrin SR) 150 mg 12 hr tablet Take 1 tablet (150 mg) by mouth 2 times a day. Do not crush, chew, or split. 180 tablet 0    dapagliflozin propanediol (Farxiga) 10 mg Take 1 tablet (10 mg) by mouth once daily. 30 tablet 11    dulaglutide (Trulicity) 4.5 mg/0.5 mL pen injector Inject 4.5 mg under the skin 1 (one) time per week. 6 mL 0    FreeStyle Aliza 3 Sensor device Use to check blood sugars daily. Change sensors every 14 days 6 each 3    gabapentin (Neurontin) 100 mg capsule Take 1 capsule (100 mg) by mouth 2 times a day. 180 capsule 0    glipiZIDE (Glucotrol) 5 mg tablet Take 1 tablet (5 mg) by mouth 2 times a day before meals. 180 tablet 0    ipratropium-albuteroL (Duo-Neb) 0.5-2.5 mg/3 mL nebulizer solution Take 3 mL by nebulization every 4 hours if needed for wheezing. 1080 mL 3    lancets misc Test once daily in the morning 100 each 3    losartan (Cozaar) 25 mg tablet Take 1 tablet (25 mg) by mouth once daily. 90 tablet 1    metFORMIN (Glucophage) 1,000 mg tablet Take 1 tablet (1,000 mg) by mouth 2 times daily (morning and late afternoon). 180 tablet 0    omeprazole (PriLOSEC) 40 mg DR capsule Take 1 capsule (40 mg) by mouth once daily in the morning. Take before meals. Do not crush or chew. 90 capsule 0    OneTouch Delica Plus Lancet 30 gauge misc TEST ONCE DAILY IN THE MORNING      potassium chloride CR 10 mEq ER tablet Take 1 tablet (10 mEq) by mouth once daily. 90 tablet 1    sertraline (Zoloft) 50 mg tablet Take 1 tablet (50 mg) by mouth once daily. 30 tablet 1    spironolactone (Aldactone) 25 mg tablet Take 1 tablet (25 mg) by mouth once daily. 90 tablet 1    tiotropium (Spiriva Respimat) 2.5 mcg/actuation inhaler Inhale 2 puffs once daily. 12 g 3    torsemide (Demadex) 20 mg tablet Take 1 tablet (20 mg) by mouth once daily. as directed 90 tablet 1    [4]         Elli Phoenix RN  07/31/25 1168

## 2025-07-31 NOTE — ED TRIAGE NOTES
Pt. Arrived to the ED via private car for c/o possible blood clot. [facility]. [facility] and they called him with his results and he informed them the leg was more swollen and warm so they recommenced him to get checked for a DVT. Pt. Returned home from [place] July 4th but other then that no other long car rides or plane rides

## 2025-07-31 NOTE — NURSING NOTE
End of Shift:  Patient resting comfortably in chair at this time. Has tolerated antibiotics during shift. Waiting on Dr. Hirsch to see patient still. Daughter at bedside visiting patient. Patient pleasant and cooperative.

## 2025-07-31 NOTE — TELEPHONE ENCOUNTER
Pharmacy is asking for a call back ASAP patient is telling them IEL discontinued     metFORMIN (Glucophage) 1,000 mg tablet   glipiZIDE (Glucotrol) 5 mg tablet   dapagliflozin propanediol (Farxiga) 10 mg

## 2025-07-31 NOTE — PROGRESS NOTES
Morro Seo is a 60 y.o. male admitted for Other osteomyelitis of right foot. Pharmacy reviewed the patient's [id number] medications and allergies for accuracy.    The list below reflects the PTA list prior to pharmacy medication history. A summary a changes to the PTA medication list has been listed below. Please review each medication in order reconciliation for additional clarification and justification.    Source of information:  [facility]P  [facility]  [facility]    Medications added:  ARIPIPAZOLE 5MG 1 QD    Medications modified:  TRULICITY 4.5MG /0.5ML PEN INJECT ONCE A WEEK ----->TUESDAYS    Medications to be removed:  AMMONIUM LACTATE 12% LOTION  METFORMIN 1.000 1 BID  Medications of concern:  FARXIGA 10MG 1 every day  (LAST FILL 4/3/25  GLIPIZIDE 5MG  1 BID  LASTFILL 3/14/25  METFORMIN 1000MG 1 BID  NOT TAKING SUPPOSE TO BE TAKING PER       Prior to Admission Medications   Prescriptions Last Dose Informant Patient Reported? Taking?   FreeStyle Aliza 3 Sensor device   No No   Sig: Use to check blood sugars daily. Change sensors every 14 days   OneTouch Delica Plus Lancet 30 gauge misc   Yes No   Sig: TEST ONCE DAILY IN THE MORNING   acarbose (Precose) 50 mg tablet   No No   Sig: Take 1 tablet (50 mg) by mouth 3 times daily (morning, midday, late afternoon).   albuterol 90 mcg/actuation inhaler   No No   Sig: Inhale 2 puffs every 4 hours if needed for shortness of breath.   ammonium lactate (Lac-Hydrin) 12 % lotion   Yes No   Sig: USE 1 APPLICATION TO THE AFFECTED AREA EVERY NIGHT   atorvastatin (Lipitor) 80 mg tablet   No No   Sig: Take 1 tablet (80 mg) by mouth once daily.   blood sugar diagnostic (Blood Glucose Test) strip   No No   Sig: Test once daily in the morning   blood-glucose meter misc   No No   Sig: Use to test blood sugars once daily   buPROPion SR (Wellbutrin SR) 150 mg 12 hr tablet   No No   Sig: Take 1 tablet (150 mg) by mouth 2 times a day. Do not crush, chew, or split.    budesonide-formoterol (Symbicort) 160-4.5 mcg/actuation inhaler   No No   Sig: Inhale 2 puffs 2 times a day. Rinse mouth with water after use to reduce aftertaste and incidence of candidiasis. Do not swallow.   dapagliflozin propanediol (Farxiga) 10 mg   No No   Sig: Take 1 tablet (10 mg) by mouth once daily.   dulaglutide (Trulicity) 4.5 mg/0.5 mL pen injector   No No   Sig: Inject 4.5 mg under the skin 1 (one) time per week.   gabapentin (Neurontin) 100 mg capsule   No No   Sig: Take 1 capsule (100 mg) by mouth 2 times a day.   glipiZIDE (Glucotrol) 5 mg tablet   No No   Sig: Take 1 tablet (5 mg) by mouth 2 times a day before meals.   ipratropium-albuteroL (Duo-Neb) 0.5-2.5 mg/3 mL nebulizer solution   No No   Sig: Take 3 mL by nebulization every 4 hours if needed for wheezing.   lancets misc   No No   Sig: Test once daily in the morning   losartan (Cozaar) 25 mg tablet   No No   Sig: Take 1 tablet (25 mg) by mouth once daily.   metFORMIN (Glucophage) 1,000 mg tablet Unknown  No No   Sig: Take 1 tablet (1,000 mg) by mouth 2 times daily (morning and late afternoon).   omeprazole (PriLOSEC) 40 mg DR capsule 7/31/2025  No Yes   Sig: Take 1 capsule (40 mg) by mouth once daily in the morning. Take before meals. Do not crush or chew.   potassium chloride CR 10 mEq ER tablet 7/31/2025  No Yes   Sig: Take 1 tablet (10 mEq) by mouth once daily.   sertraline (Zoloft) 50 mg tablet Unknown  No No   Sig: Take 1 tablet (50 mg) by mouth once daily.   spironolactone (Aldactone) 25 mg tablet 7/31/2025  No Yes   Sig: Take 1 tablet (25 mg) by mouth once daily.   tiotropium (Spiriva Respimat) 2.5 mcg/actuation inhaler   No No   Sig: Inhale 2 puffs once daily.   torsemide (Demadex) 20 mg tablet 7/31/2025  No Yes   Sig: Take 1 tablet (20 mg) by mouth once daily. as directed      Facility-Administered Medications: None       Senia Ma

## 2025-07-31 NOTE — PROGRESS NOTES
Vancomycin Dosing by Pharmacy- INITIAL    Morro Seo is a 60 y.o. year old male who Pharmacy has been consulted for vancomycin dosing for cellulitis, skin and soft tissue. Based on the patient's indication and renal status this patient will be dosed based on a goal AUC of 400-600.     Renal function is currently stable.    Visit Vitals  /58   Pulse 68   Temp 37 °C (98.6 °F)   Resp 20      Lab Results   Component Value Date    CREATININE 0.85 2025    CREATININE 0.89 2025    CREATININE 0.75 2025    CREATININE 0.95 2025    CREATININE 0.94 2024    CREATININE 0.72 10/01/2024      Patient weight is as follows:   Vitals:    25 1014   Weight: 130 kg (286 lb 13.1 oz)     Cultures:  No results found for the encounter in last 14 days.      No intake/output data recorded.  I/O during current shift:  No intake/output data recorded.    Temp (24hrs), Av °C (98.6 °F), Min:37 °C (98.6 °F), Max:37 °C (98.6 °F)     Assessment/Plan     Patient will be given a loading dose of 2000 mg.  Will initiate vancomycin maintenance, a loading dose of 2000 mg followed by a dose of 1500 mg 12 hours later.    This dosing regimen is predicted by [employer] to result in the following pharmacokinetic parameters:  Loading dose: 2000 mg at 14:00 2025.  Regimen: 1500 mg IV every 12 hours.  Start time: 02:00 on 2025  Exposure target: AUC24 (range) 400-600 mg/L.hr   [id number]: 476 mg/L.hr  AUC24,ss: 478 mg/L.hr  Probability of AUC24 > 400: 63 %  Ctrough,ss: 11.3 mg/L  Probability of Ctrough,ss > 20: 26 %    Follow-up level will be ordered on  at 0500 unless clinically indicated sooner.  Will continue to monitor renal function daily while on vancomycin and order serum creatinine at least every 48 hours if not already ordered.  Follow for continued vancomycin needs, clinical response, and signs/symptoms of toxicity.     Star Rojo, PharmD

## 2025-07-31 NOTE — CARE PLAN
The patient's goals for the shift include  to be able to go home tomorrow.     The clinical goals for the shift include Patient to remain afebrile, remain hemodynamically stable during shift. Patient to tolerate antibiotic therapy.    Problem: Pain - Adult  Goal: Verbalizes/displays adequate comfort level or baseline comfort level  Outcome: Progressing     Problem: Safety - Adult  Goal: Free from fall injury  Outcome: Progressing     Problem: Discharge Planning  Goal: Discharge to home or other facility with appropriate resources  Outcome: Progressing  Flowsheets (Taken 7/31/2025 1502)  Discharge to home or other facility with appropriate resources:   Arrange for needed discharge resources and transportation as appropriate   Identify barriers to discharge with patient and caregiver     Problem: Chronic Conditions and Co-morbidities  Goal: Patient's chronic conditions and co-morbidity symptoms are monitored and maintained or improved  Outcome: Progressing  Flowsheets (Taken 7/31/2025 1502)  Care Plan - Patient's Chronic Conditions and Co-Morbidity Symptoms are Monitored and Maintained or Improved:   Monitor and assess patient's chronic conditions and comorbid symptoms for stability, deterioration, or improvement   Collaborate with multidisciplinary team to address chronic and comorbid conditions and prevent exacerbation or deterioration     Problem: Nutrition  Goal: Nutrient intake appropriate for maintaining nutritional needs  Outcome: Progressing     Problem: Skin  Goal: Decreased wound size/increased tissue granulation at next dressing change  Outcome: Progressing  Goal: Participates in plan/prevention/treatment measures  Outcome: Progressing  Flowsheets (Taken 7/31/2025 1502)  Participates in plan/prevention/treatment measures:   Elevate heels   Increase activity/out of bed for meals  Goal: Prevent/manage excess moisture  Outcome: Progressing  Goal: Prevent/minimize sheer/friction injuries  Outcome:  Progressing  Goal: Promote/optimize nutrition  Outcome: Progressing  Goal: Promote skin healing  Outcome: Progressing     Problem: Infection - Adult  Goal: Absence of infection at discharge  Outcome: Progressing  Goal: Absence of infection during hospitalization  Outcome: Progressing

## 2025-08-01 ENCOUNTER — APPOINTMENT (OUTPATIENT)
Dept: VASCULAR MEDICINE | Facility: HOSPITAL | Age: 60
DRG: 617 | End: 2025-08-01
Payer: MEDICARE

## 2025-08-01 PROBLEM — M86.171: Status: ACTIVE | Noted: 2025-07-31

## 2025-08-01 PROBLEM — E11.621 DIABETIC ULCER OF TOE OF RIGHT FOOT ASSOCIATED WITH TYPE 2 DIABETES MELLITUS, WITH NECROSIS OF BONE: Status: ACTIVE | Noted: 2025-07-31

## 2025-08-01 PROBLEM — L97.514 DIABETIC ULCER OF TOE OF RIGHT FOOT ASSOCIATED WITH TYPE 2 DIABETES MELLITUS, WITH NECROSIS OF BONE: Status: ACTIVE | Noted: 2025-07-31

## 2025-08-01 LAB
ANION GAP SERPL CALC-SCNC: 8 MMOL/L (ref 10–20)
BUN SERPL-MCNC: 10 MG/DL (ref 6–23)
CALCIUM SERPL-MCNC: 9.3 MG/DL (ref 8.6–10.3)
CHLORIDE SERPL-SCNC: 104 MMOL/L (ref 98–107)
CO2 SERPL-SCNC: 30 MMOL/L (ref 21–32)
CREAT SERPL-MCNC: 0.92 MG/DL (ref 0.5–1.3)
EGFRCR SERPLBLD CKD-EPI 2021: >90 ML/MIN/1.73M*2
ERYTHROCYTE [DISTWIDTH] IN BLOOD BY AUTOMATED COUNT: 14.3 % (ref 11.5–14.5)
GLUCOSE BLD MANUAL STRIP-MCNC: 144 MG/DL (ref 74–99)
GLUCOSE BLD MANUAL STRIP-MCNC: 156 MG/DL (ref 74–99)
GLUCOSE BLD MANUAL STRIP-MCNC: 166 MG/DL (ref 74–99)
GLUCOSE BLD MANUAL STRIP-MCNC: 178 MG/DL (ref 74–99)
GLUCOSE SERPL-MCNC: 188 MG/DL (ref 74–99)
HCT VFR BLD AUTO: 41.6 % (ref 41–52)
HGB BLD-MCNC: 13.4 G/DL (ref 13.5–17.5)
MCH RBC QN AUTO: 29.5 PG (ref 26–34)
MCHC RBC AUTO-ENTMCNC: 32.2 G/DL (ref 32–36)
MCV RBC AUTO: 91 FL (ref 80–100)
NRBC BLD-RTO: 0 /100 WBCS (ref 0–0)
PLATELET # BLD AUTO: 235 X10*3/UL (ref 150–450)
POTASSIUM SERPL-SCNC: 4 MMOL/L (ref 3.5–5.3)
RBC # BLD AUTO: 4.55 X10*6/UL (ref 4.5–5.9)
SODIUM SERPL-SCNC: 138 MMOL/L (ref 136–145)
WBC # BLD AUTO: 8.6 X10*3/UL (ref 4.4–11.3)

## 2025-08-01 PROCEDURE — 2500000001 HC RX 250 WO HCPCS SELF ADMINISTERED DRUGS (ALT 637 FOR MEDICARE OP): Performed by: NURSE PRACTITIONER

## 2025-08-01 PROCEDURE — 85027 COMPLETE CBC AUTOMATED: CPT | Performed by: NURSE PRACTITIONER

## 2025-08-01 PROCEDURE — 93922 UPR/L XTREMITY ART 2 LEVELS: CPT | Performed by: INTERNAL MEDICINE

## 2025-08-01 PROCEDURE — 82947 ASSAY GLUCOSE BLOOD QUANT: CPT

## 2025-08-01 PROCEDURE — 2500000004 HC RX 250 GENERAL PHARMACY W/ HCPCS (ALT 636 FOR OP/ED): Performed by: NURSE PRACTITIONER

## 2025-08-01 PROCEDURE — 99233 SBSQ HOSP IP/OBS HIGH 50: CPT | Performed by: PHYSICIAN ASSISTANT

## 2025-08-01 PROCEDURE — 93922 UPR/L XTREMITY ART 2 LEVELS: CPT

## 2025-08-01 PROCEDURE — 87070 CULTURE OTHR SPECIMN AEROBIC: CPT | Mod: PORLAB | Performed by: PODIATRIST

## 2025-08-01 PROCEDURE — 1210000001 HC SEMI-PRIVATE ROOM DAILY

## 2025-08-01 PROCEDURE — 2500000002 HC RX 250 W HCPCS SELF ADMINISTERED DRUGS (ALT 637 FOR MEDICARE OP, ALT 636 FOR OP/ED): Performed by: NURSE PRACTITIONER

## 2025-08-01 PROCEDURE — 36415 COLL VENOUS BLD VENIPUNCTURE: CPT | Performed by: NURSE PRACTITIONER

## 2025-08-01 PROCEDURE — 80048 BASIC METABOLIC PNL TOTAL CA: CPT | Performed by: NURSE PRACTITIONER

## 2025-08-01 RX ADMIN — BUPROPION HYDROCHLORIDE 150 MG: 150 TABLET, FILM COATED, EXTENDED RELEASE ORAL at 08:32

## 2025-08-01 RX ADMIN — VANCOMYCIN HYDROCHLORIDE 1.5 G: 1.5 INJECTION, SOLUTION INTRAVITREAL at 08:31

## 2025-08-01 RX ADMIN — BUPROPION HYDROCHLORIDE 150 MG: 150 TABLET, FILM COATED, EXTENDED RELEASE ORAL at 20:05

## 2025-08-01 RX ADMIN — PANTOPRAZOLE SODIUM 40 MG: 40 TABLET, DELAYED RELEASE ORAL at 08:32

## 2025-08-01 RX ADMIN — INSULIN LISPRO 1 UNITS: 100 INJECTION, SOLUTION INTRAVENOUS; SUBCUTANEOUS at 08:35

## 2025-08-01 RX ADMIN — TIOTROPIUM BROMIDE INHALATION SPRAY 2 PUFF: 3.12 SPRAY, METERED RESPIRATORY (INHALATION) at 15:00

## 2025-08-01 RX ADMIN — SERTRALINE HYDROCHLORIDE 50 MG: 50 TABLET ORAL at 08:31

## 2025-08-01 RX ADMIN — ACARBOSE 50 MG: 25 TABLET ORAL at 20:05

## 2025-08-01 RX ADMIN — TORSEMIDE 20 MG: 20 TABLET ORAL at 08:31

## 2025-08-01 RX ADMIN — FLUTICASONE FUROATE AND VILANTEROL TRIFENATATE 1 PUFF: 200; 25 POWDER RESPIRATORY (INHALATION) at 15:00

## 2025-08-01 RX ADMIN — METRONIDAZOLE 500 MG: 500 INJECTION, SOLUTION INTRAVENOUS at 05:57

## 2025-08-01 RX ADMIN — SPIRONOLACTONE 25 MG: 25 TABLET ORAL at 08:31

## 2025-08-01 RX ADMIN — GABAPENTIN 100 MG: 100 CAPSULE ORAL at 20:05

## 2025-08-01 RX ADMIN — ACETAMINOPHEN 650 MG: 325 TABLET ORAL at 20:05

## 2025-08-01 RX ADMIN — LOSARTAN POTASSIUM 25 MG: 25 TABLET, FILM COATED ORAL at 08:31

## 2025-08-01 RX ADMIN — ENOXAPARIN SODIUM 40 MG: 100 INJECTION SUBCUTANEOUS at 14:59

## 2025-08-01 RX ADMIN — ACETAMINOPHEN 650 MG: 325 TABLET ORAL at 08:32

## 2025-08-01 RX ADMIN — ARIPIPRAZOLE 5 MG: 5 TABLET ORAL at 08:32

## 2025-08-01 RX ADMIN — ACARBOSE 50 MG: 25 TABLET ORAL at 08:32

## 2025-08-01 RX ADMIN — ATORVASTATIN CALCIUM 80 MG: 40 TABLET, FILM COATED ORAL at 20:05

## 2025-08-01 RX ADMIN — DOCUSATE SODIUM 50MG AND SENNOSIDES 8.6MG 1 TABLET: 8.6; 5 TABLET, FILM COATED ORAL at 20:05

## 2025-08-01 RX ADMIN — GABAPENTIN 100 MG: 100 CAPSULE ORAL at 08:31

## 2025-08-01 RX ADMIN — CEFEPIME HYDROCHLORIDE 2 G: 2 INJECTION, SOLUTION INTRAVENOUS at 04:58

## 2025-08-01 RX ADMIN — POTASSIUM CHLORIDE 10 MEQ: 750 TABLET, EXTENDED RELEASE ORAL at 08:32

## 2025-08-01 ASSESSMENT — COGNITIVE AND FUNCTIONAL STATUS - GENERAL
DAILY ACTIVITIY SCORE: 24
MOBILITY SCORE: 24

## 2025-08-01 ASSESSMENT — ENCOUNTER SYMPTOMS
PALPITATIONS: 0
HALLUCINATIONS: 0
JOINT SWELLING: 0
VOMITING: 0
DIARRHEA: 0
LIGHT-HEADEDNESS: 0
TROUBLE SWALLOWING: 0
CONSTIPATION: 0
CHILLS: 0
FLANK PAIN: 0
CHEST TIGHTNESS: 0
HEADACHES: 0
BLOOD IN STOOL: 0
COUGH: 0
WOUND: 1
DIZZINESS: 0
FATIGUE: 0
EYE PAIN: 0
FACIAL SWELLING: 0
SORE THROAT: 0
WEAKNESS: 0
ABDOMINAL PAIN: 0
DYSURIA: 0
WHEEZING: 0
NUMBNESS: 0
APPETITE CHANGE: 0
BACK PAIN: 0
DIAPHORESIS: 0
SHORTNESS OF BREATH: 0
HEMATURIA: 0
FEVER: 0
BRUISES/BLEEDS EASILY: 0
FREQUENCY: 0
NAUSEA: 0

## 2025-08-01 ASSESSMENT — PAIN SCALES - GENERAL
PAINLEVEL_OUTOF10: 2
PAINLEVEL_OUTOF10: 3
PAINLEVEL_OUTOF10: 3
PAINLEVEL_OUTOF10: 0 - NO PAIN
PAINLEVEL_OUTOF10: 2

## 2025-08-01 ASSESSMENT — PAIN - FUNCTIONAL ASSESSMENT
PAIN_FUNCTIONAL_ASSESSMENT: 0-10

## 2025-08-01 ASSESSMENT — PAIN DESCRIPTION - DESCRIPTORS: DESCRIPTORS: ACHING

## 2025-08-01 NOTE — CARE PLAN
The patient's goals for the shift include  to see the podiatrist.     The clinical goals for the shift include Patient will remain afebrile during shift. Patient will tolerate antibiotic therapy during shift. Patient will verbalize pain <4/10 during shift.    Problem: Pain - Adult  Goal: Verbalizes/displays adequate comfort level or baseline comfort level  Outcome: Progressing     Problem: Safety - Adult  Goal: Free from fall injury  Outcome: Progressing     Problem: Discharge Planning  Goal: Discharge to home or other facility with appropriate resources  Outcome: Progressing  Flowsheets (Taken 8/1/2025 1043)  Discharge to home or other facility with appropriate resources:   Identify barriers to discharge with patient and caregiver   Arrange for needed discharge resources and transportation as appropriate     Problem: Chronic Conditions and Co-morbidities  Goal: Patient's chronic conditions and co-morbidity symptoms are monitored and maintained or improved  Outcome: Progressing  Flowsheets (Taken 8/1/2025 1043)  Care Plan - Patient's Chronic Conditions and Co-Morbidity Symptoms are Monitored and Maintained or Improved:   Monitor and assess patient's chronic conditions and comorbid symptoms for stability, deterioration, or improvement   Collaborate with multidisciplinary team to address chronic and comorbid conditions and prevent exacerbation or deterioration     Problem: Nutrition  Goal: Nutrient intake appropriate for maintaining nutritional needs  Outcome: Progressing     Problem: Skin  Goal: Decreased wound size/increased tissue granulation at next dressing change  Outcome: Progressing  Goal: Participates in plan/prevention/treatment measures  Outcome: Progressing  Flowsheets (Taken 8/1/2025 1043)  Participates in plan/prevention/treatment measures:   Elevate heels   Increase activity/out of bed for meals  Goal: Prevent/manage excess moisture  Outcome: Progressing  Goal: Prevent/minimize sheer/friction  injuries  Outcome: Progressing  Goal: Promote/optimize nutrition  Outcome: Progressing  Goal: Promote skin healing  Outcome: Progressing     Problem: Infection - Adult  Goal: Absence of infection at discharge  Outcome: Progressing  Goal: Absence of infection during hospitalization  Outcome: Progressing     Problem: Diabetes  Goal: Achieve decreasing blood glucose levels by end of shift  Outcome: Progressing  Goal: Increase stability of blood glucose readings by end of shift  Outcome: Progressing  Goal: Decrease in ketones present in urine by end of shift  Outcome: Progressing  Goal: Maintain electrolyte levels within acceptable range throughout shift  Outcome: Progressing  Goal: Maintain glucose levels >70mg/dl to <250mg/dl throughout shift  Outcome: Progressing  Goal: No changes in neurological exam by end of shift  Outcome: Progressing  Goal: Learn about and adhere to nutrition recommendations by end of shift  Outcome: Progressing  Goal: Vital signs within normal range for age by end of shift  Outcome: Progressing  Goal: Increase self care and/or family involovement by end of shift  Outcome: Progressing  Goal: Receive DSME education by end of shift  Outcome: Progressing     Problem: Pain  Goal: Takes deep breaths with improved pain control throughout the shift  Outcome: Progressing  Goal: Turns in bed with improved pain control throughout the shift  Outcome: Progressing  Goal: Walks with improved pain control throughout the shift  Outcome: Progressing  Goal: Performs ADL's with improved pain control throughout shift  Outcome: Progressing  Goal: Participates in PT with improved pain control throughout the shift  Outcome: Progressing  Goal: Free from opioid side effects throughout the shift  Outcome: Progressing  Goal: Free from acute confusion related to pain meds throughout the shift  Outcome: Progressing

## 2025-08-01 NOTE — CARE PLAN
The patient's goals for the shift include      The clinical goals for the shift include Pt will remain hemodynamically stable overnight.      Problem: Pain - Adult  Goal: Verbalizes/displays adequate comfort level or baseline comfort level  Outcome: Progressing     Problem: Safety - Adult  Goal: Free from fall injury  Outcome: Progressing     Problem: Chronic Conditions and Co-morbidities  Goal: Patient's chronic conditions and co-morbidity symptoms are monitored and maintained or improved  Outcome: Progressing     Problem: Skin  Goal: Decreased wound size/increased tissue granulation at next dressing change  Outcome: Progressing  Goal: Participates in plan/prevention/treatment measures  Outcome: Progressing  Goal: Prevent/manage excess moisture  Outcome: Progressing  Goal: Prevent/minimize sheer/friction injuries  Outcome: Progressing  Goal: Promote/optimize nutrition  Outcome: Progressing  Goal: Promote skin healing  Outcome: Progressing     Problem: Infection - Adult  Goal: Absence of infection at discharge  Outcome: Progressing  Goal: Absence of infection during hospitalization  Outcome: Progressing     Problem: Diabetes  Goal: Achieve decreasing blood glucose levels by end of shift  Outcome: Progressing  Goal: Increase stability of blood glucose readings by end of shift  Outcome: Progressing  Goal: Decrease in ketones present in urine by end of shift  Outcome: Progressing  Goal: Maintain electrolyte levels within acceptable range throughout shift  Outcome: Progressing  Goal: Maintain glucose levels >70mg/dl to <250mg/dl throughout shift  Outcome: Progressing  Goal: No changes in neurological exam by end of shift  Outcome: Progressing  Goal: Learn about and adhere to nutrition recommendations by end of shift  Outcome: Progressing  Goal: Vital signs within normal range for age by end of shift  Outcome: Progressing  Goal: Increase self care and/or family involovement by end of shift  Outcome: Progressing  Goal:  Receive DSME education by end of shift  Outcome: Progressing     Problem: Pain  Goal: Takes deep breaths with improved pain control throughout the shift  Outcome: Progressing  Goal: Turns in bed with improved pain control throughout the shift  Outcome: Progressing  Goal: Walks with improved pain control throughout the shift  Outcome: Progressing  Goal: Performs ADL's with improved pain control throughout shift  Outcome: Progressing  Goal: Free from acute confusion related to pain meds throughout the shift  Outcome: Progressing

## 2025-08-01 NOTE — PROGRESS NOTES
Vancomycin Dosing by Pharmacy- Cessation of Therapy    Consult to pharmacy for vancomycin dosing has been discontinued by the prescriber, pharmacy will sign off at this time.    Please call pharmacy if there are further questions or re-enter a consult if vancomycin is resumed.     Carlos Overton [facility]

## 2025-08-01 NOTE — NURSING NOTE
End of Shift:   Patient resting comfortably in bed at this time. Patient has been ambulating independently in hallways during shift. Patient disappointed he will be here for a few more days, however, he is cooperative and pleasant. Patient will be NPO at midnight for amputation tomorrow with Dr. Hirsch.

## 2025-08-01 NOTE — CONSULTS
"Nutrition Initial Assessment:   Nutrition Assessment    Reason for Assessment: Admission nursing screening, Provider consult order    Medical history per chart: \"diabetes, HTN, NAVYA and COPD who [facility] and [facility]\" (H&P).    Nutrition History:  Energy Intake: Good > 75 %  Food and Nutrient History: Pt  and dtr Ricarda report pt has had some weight loss recently d/t life stressors; pt reports \"lack of appetite\" for awhile PTA. Pt reports he has been eating more than usual while he's been here. Pt reports R foot wound ~2 months.       Average meal Intake during admission: %   Dietary Orders (From admission, onward)       Start     Ordered    08/01/25 1346  Oral nutritional supplements  Until discontinued        Comments: Fruit Punch   Question Answer Comment   Deliver with Breakfast    Deliver with Dinner    Select supplement: Tyrell        08/01/25 1346    07/31/25 1327  May Participate in Room Service  ( ROOM SERVICE MAY PARTICIPATE)  Once        Question:  .  Answer:  Yes    07/31/25 1326    07/31/25 1316  Adult diet Consistent Carb; CCD 60 gm/meal  Diet effective now        Question Answer Comment   Diet type Consistent Carb    Carb diet selection: CCD 60 gm/meal        07/31/25 1318                    Anthropometrics:  Height: 177.8 cm (5' 10\")   Weight: 130 kg (286 lb 13.1 oz)   BMI (Calculated): 41.15  IBW/kg (Dietitian Calculated): 75.5 kg  Percent of IBW: 172.2 %  Adjusted Body Weight (kg): 89.1 kg  Weight History:   Wt Readings from Last 10 Encounters:   07/31/25 130 kg (286 lb 13.1 oz)   07/03/25 128 kg (282 lb 12.8 oz)   06/19/25 135 kg (297 lb)   05/20/25 136 kg (300 lb)   04/21/25 138 kg (305 lb 3.6 oz)   03/12/25 137 kg (302 lb)   02/18/25 136 kg (300 lb)   11/13/24 133 kg (294 lb)   11/06/24 135 kg (297 lb 6.4 oz)   10/24/24 132 kg (291 lb 0.1 oz)       Weight Change %:  Weight History / % Weight Change: Pt reports wt loss; he reports  lbs. " for the past 1 1/2 years. Per wt hx, pt weighed 300 lbs 5/20/25, CBW: 286 lbs., which is ~14 lb wt loss x <3 months (4.7%).  Significant Weight Loss: No    Nutrition Focused Physical Exam Findings:  Subcutaneous Fat Loss:   Orbital Fat Pads: Well nourished (slightly bulging fat pads)  Buccal Fat Pads: Well nourished (full, rounded cheeks)  Triceps: Well nourished (ample fat tissue)  Muscle Wasting:  Temporalis: Well nourished (well-defined muscle)  Pectoralis (Clavicular Region): Well nourished (clavicle not visible)  Deltoid/Trapezius: Well nourished (rounded appearance at arm, shoulder, neck)  Interosseous: Well nourished (muscle bulges)  Edema:  Edema Location: +1 RLE edema noted per Nursing Flowsheet (likely related to R foot wound).  Physical Findings:  Skin: Positive (R foot osteomyelitis)    Nutrition Significant Labs:  CBC Trend:   Results from last 7 days   Lab Units 08/01/25  0504 07/31/25  1044   WBC AUTO x10*3/uL 8.6 9.3   RBC AUTO x10*6/uL 4.55 4.92   HEMOGLOBIN g/dL 13.4* 15.0   HEMATOCRIT % 41.6 44.4   MCV fL 91 90   PLATELETS AUTO x10*3/uL 235 250    , BMP Trend:   Results from last 7 days   Lab Units 08/01/25  0504 07/31/25  1044   GLUCOSE mg/dL 188* 81   CALCIUM mg/dL 9.3 10.1   SODIUM mmol/L 138 139   POTASSIUM mmol/L 4.0 4.1   CO2 mmol/L 30 31   CHLORIDE mmol/L 104 101   BUN mg/dL 10 11   CREATININE mg/dL 0.92 0.85      Results from last 7 days   Lab Units 08/01/25  0504 07/31/25  1044   GLUCOSE mg/dL 188* 81   SODIUM mmol/L 138 139   POTASSIUM mmol/L 4.0 4.1   CHLORIDE mmol/L 104 101   CO2 mmol/L 30 31   BUN mg/dL 10 11   CREATININE mg/dL 0.92 0.85   EGFR mL/min/1.73m*2 >90 >90   CALCIUM mg/dL 9.3 10.1     Lab Results   Component Value Date    HGBA1C 7.3 (H) 06/17/2025    HGBA1C 10.5 (H) 03/05/2025     Results from last 7 days   Lab Units 08/01/25  1119 08/01/25 0625 07/31/25 2049 07/31/25  1624   POCT GLUCOSE mg/dL 178* 166* 160* 184*       Nutrition Specific Medications:  Meds  reviewed/noted.   Scheduled Medications[1]   Continuous Medications[2]     I/O:   Last BM Date: 07/31/25; Stool Appearance: Formed (per pt) (07/31/25 2100)    Estimated Needs:   Total Energy Estimated Needs in 24 hours (kCal): 2200 kCal  Method for Estimating Needs: 25 kcal/kg Adjusted BW  Total Protein Estimated Needs in 24 Hours (g): 107 g  Method for Estimating 24 Hour Protein Needs: 1.2 g/kg Adjusted BW     Method for Estimating 24 Hour Fluid Needs: 1 ml/kcal        Nutrition Diagnosis   Malnutrition Diagnosis  Patient has Malnutrition Diagnosis: No    Nutrition Diagnosis  Patient has Nutrition Diagnosis: Yes  Diagnosis Status (1): New  Nutrition Diagnosis 1: Increased nutrient needs  Related to (1): for protein to promote wound healing  As Evidenced by (1): R foot wound/osteomyelitis; pt reports wound present for ~2 months.       Nutrition Interventions/Recommendations   Nutrition prescription for oral nutrition    Nutrition Recommendations:  Individualized Nutrition Prescription Provided for : Continue Consistent Carb Diet per MD order. Initiate Fruit Punch Tyrell BID with meals to provide arginine and glutamine to aid in wound healing.    Nutrition Interventions/Goals:   Interventions: Meals and snacks, Medical food supplement  Meals and Snacks: Carbohydrate-modified diet, General healthful diet  Goal: Maintain intake at 75% or greater for most meals.  Medical Food Supplement: Commercial beverage medical food supplement therapy  Goal: Consume 75% or greater for Tyrell supplement BID.      Education Documentation  Nutrition Related Education, taught by Maritza Lindquist RDN, LD at 8/1/2025  2:13 PM.  Learner: Family, Patient  Readiness: Acceptance  Method: Explanation  Response: Verbalizes Understanding  Comment: Explained rationale for Tyrell to aid in wound healing; pt agreeable to try. Provided handouts and coupons for home.              Nutrition Monitoring and Evaluation   Food/Nutrient Related History  Monitoring  Monitoring and Evaluation Plan: Estimated Energy Intake, Intake / amount of food  Estimated Energy Intake: Energy intake greater or equal to 75% of estimated energy needs  Intake / Amount of food: Consumes at least 75% or more of meals/snacks/supplements    Anthropometric Measurements  Monitoring and Evaluation Plan: Body weight  Body Weight: Body weight - Maintain stable weight    Biochemical Data, Medical Tests and Procedures  Monitoring and Evaluation Plan: Electrolyte/renal panel, Glucose/endocrine profile  Electrolyte and Renal Panel: Electrolytes within normal limits  Glucose/Endocrine Profile: Glucose within normal limits ( mg/dL)    Physical Exam Findings  Monitoring and Evaluation Plan: Skin  Skin Finding: Impaired wound healing - Improved wound healing    Goal Status: New goal(s) identified    Time Spent (min): 45 minutes              [1] acarbose, 50 mg, oral, TID  ARIPiprazole, 5 mg, oral, Daily  atorvastatin, 80 mg, oral, Nightly  buPROPion SR, 150 mg, oral, BID  enoxaparin, 40 mg, subcutaneous, Daily  fluticasone furoate-vilanteroL, 1 puff, inhalation, Daily  gabapentin, 100 mg, oral, BID  insulin lispro, 0-5 Units, subcutaneous, TID AC  losartan, 25 mg, oral, Daily  pantoprazole, 40 mg, oral, Daily  potassium chloride CR, 10 mEq, oral, Daily  sennosides-docusate sodium, 1 tablet, oral, Nightly  sertraline, 50 mg, oral, Daily  spironolactone, 25 mg, oral, Daily  tiotropium, 2 puff, inhalation, Daily  torsemide, 20 mg, oral, Daily     [2]

## 2025-08-01 NOTE — PROGRESS NOTES
Morro Seo is a 60 y.o. male on day 1 of admission presenting with Other osteomyelitis of right foot.      Subjective   Morro Seo is a 60 y.o. male with PMHx of diabetes, HTN, NAVYA and COPD who [facility] with right foot swelling and pain. A couple of weeks PTA he kicked a vacuum . [facility] week P[facility] was negative. The foot became warm and swollen. ED workup was benign, with CRP 0.91, WBC 9.3k, ESR 19. Blood cultures were drawn. CT foot revealed skin ulceration of the plantar distal 3rd digit with generalized  subcutaneous edema and cellulitis; osteomyelitis of the tuft 2nd distal phalanx with superimposed subtle fracture within the tuft. LE US was negative for DVT.     8/1/2025: No acute events overnight. Vitals stable, afebrile. CBC/BMP reviewed, largely unremarkable, no leukocytosis. Antibiotics stopped by ID for better chance of getting culture. ANNA with adequate blood flow. Waiting on podiatry recommendations, ay need 3rd toe amputation.        Patient evaluated in the presence of RN    Review of Systems   Constitutional:  Negative for appetite change, chills, diaphoresis, fatigue and fever.   HENT:  Negative for congestion, ear pain, facial swelling, hearing loss, nosebleeds, sore throat, tinnitus and trouble swallowing.    Eyes:  Negative for pain.   Respiratory:  Negative for cough, chest tightness, shortness of breath and wheezing.    Cardiovascular:  Negative for chest pain, palpitations and leg swelling.   Gastrointestinal:  Negative for abdominal pain, blood in stool, constipation, diarrhea, nausea and vomiting.   Genitourinary:  Negative for dysuria, flank pain, frequency, hematuria and urgency.   Musculoskeletal:  Negative for back pain and joint swelling.   Skin:  Positive for wound. Negative for rash.   Neurological:  Negative for dizziness, syncope, weakness, light-headedness, numbness and headaches.   Hematological:  Does not bruise/bleed  easily.   Psychiatric/Behavioral:  Negative for behavioral problems, hallucinations and suicidal ideas.           Objective     Last Recorded Vitals  /70 (BP Location: Left arm, Patient Position: Lying)   Pulse 75   Temp 36.1 °C (96.9 °F) (Temporal)   Resp 17   Wt 130 kg (286 lb 13.1 oz)   SpO2 94%     Image Results  Imaging  Vascular US Lower Extremity Venous Duplex Right  Result Date: 7/31/2025  No sonographic evidence for deep vein thrombosis within the evaluated veins of the right lower extremity. The calf veins were not visualized due to overlying edema.   MACRO: None     Signed by: Gregory Quintanilla 7/31/2025 12:08 PM Dictation workstation:   [id number]    CT foot right wo IV contrast  Result Date: 7/31/2025  1. Skin ulceration of the plantar distal 3rd digit with generalized subcutaneous edema and cellulitis.   2. Osteomyelitis of the tuft 2nd distal phalanx with superimposed subtle fracture within the tuft.     MACRO: None   Signed by: Johana Heredia 7/31/2025 11:39 AM Dictation workstation:   [id number]      Cardiology, Vascular, and Other Imaging  No other imaging results found for the past 7 days       Lab Results  Results for orders placed or performed during the hospital encounter of 07/31/25 (from the past 24 hours)   CBC and Auto Differential   Result Value Ref Range    WBC 9.3 4.4 - 11.3 x10*3/uL    nRBC 0.0 0.0 - 0.0 /100 WBCs    RBC 4.92 4.50 - 5.90 x10*6/uL    Hemoglobin 15.0 13.5 - 17.5 g/dL    Hematocrit 44.4 41.0 - 52.0 %    MCV 90 80 - 100 fL    MCH 30.5 26.0 - 34.0 pg    MCHC 33.8 32.0 - 36.0 g/dL    RDW 14.3 11.5 - 14.5 %    Platelets 250 150 - 450 x10*3/uL    Neutrophils % 81.2 40.0 - 80.0 %    Immature Granulocytes %, Automated 0.4 0.0 - 0.9 %    Lymphocytes % 11.7 13.0 - 44.0 %    Monocytes % 5.4 2.0 - 10.0 %    Eosinophils % 1.0 0.0 - 6.0 %    Basophils % 0.3 0.0 - 2.0 %    Neutrophils Absolute 7.59 1.20 - 7.70 x10*3/uL    Immature Granulocytes Absolute, Automated 0.04 0.00 -  0.70 x10*3/uL    Lymphocytes Absolute 1.09 (L) 1.20 - 4.80 x10*3/uL    Monocytes Absolute 0.50 0.10 - 1.00 x10*3/uL    Eosinophils Absolute 0.09 0.00 - 0.70 x10*3/uL    Basophils Absolute 0.03 0.00 - 0.10 x10*3/uL   Comprehensive metabolic panel   Result Value Ref Range    Glucose 81 74 - 99 mg/dL    Sodium 139 136 - 145 mmol/L    Potassium 4.1 3.5 - 5.3 mmol/L    Chloride 101 98 - 107 mmol/L    Bicarbonate 31 21 - 32 mmol/L    Anion Gap 11 10 - 20 mmol/L    Urea Nitrogen 11 6 - 23 mg/dL    Creatinine 0.85 0.50 - 1.30 mg/dL    eGFR >90 >60 mL/min/1.73m*2    Calcium 10.1 8.6 - 10.3 mg/dL    Albumin 4.5 3.4 - 5.0 g/dL    Alkaline Phosphatase 83 33 - 136 U/L    Total Protein 7.6 6.4 - 8.2 g/dL    AST 20 9 - 39 U/L    Bilirubin, Total 1.0 0.0 - 1.2 mg/dL    ALT 23 10 - 52 U/L   Sedimentation rate, automated   Result Value Ref Range    Sedimentation Rate 19 0 - 20 mm/h   C-reactive protein   Result Value Ref Range    C-Reactive Protein 0.91 <1.00 mg/dL   Blood Culture    Specimen: Peripheral Venipuncture; Blood culture   Result Value Ref Range    Blood Culture Loaded on Instrument - Culture in progress    Blood Culture    Specimen: Peripheral Venipuncture; Blood culture   Result Value Ref Range    Blood Culture Loaded on Instrument - Culture in progress    POCT GLUCOSE   Result Value Ref Range    POCT Glucose 184 (H) 74 - 99 mg/dL   POCT GLUCOSE   Result Value Ref Range    POCT Glucose 160 (H) 74 - 99 mg/dL   Basic Metabolic Panel   Result Value Ref Range    Glucose 188 (H) 74 - 99 mg/dL    Sodium 138 136 - 145 mmol/L    Potassium 4.0 3.5 - 5.3 mmol/L    Chloride 104 98 - 107 mmol/L    Bicarbonate 30 21 - 32 mmol/L    Anion Gap 8 (L) 10 - 20 mmol/L    Urea Nitrogen 10 6 - 23 mg/dL    Creatinine 0.92 0.50 - 1.30 mg/dL    eGFR >90 >60 mL/min/1.73m*2    Calcium 9.3 8.6 - 10.3 mg/dL   CBC   Result Value Ref Range    WBC 8.6 4.4 - 11.3 x10*3/uL    nRBC 0.0 0.0 - 0.0 /100 WBCs    RBC 4.55 4.50 - 5.90 x10*6/uL    Hemoglobin  13.4 (L) 13.5 - 17.5 g/dL    Hematocrit 41.6 41.0 - 52.0 %    MCV 91 80 - 100 fL    MCH 29.5 26.0 - 34.0 pg    MCHC 32.2 32.0 - 36.0 g/dL    RDW 14.3 11.5 - 14.5 %    Platelets 235 150 - 450 x10*3/uL   POCT GLUCOSE   Result Value Ref Range    POCT Glucose 166 (H) 74 - 99 mg/dL        Medications  Scheduled medications:  Scheduled Medications[1]  Continuous medications:  Continuous Medications[2]  PRN medications:  PRN Medications[3]     Physical Exam  Constitutional:       General: He is not in acute distress.     Appearance: Normal appearance. He is obese.   HENT:      Head: Normocephalic and atraumatic.      Right Ear: External ear normal.      Left Ear: External ear normal.      Nose: Nose normal.      Mouth/Throat:      Mouth: Mucous membranes are moist.      Pharynx: Oropharynx is clear.     Eyes:      Extraocular Movements: Extraocular movements intact.      Conjunctiva/sclera: Conjunctivae normal.      Pupils: Pupils are equal, round, and reactive to light.       Cardiovascular:      Rate and Rhythm: Normal rate and regular rhythm.      Pulses: Normal pulses.      Heart sounds: Normal heart sounds.   Pulmonary:      Effort: Pulmonary effort is normal. No respiratory distress.      Breath sounds: Normal breath sounds. No wheezing, rhonchi or rales.   Abdominal:      General: Bowel sounds are normal.      Palpations: Abdomen is soft.      Tenderness: There is no abdominal tenderness. There is no right CVA tenderness, left CVA tenderness, guarding or rebound.     Musculoskeletal:         General: No swelling. Normal range of motion.      Cervical back: Normal range of motion and neck supple.     Skin:     General: Skin is warm and dry.      Capillary Refill: Capillary refill takes less than 2 seconds.      Findings: Lesion present. No rash.      Comments: Wounds on R 2nd and 3rd toes. See below     Neurological:      General: No focal deficit present.      Mental Status: He is alert and oriented to person,  place, and time. Mental status is at baseline.     Psychiatric:         Mood and Affect: Mood normal.         Behavior: Behavior normal.       Wound 07/31/25 Other (Comment) Toe D3, third Anterior;Right (Active)   Wound Image     07/31/25 1348   Site Assessment Eschar;Necrotic 07/31/25 1348   Shape circular 07/31/25 1348   Wound Length (cm) 1.5 cm 07/31/25 1348   Wound Width (cm) 1.5 cm 07/31/25 1348   Wound Surface Area (cm^2) 1.77 cm^2 07/31/25 1348   Wound Depth (cm) 0.1 cm 07/31/25 1348   Wound Volume (cm^3) 0.118 cm^3 07/31/25 1348   State of Healing Eschar 07/31/25 1348   Drainage Description None 07/31/25 2055   Drainage Amount None 07/31/25 2055   Dressing Open to air 07/31/25 2055   Number of days: 0                     Assessment/Plan      Osteomyelitis of the right 2nd distal phalanx   Also seen is a superimposed subtle fracture within the tuft  Flagyl, vancomycin and cefepime held for better cx results  Consult ID  Consult podiatry  The pt has a complicated podiatric hx including hallux valgus and rigidus of both feet and pes valgus and planus of the right foot s/p R decompression osteotomy and bunionectomy w/implant placement, distal phalanx resection and MPJ capsulotomy of the R 1st digit in 2022  He underwent removal of the hardware and I+D in 2023 due to cellulitis and symptomatic internal fixation displaced/dislodged right foot   Suspect may need 3rd toe amputation- defer to podiatry      COPD  In acute exacerbation  Continue Spiriva and therapeutic exchange for Symbicort     Diabetes  Start insulin sliding scale with hypoglycemia order set  Pt no longer taking Farxiga, glipizide and metformin-he is now on Trulicity     HTN   BP is well controlled on home antihypertensive medications-losartan 25 mg daily, Aldactone 25 mg daily, torsemide 20 mg daily     NAVYA   Pt will bring his CPAP machine from home, should continue while here    Code Status: Full Code                 DVT ppx: SCDs, Lovenox       Please see orders for more complete plan    Valeria Navarro PA-C         [1] acarbose, 50 mg, oral, TID  ARIPiprazole, 5 mg, oral, Daily  atorvastatin, 80 mg, oral, Nightly  buPROPion SR, 150 mg, oral, BID  cefepime, 2 g, intravenous, q8h  enoxaparin, 40 mg, subcutaneous, Daily  fluticasone furoate-vilanteroL, 1 puff, inhalation, Daily  gabapentin, 100 mg, oral, BID  insulin lispro, 0-5 Units, subcutaneous, TID AC  losartan, 25 mg, oral, Daily  pantoprazole, 40 mg, oral, Daily  potassium chloride CR, 10 mEq, oral, Daily  sennosides-docusate sodium, 1 tablet, oral, Nightly  sertraline, 50 mg, oral, Daily  spironolactone, 25 mg, oral, Daily  tiotropium, 2 puff, inhalation, Daily  torsemide, 20 mg, oral, Daily  vancomycin, 1,500 mg, intravenous, q12h    [2]    [3] PRN medications: acetaminophen, albuterol, dextrose, dextrose, glucagon, glucagon, vancomycin

## 2025-08-01 NOTE — CONSULTS
Infectious Disease Inpatient Consult    Inpatient consult to Infectious Diseases  Consult performed by: Jarad Marshall MD  Consult ordered by: CRUZITO Tidwell-CNP          Primary MD: Lisseth Thomas DO    Reason For Consult  Osteomyelitis      Assessment/Plan:    #Right third digit edema  #Right second toe osteomyelitis  Patient recently finished 10 days of antibiotics.  ESR, CRP, WBC count normal.  Will wait for podiatry for further recommendations.  Monitor off antibiotics to maximize yield of the cultures    #DM: A1c 7.3 (06/17/25)    COPD  HTN  NAVYA      Recommendations:    - DC cefepime, Flagyl, vancomycin  - Monitor off antibiotics to maximize yield of the cultures  - Pending podiatry evaluation   - Thank you for the consult.  Will continue to follow    Complex antimicrobial therapy counseling and treatment.  Rationale for stopping antibiotics discussed.  Discussed with antimicrobial stewardship pharmacist.  Antibiotics discontinued to minimize risk of MDR organism, C. difficile and increase the yield of the cultures    Jarad Marshall MD  Date of service: 8/1/2025  Time of service: 8:06 AM      History Of Present Illness  Morro Seo is a 60 y.o. male with PMH of DM, HTN, NAVYA, COPD presented to the hospital with complaint of right foot swelling and pain.  Reports about 6 to 7 weeks ago, he was rubbing the sole of his foot with a scratcher, lost balance and scraped his foot 2nd and 3rd toe.  Then developed a wound which has slowly been healing.  Patient states that he kicked a vacuum  about a week ago and since then has been having pain.  Patient also was prescribed antibiotics which she finished    On admission, vital stable.  Labs showed elevated count 9.3, Hb 15, potassium 4.1, creatinine 0.8, AST/ALT normal, ESR 19, CRP 0.9.  Blood cultures were drawn and patient started on cefepime, Flagyl, vancomycin    CT right foot was done which showed skin ulceration of the plantar distal third  digit with generalized edema and cellulitis, osteomyelitis of the second distal phalanx.  Venous Doppler ultrasound did not show any DVT in the right lower extremity.  ID consulted for further antibiotic recommendation     Past Medical History  He has a past medical history of Anxiety, CHB (complete heart block), COPD (chronic obstructive pulmonary disease) (Multi), Diabetes mellitus (Multi), HLD (hyperlipidemia), HTN (hypertension), NAVYA (obstructive sleep apnea), and Restless leg syndrome ().    Surgical History  He has a past surgical history that includes Cardiac catheterization (N/A, 10/24/2024); pacemaker placement; Leg Surgery; Knee surgery; Foot surgery; Tonsillectomy; and Adenoidectomy.     Social History     Occupational History    Not on file   Tobacco Use    Smoking status: Former     Current packs/day: 0.00     Average packs/day: 2.0 packs/day for 40.1 years (80.2 ttl pk-yrs)     Types: Cigarettes     Start date: 1984     Quit date: 10/15/2024     Years since quittin.7    Smokeless tobacco: Never   Vaping Use    Vaping status: Never Used   Substance and Sexual Activity    Alcohol use: Yes     Alcohol/week: 1.0 - 2.0 standard drink of alcohol     Types: 1 - 2 Standard drinks or equivalent per week     Comment: rarely    Drug use: Not Currently    Sexual activity: Not Currently     Partners: Female     Birth control/protection: Female Sterilization     Travel History   Travel since 25        Location Start Date End Date     [place] ([place] 25 (defaulted) 25                 Family History  Family History[1]  Allergies  Jardiance [empagliflozin], Penicillins, and Sulfamethoxazole-trimethoprim     Immunization History   Administered Date(s) Administered    Tdap vaccine, age 7 year and older (BOOSTRIX, ADACEL) 2025    Zoster vaccine, recombinant, adult (SHINGRIX) 2018, 2018     Medications  Home medications:  Prescriptions Prior to  "Admission[2]  Current medications:  Scheduled medications  Scheduled Medications[3]  Continuous medications  Continuous Medications[4]  PRN medications  PRN Medications[5]    Review of Systems     Constitutional:  Denies appetite change, chills, fatigue, fever.  HENT:  Denies ear discharge, ear pain, sore throat    Eyes:  Denies photophobia, eye drainage  Respiratory:  Denies cough, choking, chest tightness, shortness of breath  Cardiovascular:  Denies chest pain, palpitations  Gastrointestinal:  Denies abdominal pain, diarrhea, nausea and vomiting.   Genitourinary:  Denies dysuria, flank pain, frequency  Musculoskeletal: Reports right foot pain  Neurological:  Denies light-headedness, numbness and headaches.    Objective  Range of Vitals (last 24 hours)  Heart Rate:  [68-85]   Temp:  [36.1 °C (96.9 °F)-37 °C (98.6 °F)]   Resp:  [16-20]   BP: ()/(58-78)   Height:  [177.8 cm (5' 10\")]   Weight:  [130 kg (286 lb 13.1 oz)]   SpO2:  [91 %-98 %]   Daily Weight  25 : 130 kg (286 lb 13.1 oz)    Body mass index is 41.15 kg/m².     Physical Exam  /70 (BP Location: Left arm, Patient Position: Lying)   Pulse 75   Temp 36.1 °C (96.9 °F) (Temporal)   Resp 17   Ht 1.778 m (5' 10\")   Wt 130 kg (286 lb 13.1 oz)   SpO2 94%   BMI 41.15 kg/m²   Temp (24hrs), Av.3 °C (97.3 °F), Min:36.1 °C (96.9 °F), Max:37 °C (98.6 °F)      General: alert, oriented, NAD  HEENT: No conjunctival pallor, no scleral icterus  Lungs: bilaterally clear to auscultation, no wheezing  Abdomen: soft, non tender, non distended, BS+  Neuro: AAO x 3, CN grossly intact  Extremities: B/L LE edema  Skin: R 3rd toe ulcer on the plantar aspect, swollen, no draiage. 2nd callus seen and also on the plantar aspect of the first toe.  Left second toe distal amputation  MSK: No joint inflammation     Relevant Results    Labs  Results from last 72 hours   Lab Units 25  0504 25  1044   WBC AUTO x10*3/uL 8.6 9.3   HEMOGLOBIN g/dL 13.4* " "15.0   HEMATOCRIT % 41.6 44.4   PLATELETS AUTO x10*3/uL 235 250   NEUTROS PCT AUTO %  --  81.2   LYMPHS PCT AUTO %  --  11.7   MONOS PCT AUTO %  --  5.4   EOS PCT AUTO %  --  1.0     Results from last 72 hours   Lab Units 08/01/25  0504 07/31/25  1044   SODIUM mmol/L 138 139   POTASSIUM mmol/L 4.0 4.1   CHLORIDE mmol/L 104 101   CO2 mmol/L 30 31   BUN mg/dL 10 11   CREATININE mg/dL 0.92 0.85   GLUCOSE mg/dL 188* 81   CALCIUM mg/dL 9.3 10.1   ANION GAP mmol/L 8* 11   EGFR mL/min/1.73m*2 >90 >90     Results from last 72 hours   Lab Units 07/31/25  1044   ALK PHOS U/L 83   BILIRUBIN TOTAL mg/dL 1.0   PROTEIN TOTAL g/dL 7.6   ALT U/L 23   AST U/L 20   ALBUMIN g/dL 4.5     Estimated Creatinine Clearance: 115.7 mL/min (by C-G formula based on SCr of 0.92 mg/dL).  C-Reactive Protein   Date Value Ref Range Status   07/31/2025 0.91 <1.00 mg/dL Final     CRP   Date Value Ref Range Status   08/16/2020 0.24 mg/dL Final     Comment:     REF VALUE  < 1.00     08/10/2020 0.39 mg/dL Final     Comment:     REF VALUE  < 1.00       Sedimentation Rate   Date Value Ref Range Status   07/31/2025 19 0 - 20 mm/h Final   05/09/2023 15 0 - 20 mm/h Final   08/16/2020 15 0 - 20 mm/h Final     No results found for: \"HIV1X2\", \"HIVCONF\", \"[id number]\"  Hepatitis C Ab   Date Value Ref Range Status   06/25/2022 NONREACTIVE NONREACTIVE Final     Comment:      Results from patients taking biotin supplements or receiving   high-dose biotin therapy should be interpreted with caution   due to possible interference with this test. Providers may    contact their local laboratory for further information.         Microbiology  No results found for the last 14 days.      Imaging  Vascular US Lower Extremity Venous Duplex Right  Result Date: 7/31/2025  No sonographic evidence for deep vein thrombosis within the evaluated veins of the right lower extremity. The calf veins were not visualized due to overlying edema.   MACRO: None     Signed by: Gregory Quintanilla " 7/31/2025 12:08 PM Dictation workstation:   [id number]    CT foot right wo IV contrast  Result Date: 7/31/2025  1. Skin ulceration of the plantar distal 3rd digit with generalized subcutaneous edema and cellulitis.   2. Osteomyelitis of the tuft 2nd distal phalanx with superimposed subtle fracture within the tuft.     MACRO: None   Signed by: Johana Heredia 7/31/2025 11:39 AM Dictation workstation:   [id number]             [1]   Family History  Problem Relation Name Age of Onset    Heart attack Mother      Hypertension Mother      Coronary artery disease Father      Heart attack Father      Hypertension Father      Diabetes Sister      Diabetes Brother      Lung cancer Maternal Grandfather      Lung cancer Paternal Grandfather      Depression Mother Whitley 20 - 29    COPD Father Pops 50 - 59    Early natural death Father Pops 50 - 59    Early natural death Sister Deedee 60 - 69   [2]   Medications Prior to Admission   Medication Sig Dispense Refill Last Dose/Taking    omeprazole (PriLOSEC) 40 mg DR capsule Take 1 capsule (40 mg) by mouth once daily in the morning. Take before meals. Do not crush or chew. 90 capsule 0 7/31/2025    potassium chloride CR 10 mEq ER tablet Take 1 tablet (10 mEq) by mouth once daily. 90 tablet 1 7/31/2025    spironolactone (Aldactone) 25 mg tablet Take 1 tablet (25 mg) by mouth once daily. 90 tablet 1 7/31/2025    torsemide (Demadex) 20 mg tablet Take 1 tablet (20 mg) by mouth once daily. as directed 90 tablet 1 7/31/2025    acarbose (Precose) 50 mg tablet Take 1 tablet (50 mg) by mouth 3 times daily (morning, midday, late afternoon). 270 tablet 0 Unknown    albuterol 90 mcg/actuation inhaler Inhale 2 puffs every 4 hours if needed for shortness of breath. 54 g 3 Unknown    ARIPiprazole (Abilify) 5 mg tablet Take 1 tablet (5 mg) by mouth once daily.       atorvastatin (Lipitor) 80 mg tablet Take 1 tablet (80 mg) by mouth once daily. 90 tablet 1 Unknown    blood sugar diagnostic (Blood  Glucose Test) strip Test once daily in the morning 100 each 3     blood-glucose meter misc Use to test blood sugars once daily 1 each 0     budesonide-formoterol (Symbicort) 160-4.5 mcg/actuation inhaler Inhale 2 puffs 2 times a day. Rinse mouth with water after use to reduce aftertaste and incidence of candidiasis. Do not swallow. 30.6 g 3 Unknown    buPROPion SR (Wellbutrin SR) 150 mg 12 hr tablet Take 1 tablet (150 mg) by mouth 2 times a day. Do not crush, chew, or split. 180 tablet 0 Unknown    dapagliflozin propanediol (Farxiga) 10 mg Take 1 tablet (10 mg) by mouth once daily. 30 tablet 11     dulaglutide (Trulicity) 4.5 mg/0.5 mL pen injector Inject 4.5 mg under the skin 1 (one) time per week. (Patient taking differently: Inject 4.5 mg under the skin 1 (one) time per week. TUESDAYS) 6 mL 0     FreeStyle Aliza 3 Sensor device Use to check blood sugars daily. Change sensors every 14 days 6 each 3     gabapentin (Neurontin) 100 mg capsule Take 1 capsule (100 mg) by mouth 2 times a day. 180 capsule 0     glipiZIDE (Glucotrol) 5 mg tablet Take 1 tablet (5 mg) by mouth 2 times a day before meals. 180 tablet 0 Unknown    ipratropium-albuteroL (Duo-Neb) 0.5-2.5 mg/3 mL nebulizer solution Take 3 mL by nebulization every 4 hours if needed for wheezing. 1080 mL 3 Unknown    lancets misc Test once daily in the morning 100 each 3     losartan (Cozaar) 25 mg tablet Take 1 tablet (25 mg) by mouth once daily. 90 tablet 1 Unknown    metFORMIN (Glucophage) 1,000 mg tablet Take 1 tablet (1,000 mg) by mouth 2 times daily (morning and late afternoon). 180 tablet 0     OneTouch Delica Plus Lancet 30 gauge misc TEST ONCE DAILY IN THE MORNING       sertraline (Zoloft) 50 mg tablet Take 1 tablet (50 mg) by mouth once daily. 30 tablet 1 Unknown    tiotropium (Spiriva Respimat) 2.5 mcg/actuation inhaler Inhale 2 puffs once daily. 12 g 3    [3] acarbose, 50 mg, oral, TID  ARIPiprazole, 5 mg, oral, Daily  atorvastatin, 80 mg, oral,  Nightly  buPROPion SR, 150 mg, oral, BID  cefepime, 2 g, intravenous, q8h  enoxaparin, 40 mg, subcutaneous, Daily  fluticasone furoate-vilanteroL, 1 puff, inhalation, Daily  gabapentin, 100 mg, oral, BID  insulin lispro, 0-5 Units, subcutaneous, TID AC  losartan, 25 mg, oral, Daily  metroNIDAZOLE, 500 mg, intravenous, q8h  pantoprazole, 40 mg, oral, Daily  potassium chloride CR, 10 mEq, oral, Daily  sennosides-docusate sodium, 1 tablet, oral, Nightly  sertraline, 50 mg, oral, Daily  spironolactone, 25 mg, oral, Daily  tiotropium, 2 puff, inhalation, Daily  torsemide, 20 mg, oral, Daily  vancomycin, 1,500 mg, intravenous, q12h    [4]    [5] PRN medications: acetaminophen, albuterol, dextrose, dextrose, glucagon, glucagon, vancomycin

## 2025-08-02 ENCOUNTER — SURGERY (OUTPATIENT)
Age: 60
End: 2025-08-02
Payer: MEDICARE

## 2025-08-02 ENCOUNTER — ANESTHESIA EVENT (OUTPATIENT)
Dept: OPERATING ROOM | Facility: HOSPITAL | Age: 60
End: 2025-08-02
Payer: MEDICARE

## 2025-08-02 ENCOUNTER — ANESTHESIA (OUTPATIENT)
Dept: OPERATING ROOM | Facility: HOSPITAL | Age: 60
End: 2025-08-02
Payer: MEDICARE

## 2025-08-02 LAB
GLUCOSE BLD MANUAL STRIP-MCNC: 119 MG/DL (ref 74–99)
GLUCOSE BLD MANUAL STRIP-MCNC: 130 MG/DL (ref 74–99)
GLUCOSE BLD MANUAL STRIP-MCNC: 139 MG/DL (ref 74–99)
GLUCOSE BLD MANUAL STRIP-MCNC: 175 MG/DL (ref 74–99)

## 2025-08-02 PROCEDURE — 2500000002 HC RX 250 W HCPCS SELF ADMINISTERED DRUGS (ALT 637 FOR MEDICARE OP, ALT 636 FOR OP/ED): Performed by: PODIATRIST

## 2025-08-02 PROCEDURE — 0Y6M0ZC DETACHMENT AT RIGHT FOOT, PARTIAL 3RD RAY, OPEN APPROACH: ICD-10-PCS | Performed by: PODIATRIST

## 2025-08-02 PROCEDURE — 82947 ASSAY GLUCOSE BLOOD QUANT: CPT

## 2025-08-02 PROCEDURE — 2500000004 HC RX 250 GENERAL PHARMACY W/ HCPCS (ALT 636 FOR OP/ED)

## 2025-08-02 PROCEDURE — 3600000008 HC OR TIME - EACH INCREMENTAL 1 MINUTE - PROCEDURE LEVEL THREE: Performed by: PODIATRIST

## 2025-08-02 PROCEDURE — 2500000004 HC RX 250 GENERAL PHARMACY W/ HCPCS (ALT 636 FOR OP/ED): Performed by: PODIATRIST

## 2025-08-02 PROCEDURE — 99233 SBSQ HOSP IP/OBS HIGH 50: CPT

## 2025-08-02 PROCEDURE — 2720000007 HC OR 272 NO HCPCS: Performed by: PODIATRIST

## 2025-08-02 PROCEDURE — 7100000002 HC RECOVERY ROOM TIME - EACH INCREMENTAL 1 MINUTE: Performed by: PODIATRIST

## 2025-08-02 PROCEDURE — 2500000001 HC RX 250 WO HCPCS SELF ADMINISTERED DRUGS (ALT 637 FOR MEDICARE OP): Performed by: PODIATRIST

## 2025-08-02 PROCEDURE — 2500000004 HC RX 250 GENERAL PHARMACY W/ HCPCS (ALT 636 FOR OP/ED): Performed by: NURSE ANESTHETIST, CERTIFIED REGISTERED

## 2025-08-02 PROCEDURE — 3700000001 HC GENERAL ANESTHESIA TIME - INITIAL BASE CHARGE: Performed by: PODIATRIST

## 2025-08-02 PROCEDURE — 3600000003 HC OR TIME - INITIAL BASE CHARGE - PROCEDURE LEVEL THREE: Performed by: PODIATRIST

## 2025-08-02 PROCEDURE — 2500000004 HC RX 250 GENERAL PHARMACY W/ HCPCS (ALT 636 FOR OP/ED): Performed by: PHARMACIST

## 2025-08-02 PROCEDURE — 1210000001 HC SEMI-PRIVATE ROOM DAILY

## 2025-08-02 PROCEDURE — 7100000001 HC RECOVERY ROOM TIME - INITIAL BASE CHARGE: Performed by: PODIATRIST

## 2025-08-02 PROCEDURE — 87077 CULTURE AEROBIC IDENTIFY: CPT | Mod: PORLAB | Performed by: PODIATRIST

## 2025-08-02 PROCEDURE — 3700000002 HC GENERAL ANESTHESIA TIME - EACH INCREMENTAL 1 MINUTE: Performed by: PODIATRIST

## 2025-08-02 RX ORDER — BUPIVACAINE HYDROCHLORIDE 5 MG/ML
INJECTION, SOLUTION PERINEURAL AS NEEDED
Status: DISCONTINUED | OUTPATIENT
Start: 2025-08-02 | End: 2025-08-02 | Stop reason: HOSPADM

## 2025-08-02 RX ORDER — VANCOMYCIN HYDROCHLORIDE 1.5 G/300ML
1500 INJECTION, SOLUTION INTRAVITREAL EVERY 12 HOURS
Status: DISCONTINUED | OUTPATIENT
Start: 2025-08-02 | End: 2025-08-04 | Stop reason: HOSPADM

## 2025-08-02 RX ORDER — ONDANSETRON HYDROCHLORIDE 2 MG/ML
INJECTION, SOLUTION INTRAVENOUS AS NEEDED
Status: DISCONTINUED | OUTPATIENT
Start: 2025-08-02 | End: 2025-08-02

## 2025-08-02 RX ORDER — FENTANYL CITRATE 50 UG/ML
INJECTION, SOLUTION INTRAMUSCULAR; INTRAVENOUS AS NEEDED
Status: DISCONTINUED | OUTPATIENT
Start: 2025-08-02 | End: 2025-08-02

## 2025-08-02 RX ORDER — PROPOFOL 10 MG/ML
INJECTION, EMULSION INTRAVENOUS AS NEEDED
Status: DISCONTINUED | OUTPATIENT
Start: 2025-08-02 | End: 2025-08-02

## 2025-08-02 RX ORDER — CEFTRIAXONE 2 G/50ML
2 INJECTION, SOLUTION INTRAVENOUS EVERY 24 HOURS
Status: DISCONTINUED | OUTPATIENT
Start: 2025-08-02 | End: 2025-08-04 | Stop reason: HOSPADM

## 2025-08-02 RX ORDER — MORPHINE SULFATE 2 MG/ML
2 INJECTION, SOLUTION INTRAMUSCULAR; INTRAVENOUS EVERY 5 MIN PRN
Status: DISCONTINUED | OUTPATIENT
Start: 2025-08-02 | End: 2025-08-02 | Stop reason: HOSPADM

## 2025-08-02 RX ORDER — LABETALOL HYDROCHLORIDE 5 MG/ML
5 INJECTION, SOLUTION INTRAVENOUS ONCE AS NEEDED
Status: DISCONTINUED | OUTPATIENT
Start: 2025-08-02 | End: 2025-08-02 | Stop reason: HOSPADM

## 2025-08-02 RX ORDER — SODIUM CHLORIDE, SODIUM LACTATE, POTASSIUM CHLORIDE, CALCIUM CHLORIDE 600; 310; 30; 20 MG/100ML; MG/100ML; MG/100ML; MG/100ML
100 INJECTION, SOLUTION INTRAVENOUS CONTINUOUS
Status: DISCONTINUED | OUTPATIENT
Start: 2025-08-02 | End: 2025-08-02 | Stop reason: HOSPADM

## 2025-08-02 RX ORDER — DROPERIDOL 2.5 MG/ML
0.62 INJECTION, SOLUTION INTRAMUSCULAR; INTRAVENOUS ONCE AS NEEDED
Status: CANCELLED | OUTPATIENT
Start: 2025-08-02

## 2025-08-02 RX ORDER — ONDANSETRON HYDROCHLORIDE 2 MG/ML
4 INJECTION, SOLUTION INTRAVENOUS ONCE AS NEEDED
Status: DISCONTINUED | OUTPATIENT
Start: 2025-08-02 | End: 2025-08-02 | Stop reason: HOSPADM

## 2025-08-02 RX ORDER — MIDAZOLAM HYDROCHLORIDE 1 MG/ML
INJECTION, SOLUTION INTRAMUSCULAR; INTRAVENOUS AS NEEDED
Status: DISCONTINUED | OUTPATIENT
Start: 2025-08-02 | End: 2025-08-02

## 2025-08-02 RX ORDER — METOCLOPRAMIDE HYDROCHLORIDE 5 MG/ML
INJECTION INTRAMUSCULAR; INTRAVENOUS AS NEEDED
Status: DISCONTINUED | OUTPATIENT
Start: 2025-08-02 | End: 2025-08-02

## 2025-08-02 RX ORDER — OXYCODONE AND ACETAMINOPHEN 5; 325 MG/1; MG/1
1 TABLET ORAL EVERY 4 HOURS PRN
Status: DISCONTINUED | OUTPATIENT
Start: 2025-08-02 | End: 2025-08-02 | Stop reason: HOSPADM

## 2025-08-02 RX ORDER — LIDOCAINE HYDROCHLORIDE 10 MG/ML
0.1 INJECTION, SOLUTION EPIDURAL; INFILTRATION; INTRACAUDAL; PERINEURAL ONCE
Status: DISCONTINUED | OUTPATIENT
Start: 2025-08-02 | End: 2025-08-02 | Stop reason: HOSPADM

## 2025-08-02 RX ORDER — FAMOTIDINE 10 MG/ML
20 INJECTION, SOLUTION INTRAVENOUS ONCE
Status: CANCELLED | OUTPATIENT
Start: 2025-08-02 | End: 2025-08-02

## 2025-08-02 RX ORDER — ALBUTEROL SULFATE 0.83 MG/ML
2.5 SOLUTION RESPIRATORY (INHALATION) ONCE AS NEEDED
Status: DISCONTINUED | OUTPATIENT
Start: 2025-08-02 | End: 2025-08-02 | Stop reason: HOSPADM

## 2025-08-02 RX ORDER — VANCOMYCIN HYDROCHLORIDE 1 G/20ML
INJECTION, POWDER, LYOPHILIZED, FOR SOLUTION INTRAVENOUS DAILY PRN
Status: DISCONTINUED | OUTPATIENT
Start: 2025-08-02 | End: 2025-08-04 | Stop reason: HOSPADM

## 2025-08-02 RX ORDER — DIPHENHYDRAMINE HYDROCHLORIDE 50 MG/ML
12.5 INJECTION, SOLUTION INTRAMUSCULAR; INTRAVENOUS ONCE AS NEEDED
Status: DISCONTINUED | OUTPATIENT
Start: 2025-08-02 | End: 2025-08-02 | Stop reason: HOSPADM

## 2025-08-02 RX ORDER — SODIUM CHLORIDE, SODIUM LACTATE, POTASSIUM CHLORIDE, CALCIUM CHLORIDE 600; 310; 30; 20 MG/100ML; MG/100ML; MG/100ML; MG/100ML
75 INJECTION, SOLUTION INTRAVENOUS CONTINUOUS
Status: CANCELLED | OUTPATIENT
Start: 2025-08-02 | End: 2025-08-02

## 2025-08-02 RX ORDER — HYDRALAZINE HYDROCHLORIDE 20 MG/ML
5 INJECTION INTRAMUSCULAR; INTRAVENOUS EVERY 30 MIN PRN
Status: DISCONTINUED | OUTPATIENT
Start: 2025-08-02 | End: 2025-08-02 | Stop reason: HOSPADM

## 2025-08-02 RX ADMIN — SODIUM CHLORIDE, POTASSIUM CHLORIDE, SODIUM LACTATE AND CALCIUM CHLORIDE: 600; 310; 30; 20 INJECTION, SOLUTION INTRAVENOUS at 08:52

## 2025-08-02 RX ADMIN — PANTOPRAZOLE SODIUM 40 MG: 40 TABLET, DELAYED RELEASE ORAL at 09:00

## 2025-08-02 RX ADMIN — FENTANYL CITRATE 25 MCG: 50 INJECTION INTRAMUSCULAR; INTRAVENOUS at 09:01

## 2025-08-02 RX ADMIN — ONDANSETRON 4 MG: 2 INJECTION, SOLUTION INTRAMUSCULAR; INTRAVENOUS at 09:24

## 2025-08-02 RX ADMIN — ENOXAPARIN SODIUM 40 MG: 100 INJECTION SUBCUTANEOUS at 13:48

## 2025-08-02 RX ADMIN — FLUTICASONE FUROATE AND VILANTEROL TRIFENATATE 1 PUFF: 200; 25 POWDER RESPIRATORY (INHALATION) at 18:01

## 2025-08-02 RX ADMIN — GABAPENTIN 100 MG: 100 CAPSULE ORAL at 09:00

## 2025-08-02 RX ADMIN — PROPOFOL 200 MG: 10 INJECTION, EMULSION INTRAVENOUS at 08:59

## 2025-08-02 RX ADMIN — FENTANYL CITRATE 25 MCG: 50 INJECTION INTRAMUSCULAR; INTRAVENOUS at 09:03

## 2025-08-02 RX ADMIN — CEFTRIAXONE 2 G: 2 INJECTION, SOLUTION INTRAVENOUS at 13:48

## 2025-08-02 RX ADMIN — SPIRONOLACTONE 25 MG: 25 TABLET ORAL at 09:00

## 2025-08-02 RX ADMIN — BUPIVACAINE HYDROCHLORIDE 10 ML: 5 INJECTION, SOLUTION PERINEURAL at 09:25

## 2025-08-02 RX ADMIN — ARIPIPRAZOLE 5 MG: 5 TABLET ORAL at 09:00

## 2025-08-02 RX ADMIN — TIOTROPIUM BROMIDE INHALATION SPRAY 2 PUFF: 3.12 SPRAY, METERED RESPIRATORY (INHALATION) at 18:01

## 2025-08-02 RX ADMIN — ACARBOSE 50 MG: 25 TABLET ORAL at 17:06

## 2025-08-02 RX ADMIN — FENTANYL CITRATE 50 MCG: 50 INJECTION INTRAMUSCULAR; INTRAVENOUS at 08:59

## 2025-08-02 RX ADMIN — ATORVASTATIN CALCIUM 80 MG: 40 TABLET, FILM COATED ORAL at 19:58

## 2025-08-02 RX ADMIN — METOCLOPRAMIDE 5 MG: 5 INJECTION, SOLUTION INTRAMUSCULAR; INTRAVENOUS at 08:53

## 2025-08-02 RX ADMIN — ACARBOSE 50 MG: 25 TABLET ORAL at 10:39

## 2025-08-02 RX ADMIN — MIDAZOLAM 2 MG: 1 INJECTION INTRAMUSCULAR; INTRAVENOUS at 08:57

## 2025-08-02 RX ADMIN — BUPROPION HYDROCHLORIDE 150 MG: 150 TABLET, FILM COATED, EXTENDED RELEASE ORAL at 19:58

## 2025-08-02 RX ADMIN — LOSARTAN POTASSIUM 25 MG: 25 TABLET, FILM COATED ORAL at 09:00

## 2025-08-02 RX ADMIN — ACETAMINOPHEN 650 MG: 325 TABLET ORAL at 17:16

## 2025-08-02 RX ADMIN — ACETAMINOPHEN 650 MG: 325 TABLET ORAL at 21:36

## 2025-08-02 RX ADMIN — GABAPENTIN 100 MG: 100 CAPSULE ORAL at 19:58

## 2025-08-02 RX ADMIN — TORSEMIDE 20 MG: 20 TABLET ORAL at 09:00

## 2025-08-02 RX ADMIN — VANCOMYCIN HYDROCHLORIDE 1.5 G: 1.5 INJECTION, SOLUTION INTRAVITREAL at 14:48

## 2025-08-02 RX ADMIN — POTASSIUM CHLORIDE 10 MEQ: 750 TABLET, EXTENDED RELEASE ORAL at 09:00

## 2025-08-02 RX ADMIN — SERTRALINE HYDROCHLORIDE 50 MG: 50 TABLET ORAL at 09:00

## 2025-08-02 RX ADMIN — BUPROPION HYDROCHLORIDE 150 MG: 150 TABLET, FILM COATED, EXTENDED RELEASE ORAL at 09:00

## 2025-08-02 RX ADMIN — METOCLOPRAMIDE 5 MG: 5 INJECTION, SOLUTION INTRAMUSCULAR; INTRAVENOUS at 08:56

## 2025-08-02 SDOH — HEALTH STABILITY: MENTAL HEALTH: CURRENT SMOKER: 0

## 2025-08-02 ASSESSMENT — PAIN - FUNCTIONAL ASSESSMENT
PAIN_FUNCTIONAL_ASSESSMENT: 0-10

## 2025-08-02 ASSESSMENT — COGNITIVE AND FUNCTIONAL STATUS - GENERAL
MOBILITY SCORE: 24
DAILY ACTIVITIY SCORE: 24

## 2025-08-02 ASSESSMENT — PAIN SCALES - GENERAL
PAINLEVEL_OUTOF10: 5 - MODERATE PAIN
PAINLEVEL_OUTOF10: 3
PAINLEVEL_OUTOF10: 0 - NO PAIN
PAINLEVEL_OUTOF10: 0 - NO PAIN
PAIN_LEVEL: 0
PAINLEVEL_OUTOF10: 0 - NO PAIN
PAINLEVEL_OUTOF10: 2
PAINLEVEL_OUTOF10: 0 - NO PAIN

## 2025-08-02 ASSESSMENT — PAIN DESCRIPTION - ORIENTATION
ORIENTATION: RIGHT
ORIENTATION: RIGHT

## 2025-08-02 ASSESSMENT — PAIN DESCRIPTION - LOCATION
LOCATION: FOOT
LOCATION: FOOT

## 2025-08-02 ASSESSMENT — PAIN DESCRIPTION - DESCRIPTORS: DESCRIPTORS: DISCOMFORT

## 2025-08-02 NOTE — PROGRESS NOTES
Vancomycin Dosing by Pharmacy- INITIAL    Morro Seo is a 60 y.o. year old male who Pharmacy has been consulted for vancomycin dosing for osteomyelitis/septic arthritis. Based on the patient's indication and renal status this patient will be dosed based on a goal AUC of 400-600.     Renal function is currently stable.    Visit Vitals  /66 (BP Location: Left arm, Patient Position: Lying)   Pulse 81   Temp 35.2 °C (95.3 °F) (Temporal)   Resp 18        Lab Results   Component Value Date    CREATININE 0.92 2025    CREATININE 0.85 2025    CREATININE 0.89 2025    CREATININE 0.75 2025    CREATININE 0.95 2025    CREATININE 0.94 2024        Patient weight is as follows:   Vitals:    25 1014   Weight: 130 kg (286 lb 13.1 oz)       Cultures:  No results found for the encounter in last 14 days.        I/O last 3 completed shifts:  In: 1760 (13.5 mL/kg) [P.O.:1160; IV Piggyback:600]  Out: - (0 mL/kg)   Weight: 130.1 kg   I/O during current shift:  I/O this shift:  In: 336 [P.O.:236; IV Piggyback:100]  Out: 125 [Urine:125]    Temp (24hrs), Av.1 °C (97 °F), Min:35.2 °C (95.3 °F), Max:36.6 °C (97.9 °F)         Assessment/Plan     Patient will not be given a loading dose.  Will initiate vancomycin maintenance, 1500 mg every 12 hours.    This dosing regimen is predicted by [employer] to result in the following pharmacokinetic parameters:  DoseAssist   1500mg(11.5mg/kg) 12hours 1.5hours  502mg/L.hr 516mg/L.hr 12.8mg/L 68% 31%     Patient re-started on vancomycin after podiatry procedure. Patient has previously received vancomycin 2000mg  @ 1359 and 1500mg on  @ 0831    Follow-up level will be ordered on  with AM labs unless clinically indicated sooner.  Will continue to monitor renal function daily while on vancomycin and order serum creatinine at least every 48 hours if not already ordered.  Follow for continued vancomycin needs, clinical response, and signs/symptoms  of toxicity.       Pam Mancini, PharmD

## 2025-08-02 NOTE — CARE PLAN
The patient's goals for the shift include      The clinical goals for the shift include Patient will remain free from falls and injuries throughout the shift.

## 2025-08-02 NOTE — PROGRESS NOTES
"[facility] INFECTIOUS DISEASE PROGRESS NOTE    Patient Name: Morro Seo  MRN: [id number]    INTERVAL HISTORY:   No fevers or chills.  Seen by podiatry and underwent right third toe amputation.  Foot pain present    Problem List[1]     ASSESSMENT:   #Right third digit edema  #Right second toe osteomyelitis  The pt has a complicated podiatric hx including hallux valgus and rigidus of both feet and pes valgus and planus of the right foot s/p R decompression osteotomy and bunionectomy w/implant placement, distal phalanx resection and MPJ capsulotomy of the R 1st digit in   He underwent removal of the hardware and I+D in  due to cellulitis and symptomatic internal fixation displaced/dislodged right foot   He follows with his podiatrist Dr. Pavan Powers  Patient recently finished 10 days of antibiotics.  ESR, CRP, WBC count normal.  Monitoring off antibiotics to maximize yield of the cultures       #DM: A1c 7.3 (25)     COPD  HTN  NAVYA        Recommendations:  Postoperative day 0, partial amputation of the right third toe at metatarsophalangeal joint  - Start vancomycin  Start ceftriaxone  Check vancomycin trough prior to the fourth dose  Obtain operative cultures  If proximal cultures remain negative, may not need prolonged IV antibiotic course as amputation has been performed with hopefully removal of all infected bone    MEDICATIONS: reviewed.  Current Medications[2]     PHYSICAL EXAM:  Vital signs: /66 (BP Location: Left arm, Patient Position: Lying)   Pulse 81   Temp 35.2 °C (95.3 °F) (Temporal)   Resp 18   Ht 1.778 m (5' 10\")   Wt 130 kg (286 lb 13.1 oz)   SpO2 94%   BMI 41.15 kg/m²   Temp (24hrs), Av.1 °C (97 °F), Min:35.2 °C (95.3 °F), Max:36.6 °C (97.9 °F)    General: alert, oriented, NAD  Lungs: bilaterally clear to auscultation  Heart: regular rate and rhythm  Abdomen: soft, non tender, non distended, BS+  Extremities: Right foot with postoperative dressing  No rashes  No joint " inflammation  Neck supple  Lines ok  No CVAT              Labs:    Results for orders placed or performed during the hospital encounter of 07/31/25 (from the past 96 hours)   CBC and Auto Differential   Result Value Ref Range    WBC 9.3 4.4 - 11.3 x10*3/uL    nRBC 0.0 0.0 - 0.0 /100 WBCs    RBC 4.92 4.50 - 5.90 x10*6/uL    Hemoglobin 15.0 13.5 - 17.5 g/dL    Hematocrit 44.4 41.0 - 52.0 %    MCV 90 80 - 100 fL    MCH 30.5 26.0 - 34.0 pg    MCHC 33.8 32.0 - 36.0 g/dL    RDW 14.3 11.5 - 14.5 %    Platelets 250 150 - 450 x10*3/uL    Neutrophils % 81.2 40.0 - 80.0 %    Immature Granulocytes %, Automated 0.4 0.0 - 0.9 %    Lymphocytes % 11.7 13.0 - 44.0 %    Monocytes % 5.4 2.0 - 10.0 %    Eosinophils % 1.0 0.0 - 6.0 %    Basophils % 0.3 0.0 - 2.0 %    Neutrophils Absolute 7.59 1.20 - 7.70 x10*3/uL    Immature Granulocytes Absolute, Automated 0.04 0.00 - 0.70 x10*3/uL    Lymphocytes Absolute 1.09 (L) 1.20 - 4.80 x10*3/uL    Monocytes Absolute 0.50 0.10 - 1.00 x10*3/uL    Eosinophils Absolute 0.09 0.00 - 0.70 x10*3/uL    Basophils Absolute 0.03 0.00 - 0.10 x10*3/uL   Comprehensive metabolic panel   Result Value Ref Range    Glucose 81 74 - 99 mg/dL    Sodium 139 136 - 145 mmol/L    Potassium 4.1 3.5 - 5.3 mmol/L    Chloride 101 98 - 107 mmol/L    Bicarbonate 31 21 - 32 mmol/L    Anion Gap 11 10 - 20 mmol/L    Urea Nitrogen 11 6 - 23 mg/dL    Creatinine 0.85 0.50 - 1.30 mg/dL    eGFR >90 >60 mL/min/1.73m*2    Calcium 10.1 8.6 - 10.3 mg/dL    Albumin 4.5 3.4 - 5.0 g/dL    Alkaline Phosphatase 83 33 - 136 U/L    Total Protein 7.6 6.4 - 8.2 g/dL    AST 20 9 - 39 U/L    Bilirubin, Total 1.0 0.0 - 1.2 mg/dL    ALT 23 10 - 52 U/L   Sedimentation rate, automated   Result Value Ref Range    Sedimentation Rate 19 0 - 20 mm/h   C-reactive protein   Result Value Ref Range    C-Reactive Protein 0.91 <1.00 mg/dL   Blood Culture    Specimen: Peripheral Venipuncture; Blood culture   Result Value Ref Range    Blood Culture No growth at 1  day    Blood Culture    Specimen: Peripheral Venipuncture; Blood culture   Result Value Ref Range    Blood Culture No growth at 1 day    POCT GLUCOSE   Result Value Ref Range    POCT Glucose 184 (H) 74 - 99 mg/dL   POCT GLUCOSE   Result Value Ref Range    POCT Glucose 160 (H) 74 - 99 mg/dL   Basic Metabolic Panel   Result Value Ref Range    Glucose 188 (H) 74 - 99 mg/dL    Sodium 138 136 - 145 mmol/L    Potassium 4.0 3.5 - 5.3 mmol/L    Chloride 104 98 - 107 mmol/L    Bicarbonate 30 21 - 32 mmol/L    Anion Gap 8 (L) 10 - 20 mmol/L    Urea Nitrogen 10 6 - 23 mg/dL    Creatinine 0.92 0.50 - 1.30 mg/dL    eGFR >90 >60 mL/min/1.73m*2    Calcium 9.3 8.6 - 10.3 mg/dL   CBC   Result Value Ref Range    WBC 8.6 4.4 - 11.3 x10*3/uL    nRBC 0.0 0.0 - 0.0 /100 WBCs    RBC 4.55 4.50 - 5.90 x10*6/uL    Hemoglobin 13.4 (L) 13.5 - 17.5 g/dL    Hematocrit 41.6 41.0 - 52.0 %    MCV 91 80 - 100 fL    MCH 29.5 26.0 - 34.0 pg    MCHC 32.2 32.0 - 36.0 g/dL    RDW 14.3 11.5 - 14.5 %    Platelets 235 150 - 450 x10*3/uL   POCT GLUCOSE   Result Value Ref Range    POCT Glucose 166 (H) 74 - 99 mg/dL   POCT GLUCOSE   Result Value Ref Range    POCT Glucose 178 (H) 74 - 99 mg/dL   POCT GLUCOSE   Result Value Ref Range    POCT Glucose 156 (H) 74 - 99 mg/dL   Tissue/Wound Culture/Smear    Specimen: Wound/Tissue; Tissue/Biopsy   Result Value Ref Range    Tissue/Wound Culture/Smear No growth to date     Gram Stain (1+) Rare Polymorphonuclear leukocytes (A)     Gram Stain (2+) Few Gram positive cocci (A)     Gram Stain (2+) Few Gram negative bacilli (A)    POCT GLUCOSE   Result Value Ref Range    POCT Glucose 144 (H) 74 - 99 mg/dL   POCT GLUCOSE   Result Value Ref Range    POCT Glucose 119 (H) 74 - 99 mg/dL   POCT GLUCOSE   Result Value Ref Range    POCT Glucose 130 (H) 74 - 99 mg/dL     *Note: Due to a large number of results and/or encounters for the requested time period, some results have not been displayed. A complete set of results can be found  in Results Review.      Microbiology data: reviewed    Imaging data: reviewed      Caleb Obrien  Pager:[phone]  Date of service: 8/2/2025  Time of service: 2:17 PM         [1]   Patient Active Problem List  Diagnosis    Hyperlipidemia    Allergic rhinitis    Anxiety state    Benign paroxysmal positional vertigo    Type 2 diabetes mellitus, without long-term current use of insulin (Multi)    COPD (chronic obstructive pulmonary disease) (Multi)    Depression    Essential hypertension    Left ventricular hypertrophy    Leg swelling    Morbid obesity (Multi)    Multiple pulmonary nodules    Nocturia    Obstructive sleep apnea    Osteoarthritis    Other peripheral vertigo, unspecified ear    Sensorineural hearing loss, asymmetrical    Status cardiac pacemaker    Vertigo    Wheezing on auscultation    Tiredness    Chronic diastolic (congestive) heart failure    Erythrocytosis    Cervicalgia    Bilateral shoulder pain    Poor posture    Hematemesis    Agatston coronary artery calcium score greater than 400    Abnormal dobutamine stress echocardiography    Generalized anxiety disorder    Other osteomyelitis of right foot    Acute osteomyelitis of phalanx of right foot (Multi)    Diabetic ulcer of toe of right foot associated with type 2 diabetes mellitus, with necrosis of bone   [2]   Current Facility-Administered Medications:     acarbose (Precose) tablet 50 mg, 50 mg, oral, TID, Maykel Hirshc DPM, 50 mg at 08/02/25 1039    acetaminophen (Tylenol) tablet 650 mg, 650 mg, oral, q4h PRN, Maykel Hirsch DPM, 650 mg at 08/01/25 2005    albuterol 90 mcg/actuation inhaler 2 puff, 2 puff, inhalation, q4h PRN, Maykel Hirsch DPM    ARIPiprazole (Abilify) tablet 5 mg, 5 mg, oral, Daily, Maykel Hirsch DPM, 5 mg at 08/02/25 0900    atorvastatin (Lipitor) tablet 80 mg, 80 mg, oral, Nightly, Maykel Hirsch DPM, 80 mg at 08/01/25 2005    buPROPion SR (Wellbutrin SR) 12 hr tablet 150 mg, 150 mg, oral, BID, Maykel Hirsch DPM, 150 mg at  08/02/25 0900    cefTRIAXone (Rocephin) 2 g in dextrose (iso) IV 50 mL, 2 g, intravenous, q24h, Savita Marshall MD, Stopped at 08/02/25 1425    dextrose 50 % injection 12.5 g, 12.5 g, intravenous, q15 min PRN, Maykel Hirsch DPM    dextrose 50 % injection 25 g, 25 g, intravenous, q15 min PRN, Maykel Hirsch DPM    enoxaparin (Lovenox) syringe 40 mg, 40 mg, subcutaneous, Daily, Maykel Hirsch DPM, 40 mg at 08/02/25 1348    fluticasone furoate-vilanteroL (Breo Ellipta) 200-25 mcg/dose inhaler 1 puff, 1 puff, inhalation, Daily, Maykel Hirsch DPM, 1 puff at 08/01/25 1500    gabapentin (Neurontin) capsule 100 mg, 100 mg, oral, BID, Maykel Hirsch DPM, 100 mg at 08/02/25 0900    glucagon (Glucagen) injection 1 mg, 1 mg, intramuscular, q15 min PRN, Maykel Hirsch DPM    glucagon (Glucagen) injection 1 mg, 1 mg, intramuscular, q15 min PRN, Maykel Hirsch DPM    insulin lispro injection 0-5 Units, 0-5 Units, subcutaneous, TID AC, Maykel Hirsch DPM, 1 Units at 08/01/25 0835    losartan (Cozaar) tablet 25 mg, 25 mg, oral, Daily, Maykel Hirsch DPM, 25 mg at 08/02/25 0900    pantoprazole (ProtoNix) EC tablet 40 mg, 40 mg, oral, Daily, Maykel Hirsch DPM, 40 mg at 08/02/25 0900    potassium chloride CR (Klor-Con) ER tablet 10 mEq, 10 mEq, oral, Daily, Maykel Hirsch DPM, 10 mEq at 08/02/25 0900    sennosides-docusate sodium (Ashley-Colace) 8.6-50 mg per tablet 1 tablet, 1 tablet, oral, Nightly, Maykel Hirsch DPM, 1 tablet at 08/01/25 2005    sertraline (Zoloft) tablet 50 mg, 50 mg, oral, Daily, Maykel Hirsch DPM, 50 mg at 08/02/25 0900    spironolactone (Aldactone) tablet 25 mg, 25 mg, oral, Daily, Maykel Hirsch DPM, 25 mg at 08/02/25 0900    tiotropium (Spiriva Respimat) 2.5 mcg/actuation inhaler 2 puff, 2 puff, inhalation, Daily, Maykel Hirsch DPM, 2 puff at 08/01/25 1500    torsemide (Demadex) tablet 20 mg, 20 mg, oral, Daily, Maykel Hirsch DPM, 20 mg at 08/02/25 0900    vancomycin (Vancocin) pharmacy to dose - pharmacy monitoring, ,  miscellaneous, Daily PRN, Savita Marshall MD    vancomycin 1.5 g in D5W  mL, 1,500 mg, intravenous, q12h, Pam Mancini, PharmD

## 2025-08-02 NOTE — NURSING NOTE
Dr. Savita Marshall notified of sepsis alert, after pt was evaluated by her she stated that it was not a true sepsis alert. All orders d/cd.

## 2025-08-02 NOTE — ANESTHESIA POSTPROCEDURE EVALUATION
Patient: Morro Seo    Procedure Summary       Date: 08/02/25 Room / Location: [facility] OR 01 / Virtual [facility] OR    Anesthesia Start: 0852 Anesthesia Stop: 0946    Procedure: Partial amputation of the right 3rd toe (Right: Foot) Diagnosis:       Acute osteomyelitis of phalanx of right foot (Multi)      Diabetic ulcer of toe of right foot associated with type 2 diabetes mellitus, with necrosis of bone      (Acute osteomyelitis of phalanx of right foot (Multi) [M86.171])      (Diabetic ulcer of toe of right foot associated with type 2 diabetes mellitus, with necrosis of bone [E11.621, L97.514])    Surgeons: Maykel Hirsch DPM Responsible Provider: YOLANDE Durham    Anesthesia Type: MAC ASA Status: 3            Anesthesia Type: MAC    Vitals Value Taken Time   /64 08/02/25 09:50   Temp 36.6 °C (97.9 °F) 08/02/25 09:42   Pulse 0 08/02/25 09:54   Resp 23 08/02/25 09:52   SpO2 94 % 08/02/25 09:54   Vitals shown include unfiled device data.    Anesthesia Post Evaluation    Patient location during evaluation: PACU  Patient participation: complete - patient participated  Level of consciousness: awake  Pain score: 0  Pain management: adequate  Airway patency: patent  Cardiovascular status: acceptable  Respiratory status: acceptable  Hydration status: acceptable  Postoperative Nausea and Vomiting: none        There were no known notable events for this encounter.

## 2025-08-02 NOTE — CARE PLAN
Problem: Pain - Adult  Goal: Verbalizes/displays adequate comfort level or baseline comfort level  Outcome: Progressing     Problem: Safety - Adult  Goal: Free from fall injury  Outcome: Progressing     Problem: Discharge Planning  Goal: Discharge to home or other facility with appropriate resources  Outcome: Progressing     Problem: Chronic Conditions and Co-morbidities  Goal: Patient's chronic conditions and co-morbidity symptoms are monitored and maintained or improved  Outcome: Progressing     Problem: Nutrition  Goal: Nutrient intake appropriate for maintaining nutritional needs  Outcome: Progressing     Problem: Skin  Goal: Decreased wound size/increased tissue granulation at next dressing change  Outcome: Progressing  Goal: Participates in plan/prevention/treatment measures  Outcome: Progressing  Goal: Prevent/manage excess moisture  Outcome: Progressing  Goal: Prevent/minimize sheer/friction injuries  Outcome: Progressing  Goal: Promote/optimize nutrition  Outcome: Progressing  Flowsheets (Taken 8/1/2025 2135)  Promote/optimize nutrition: Monitor/record intake including meals  Goal: Promote skin healing  Outcome: Progressing     Problem: Infection - Adult  Goal: Absence of infection at discharge  Outcome: Progressing  Goal: Absence of infection during hospitalization  Outcome: Progressing     Problem: Diabetes  Goal: Achieve decreasing blood glucose levels by end of shift  Outcome: Progressing  Goal: Increase stability of blood glucose readings by end of shift  Outcome: Progressing  Goal: Decrease in ketones present in urine by end of shift  Outcome: Progressing  Goal: Maintain electrolyte levels within acceptable range throughout shift  Outcome: Progressing  Goal: Maintain glucose levels >70mg/dl to <250mg/dl throughout shift  Outcome: Progressing  Goal: No changes in neurological exam by end of shift  Outcome: Progressing  Goal: Learn about and adhere to nutrition recommendations by end of  shift  Outcome: Progressing  Goal: Vital signs within normal range for age by end of shift  Outcome: Progressing  Goal: Increase self care and/or family involovement by end of shift  Outcome: Progressing  Goal: Receive DSME education by end of shift  Outcome: Progressing     Problem: Pain  Goal: Takes deep breaths with improved pain control throughout the shift  Outcome: Progressing  Goal: Turns in bed with improved pain control throughout the shift  Outcome: Progressing  Goal: Walks with improved pain control throughout the shift  Outcome: Progressing  Goal: Performs ADL's with improved pain control throughout shift  Outcome: Progressing  Goal: Participates in PT with improved pain control throughout the shift  Outcome: Progressing  Goal: Free from opioid side effects throughout the shift  Outcome: Progressing  Goal: Free from acute confusion related to pain meds throughout the shift  Outcome: Progressing

## 2025-08-02 NOTE — ANESTHESIA PREPROCEDURE EVALUATION
Patient: Morro Seo    Procedure Information       Date/Time: 08/02/25 0815    Procedure: Partial amputation of the right 3rd toe, possible fully amputated (Right: Foot)    Location: [facility] OR 01 / Virtual [facility] OR    Surgeons: Maykel Hirsch DPM            Relevant Problems   Anesthesia (within normal limits)      Cardiac   (+) Essential hypertension   (+) Hyperlipidemia      Pulmonary   (+) COPD (chronic obstructive pulmonary disease) (Multi)   (+) Multiple pulmonary nodules      Neuro   (+) Anxiety state   (+) Depression   (+) Generalized anxiety disorder      GI   (+) Hematemesis      Endocrine   (+) Morbid obesity (Multi)   (+) Type 2 diabetes mellitus, without long-term current use of insulin (Multi)      Hematology   (+) Erythrocytosis      Musculoskeletal   (+) Osteoarthritis      HEENT   (+) Sensorineural hearing loss, asymmetrical      ID   (+) Acute osteomyelitis of phalanx of right foot (Multi)   (+) Other osteomyelitis of right foot       Clinical information reviewed:   Tobacco  Allergies  Meds  Problems  Med Hx  Surg Hx   Fam Hx          NPO Detail:  No data recorded     Physical Exam    Airway  Mallampati: II  TM distance: >3 FB  Neck ROM: full     Cardiovascular - normal exam   Dental    Pulmonary - normal exam   Abdominal - normal exam           Anesthesia Plan    History of general anesthesia?: yes  History of complications of general anesthesia?: no    ASA 4     MAC     The patient is not a current smoker.    intravenous induction   Anesthetic plan and risks discussed with patient.    Plan discussed with CRNA.

## 2025-08-02 NOTE — ASSESSMENT & PLAN NOTE
- Status post partial amputation of right third toe, day 0  - Started on IV ceftriaxone and vancomycin empirically for osteomyelitis  - Pain is well-controlled with Tylenol as needed  - Podiatry and infectious diseases following  -Awaited tissue/wound culture from 8/2/2025

## 2025-08-02 NOTE — PROGRESS NOTES
"Morro Seo is a 60 y.o. male on day 2 of admission presenting with Other osteomyelitis of right foot s/p partial amputation of right third toe.,  Day 0    Subjective   Patient seen and examined bedside.  Denies any fever/chills/nausea/vomiting.  Pain is well-controlled with Tylenol as needed.        Objective     Physical Exam  General-in no acute distress, obese  HEENT-NCAT  CVS-normal rate and rhythm  Chest-clear to auscultate bilaterally, no adventitious breath sounds  Abdomen-soft/nontender/nondistended  MSK-no tenderness, normal range of motion  Skin-wounds on right 2nd and 3rd toes, CDI  Psych-normal mood and affect    Last Recorded Vitals  Blood pressure 124/66, pulse 81, temperature 35.2 °C (95.3 °F), temperature source Temporal, resp. rate 18, height 1.778 m (5' 10\"), weight 130 kg (286 lb 13.1 oz), SpO2 94%.  Intake/Output last 3 Shifts:  I/O last 3 completed shifts:  In: 1760 (13.5 mL/kg) [P.O.:1160; IV Piggyback:600]  Out: - (0 mL/kg)   Weight: 130.1 kg     Results for orders placed or performed during the hospital encounter of 07/31/25 (from the past 24 hours)   POCT GLUCOSE   Result Value Ref Range    POCT Glucose 156 (H) 74 - 99 mg/dL   Tissue/Wound Culture/Smear    Specimen: Wound/Tissue; Tissue/Biopsy   Result Value Ref Range    Tissue/Wound Culture/Smear No growth to date     Gram Stain (1+) Rare Polymorphonuclear leukocytes (A)     Gram Stain (2+) Few Gram positive cocci (A)     Gram Stain (2+) Few Gram negative bacilli (A)    POCT GLUCOSE   Result Value Ref Range    POCT Glucose 144 (H) 74 - 99 mg/dL   POCT GLUCOSE   Result Value Ref Range    POCT Glucose 119 (H) 74 - 99 mg/dL   POCT GLUCOSE   Result Value Ref Range    POCT Glucose 130 (H) 74 - 99 mg/dL     *Note: Due to a large number of results and/or encounters for the requested time period, some results have not been displayed. A complete set of results can be found in Results Review.                         Assessment & Plan  Other " osteomyelitis of right foot  - Status post partial amputation of right third toe, day 0  - Started on IV ceftriaxone and vancomycin empirically for osteomyelitis  - Pain is well-controlled with Tylenol as needed  - Podiatry and infectious diseases following  -Awaited tissue/wound culture from 8/2/2025    Acute osteomyelitis of phalanx of right foot (Multi)  - Same as #1    Diabetic ulcer of toe of right foot associated with type 2 diabetes mellitus, with necrosis of bone  -Start insulin sliding scale with hypoglycemia order set  Pt no longer taking Farxiga, glipizide and metformin-[facility]  -[facility]    #HTN   BP is well controlled on home antihypertensive medications-losartan 25 mg daily, Aldactone 25 mg daily, torsemide 20 mg daily     #NAVYA   CPAP        Code Status: Full Code   DVT ppx: Crispin, Gato Marshall MD

## 2025-08-02 NOTE — OP NOTE
Partial amputation of the right 3rd toe (R) Operative Note     Date: 2025  OR Location: [facility]    Name: Morro Seo, : 1965, Age: 60 y.o., MRN: [id number], Sex: male    Diagnosis  Pre-op Diagnosis      * Acute osteomyelitis of phalanx of right foot (Multi) [M86.171]     * Diabetic ulcer of toe of right foot associated with type 2 diabetes mellitus, with necrosis of bone [E11.621, L97.514] Post-op Diagnosis     * Acute osteomyelitis of phalanx of right foot (Multi) [M86.171]     * Diabetic ulcer of toe of right foot associated with type 2 diabetes mellitus, with necrosis of bone [E11.621, L97.514]     Procedures  Partial amputation of the right 3rd toe  31312 - RI AMPUTATION TOE METATARSOPHALANGEAL JOINT      Surgeons      * Maykel Hirsch - Primary    Resident/Fellow/Other Assistant:  Surgeons and Role:  * No surgeons found with a matching role *    Staff:   Circulator: Felisha  Scrub Person: Sduhir  Circulator:   Scrub Person:     Anesthesia Staff: JOSE: CRUZITO Durham-CRNA    Procedure Summary  Anesthesia: Monitor Anesthesia Care  ASA: III  Estimated Blood Loss: 0mL  Intra-op Medications:   Administrations occurring from 0815 to 09 on 25:   Medication Name Total Dose   BUPivacaine HCl (Marcaine) 0.5 % (5 mg/mL) injection 10 mL   acarbose (Precose) tablet 50 mg Cannot be calculated   acetaminophen (Tylenol) tablet 650 mg Cannot be calculated   albuterol 90 mcg/actuation inhaler 2 puff Cannot be calculated   ARIPiprazole (Abilify) tablet 5 mg Cannot be calculated   atorvastatin (Lipitor) tablet 80 mg Cannot be calculated   buPROPion SR (Wellbutrin SR) 12 hr tablet 150 mg Cannot be calculated   dextrose 50 % injection 12.5 g Cannot be calculated   dextrose 50 % injection 25 g Cannot be calculated   enoxaparin (Lovenox) syringe 40 mg Cannot be calculated   fentaNYL (Sublimaze) injection 50 mcg/mL 100 mcg   fluticasone furoate-vilanteroL (Breo Ellipta) 200-25 mcg/dose inhaler 1 puff  Cannot be calculated   gabapentin (Neurontin) capsule 100 mg Cannot be calculated   glucagon (Glucagen) injection 1 mg Cannot be calculated   glucagon (Glucagen) injection 1 mg Cannot be calculated   insulin lispro injection 0-5 Units Cannot be calculated   LR bolus Cannot be calculated   losartan (Cozaar) tablet 25 mg Cannot be calculated   metoclopramide (Reglan) injection 10 mg   midazolam (Versed) injection 1 mg/mL 2 mg   ondansetron (Zofran) 2 mg/mL injection 4 mg   pantoprazole (ProtoNix) EC tablet 40 mg Cannot be calculated   potassium chloride CR (Klor-Con) ER tablet 10 mEq Cannot be calculated   propofol (Diprivan) injection 10 mg/mL 200 mg   sennosides-docusate sodium (Ashley-Colace) 8.6-50 mg per tablet 1 tablet Cannot be calculated   sertraline (Zoloft) tablet 50 mg Cannot be calculated   spironolactone (Aldactone) tablet 25 mg Cannot be calculated   tiotropium (Spiriva Respimat) 2.5 mcg/actuation inhaler 2 puff Cannot be calculated   torsemide (Demadex) tablet 20 mg Cannot be calculated              Anesthesia Record               Intraprocedure I/O Totals          Intake    LR bolus 100.00 mL    Total Intake 100 mL       Output    Est. Blood Loss 0 mL    Total Output 0 mL       Net    Net Volume 100 mL          Specimen: Soft tissue culture of surgical wound             Drains and/or Catheters: * None in log *    Tourniquet Times:     Total Tourniquet Time Documented:  Calf (Right) - 20 minutes  Total: Calf (Right) - 20 minutes      Implants:     Findings: necrotic ulcer on distal right 3rd toe. Pathologic fracture of the distal phalange, which was fully excised. Remaining bone appeared healthy, free of infection. Clear margins macroscopically.     Indications: Morro Seo is an 60 y.o. male who is having surgery for Acute osteomyelitis of phalanx of right foot (Multi) [M86.171]  Diabetic ulcer of toe of right foot associated with type 2 diabetes mellitus, with necrosis of bone [E11.621, L97.514].      The patient was seen in the preoperative area. The risks, benefits, complications, treatment options, non-operative alternatives, expected recovery and outcomes were discussed with the patient. The possibilities of reaction to medication, pulmonary aspiration, injury to surrounding structures, bleeding, recurrent infection, the need for additional procedures, failure to diagnose a condition, and creating a complication requiring transfusion or operation were discussed with the patient. The patient concurred with the proposed plan, giving informed consent.  The site of surgery was properly noted/marked if necessary per policy. The patient has been actively warmed in preoperative area. Preoperative antibiotics are not indicated. Venous thrombosis prophylaxis are not indicated.    Procedure Details: Patient brought to the OR and positioned supine in the hospital bed. Once IV sedation was achieved and the surgical time out was completed, local anesthetic was administered, pneumatic tourniquet was placed on the ankle and the foot was scrubbed, prepped and draped following standard aseptic protocol. Esmarch use to  exsanguinate the foot and the tourniquet inflated to hold hemostasis.    Right 3rd toe had a full thickness ulcer on the distal end with palpable bone of the distal phalange tuft. Incision was made through the ulcer, fully excising the wound and exposing the underlying phalange, which was noted to be fractured. No purulence or malodor encountered. Distal phalange fully excised. Site flushed thoroughly. No evident residual infection. Wound was closed with retention and simple sutures using prolene. Tourniquet deflated and there was prompt return of capillary perfusion to all toes. Dry sterile dressing applied.    Patient tolerated the procedure and the anesthesia well and was transferred to recovery with stable vitals and intact vascular status to the extremity. Patient was readmitted to inpatient care  following a period of post operative monitored recovery.    Evidence of Infection: Yes; necrosis and localized erythema.   Complications:  None; patient tolerated the procedure well.    Disposition: PACU - hemodynamically stable.  Condition: stable                 Additional Details:     Attending Attestation:     Maykel Hirsch  Phone Number: [phone]

## 2025-08-02 NOTE — ASSESSMENT & PLAN NOTE
-Start insulin sliding scale with hypoglycemia order set  Pt no longer taking Farxiga, glipizide and metformin-he is now on Trulicity  -wound care    #HTN   BP is well controlled on home antihypertensive medications-losartan 25 mg daily, Aldactone 25 mg daily, torsemide 20 mg daily     #NAVYA   CPAP        Code Status: Full Code   DVT ppx: SCDs, Lovenox

## 2025-08-02 NOTE — CARE PLAN
Problem: Pain - Adult  Goal: Verbalizes/displays adequate comfort level or baseline comfort level  8/2/2025 1935 by Tarah Mauricio RN  Outcome: Progressing  8/2/2025 1935 by Tarah Mauricio RN  Outcome: Progressing     Problem: Safety - Adult  Goal: Free from fall injury  8/2/2025 1935 by Tarah Mauricio RN  Outcome: Progressing  8/2/2025 1935 by Tarah Mauricio RN  Outcome: Progressing     Problem: Discharge Planning  Goal: Discharge to home or other facility with appropriate resources  8/2/2025 1935 by Tarah Mauricio RN  Outcome: Progressing  8/2/2025 1935 by Tarah Mauricio RN  Outcome: Progressing     Problem: Chronic Conditions and Co-morbidities  Goal: Patient's chronic conditions and co-morbidity symptoms are monitored and maintained or improved  8/2/2025 1935 by Tarah Mauricio RN  Outcome: Progressing  8/2/2025 1935 by Tarah Mauricio RN  Outcome: Progressing     Problem: Nutrition  Goal: Nutrient intake appropriate for maintaining nutritional needs  8/2/2025 1935 by Tarah Mauricio RN  Outcome: Progressing  8/2/2025 1935 by Tarah Mauricio RN  Outcome: Progressing     Problem: Skin  Goal: Decreased wound size/increased tissue granulation at next dressing change  8/2/2025 1935 by Tarah Mauricio RN  Outcome: Progressing  8/2/2025 1935 by Tarah Mauricio RN  Outcome: Progressing  Goal: Participates in plan/prevention/treatment measures  8/2/2025 1935 by Tarah Mauricio RN  Outcome: Progressing  8/2/2025 1935 by Tarah Mauricio RN  Outcome: Progressing  Goal: Prevent/manage excess moisture  8/2/2025 1935 by Tarah Mauricio RN  Outcome: Progressing  8/2/2025 1935 by Tarah Mauricio RN  Outcome: Progressing  Goal: Prevent/minimize sheer/friction injuries  8/2/2025 1935 by Tarah Mauricio RN  Outcome: Progressing  8/2/2025 1935 by Tarah Mauricio RN  Outcome: Progressing  Goal: Promote/optimize nutrition  8/2/2025 1935 by Tarah Mauricio RN  Outcome: Progressing  Flowsheets (Taken 8/2/2025 1935)  Promote/optimize nutrition: Monitor/record intake including  meals  8/2/2025 1935 by Tarah Mauricio RN  Outcome: Progressing  Flowsheets (Taken 8/2/2025 1935)  Promote/optimize nutrition: Monitor/record intake including meals  Goal: Promote skin healing  8/2/2025 1935 by Tarah Mauricio RN  Outcome: Progressing  8/2/2025 1935 by Tarah Mauricio RN  Outcome: Progressing     Problem: Infection - Adult  Goal: Absence of infection at discharge  8/2/2025 1935 by Tarah Mauricio RN  Outcome: Progressing  8/2/2025 1935 by Tarah Mauricio RN  Outcome: Progressing  Goal: Absence of infection during hospitalization  8/2/2025 1935 by Tarah Mauricio RN  Outcome: Progressing  8/2/2025 1935 by Tarah Mauricio RN  Outcome: Progressing     Problem: Diabetes  Goal: Achieve decreasing blood glucose levels by end of shift  8/2/2025 1935 by Tarah Mauricio RN  Outcome: Progressing  8/2/2025 1935 by Tarah Mauricio RN  Outcome: Progressing  Goal: Increase stability of blood glucose readings by end of shift  8/2/2025 1935 by Tarah Mauricio RN  Outcome: Progressing  8/2/2025 1935 by Tarah Mauricio RN  Outcome: Progressing  Goal: Decrease in ketones present in urine by end of shift  8/2/2025 1935 by Tarah Mauricio RN  Outcome: Progressing  8/2/2025 1935 by Tarah Mauricio RN  Outcome: Progressing  Goal: Maintain electrolyte levels within acceptable range throughout shift  8/2/2025 1935 by Tarah Mauricio RN  Outcome: Progressing  8/2/2025 1935 by Tarah Mauricio RN  Outcome: Progressing  Goal: Maintain glucose levels >70mg/dl to <250mg/dl throughout shift  8/2/2025 1935 by Tarah Mauricio RN  Outcome: Progressing  8/2/2025 1935 by Tarah Mauricio RN  Outcome: Progressing  Goal: No changes in neurological exam by end of shift  8/2/2025 1935 by Tarah Mauricio RN  Outcome: Progressing  8/2/2025 1935 by Tarah Mauricio RN  Outcome: Progressing  Goal: Learn about and adhere to nutrition recommendations by end of shift  8/2/2025 1935 by Tarah Mauricio RN  Outcome: Progressing  8/2/2025 1935 by Tarah Mauricio RN  Outcome: Progressing  Goal: Vital signs within  normal range for age by end of shift  8/2/2025 1935 by Tarah Mauricio RN  Outcome: Progressing  8/2/2025 1935 by Tarah Mauricio RN  Outcome: Progressing  Goal: Increase self care and/or family involovement by end of shift  8/2/2025 1935 by Tarah Mauricio RN  Outcome: Progressing  8/2/2025 1935 by Tarah Mauricio RN  Outcome: Progressing  Goal: Receive DSME education by end of shift  8/2/2025 1935 by Tarah Mauricio RN  Outcome: Progressing  8/2/2025 1935 by Tarah Mauricio RN  Outcome: Progressing     Problem: Pain  Goal: Takes deep breaths with improved pain control throughout the shift  8/2/2025 1935 by Tarah Mauricio RN  Outcome: Progressing  8/2/2025 1935 by Tarah Mauricio RN  Outcome: Progressing  Goal: Turns in bed with improved pain control throughout the shift  8/2/2025 1935 by Tarah Mauricio RN  Outcome: Progressing  8/2/2025 1935 by Tarah Mauricio RN  Outcome: Progressing  Goal: Walks with improved pain control throughout the shift  8/2/2025 1935 by Tarah Mauricio RN  Outcome: Progressing  8/2/2025 1935 by Tarah Mauricio RN  Outcome: Progressing  Goal: Performs ADL's with improved pain control throughout shift  8/2/2025 1935 by Tarah Mauricio RN  Outcome: Progressing  8/2/2025 1935 by Tarah Mauricio RN  Outcome: Progressing  Goal: Participates in PT with improved pain control throughout the shift  8/2/2025 1935 by Tarah Mauricio RN  Outcome: Progressing  8/2/2025 1935 by Tarah Mauricio RN  Outcome: Progressing  Goal: Free from opioid side effects throughout the shift  8/2/2025 1935 by Tarah Mauricio RN  Outcome: Progressing  8/2/2025 1935 by Tarah Mauricio RN  Outcome: Progressing  Goal: Free from acute confusion related to pain meds throughout the shift  8/2/2025 1935 by Tarah Mauricio RN  Outcome: Progressing  8/2/2025 1935 by Tarah Mauricio RN  Outcome: Progressing

## 2025-08-03 VITALS
RESPIRATION RATE: 18 BRPM | WEIGHT: 286.82 LBS | SYSTOLIC BLOOD PRESSURE: 100 MMHG | OXYGEN SATURATION: 95 % | HEIGHT: 70 IN | DIASTOLIC BLOOD PRESSURE: 61 MMHG | BODY MASS INDEX: 41.06 KG/M2 | HEART RATE: 74 BPM | TEMPERATURE: 97.1 F

## 2025-08-03 LAB
ALBUMIN SERPL BCP-MCNC: 4 G/DL (ref 3.4–5)
ALP SERPL-CCNC: 77 U/L (ref 33–136)
ALT SERPL W P-5'-P-CCNC: 20 U/L (ref 10–52)
ANION GAP SERPL CALC-SCNC: 10 MMOL/L (ref 10–20)
AST SERPL W P-5'-P-CCNC: 18 U/L (ref 9–39)
BACTERIA BLD CULT: NORMAL
BACTERIA BLD CULT: NORMAL
BACTERIA SPEC CULT: ABNORMAL
BACTERIA SPEC CULT: ABNORMAL
BASOPHILS # BLD AUTO: 0.05 X10*3/UL (ref 0–0.1)
BASOPHILS NFR BLD AUTO: 0.5 %
BILIRUB SERPL-MCNC: 0.8 MG/DL (ref 0–1.2)
BUN SERPL-MCNC: 26 MG/DL (ref 6–23)
CALCIUM SERPL-MCNC: 10 MG/DL (ref 8.6–10.3)
CHLORIDE SERPL-SCNC: 97 MMOL/L (ref 98–107)
CO2 SERPL-SCNC: 31 MMOL/L (ref 21–32)
CREAT SERPL-MCNC: 0.97 MG/DL (ref 0.5–1.3)
EGFRCR SERPLBLD CKD-EPI 2021: 89 ML/MIN/1.73M*2
EOSINOPHIL # BLD AUTO: 0.22 X10*3/UL (ref 0–0.7)
EOSINOPHIL NFR BLD AUTO: 2.2 %
ERYTHROCYTE [DISTWIDTH] IN BLOOD BY AUTOMATED COUNT: 13.9 % (ref 11.5–14.5)
GLUCOSE BLD MANUAL STRIP-MCNC: 162 MG/DL (ref 74–99)
GLUCOSE BLD MANUAL STRIP-MCNC: 186 MG/DL (ref 74–99)
GLUCOSE BLD MANUAL STRIP-MCNC: 194 MG/DL (ref 74–99)
GLUCOSE BLD MANUAL STRIP-MCNC: 228 MG/DL (ref 74–99)
GLUCOSE SERPL-MCNC: 199 MG/DL (ref 74–99)
GRAM STN SPEC: ABNORMAL
HCT VFR BLD AUTO: 43.6 % (ref 41–52)
HGB BLD-MCNC: 14.5 G/DL (ref 13.5–17.5)
IMM GRANULOCYTES # BLD AUTO: 0.05 X10*3/UL (ref 0–0.7)
IMM GRANULOCYTES NFR BLD AUTO: 0.5 % (ref 0–0.9)
LYMPHOCYTES # BLD AUTO: 1.32 X10*3/UL (ref 1.2–4.8)
LYMPHOCYTES NFR BLD AUTO: 13.1 %
MAGNESIUM SERPL-MCNC: 1.64 MG/DL (ref 1.6–2.4)
MCH RBC QN AUTO: 30.1 PG (ref 26–34)
MCHC RBC AUTO-ENTMCNC: 33.3 G/DL (ref 32–36)
MCV RBC AUTO: 91 FL (ref 80–100)
MONOCYTES # BLD AUTO: 0.94 X10*3/UL (ref 0.1–1)
MONOCYTES NFR BLD AUTO: 9.3 %
NEUTROPHILS # BLD AUTO: 7.48 X10*3/UL (ref 1.2–7.7)
NEUTROPHILS NFR BLD AUTO: 74.4 %
NRBC BLD-RTO: 0 /100 WBCS (ref 0–0)
PLATELET # BLD AUTO: 228 X10*3/UL (ref 150–450)
POTASSIUM SERPL-SCNC: 4.1 MMOL/L (ref 3.5–5.3)
PROT SERPL-MCNC: 6.8 G/DL (ref 6.4–8.2)
RBC # BLD AUTO: 4.81 X10*6/UL (ref 4.5–5.9)
SODIUM SERPL-SCNC: 134 MMOL/L (ref 136–145)
WBC # BLD AUTO: 10.1 X10*3/UL (ref 4.4–11.3)

## 2025-08-03 PROCEDURE — 83735 ASSAY OF MAGNESIUM: CPT

## 2025-08-03 PROCEDURE — 84075 ASSAY ALKALINE PHOSPHATASE: CPT

## 2025-08-03 PROCEDURE — 2500000004 HC RX 250 GENERAL PHARMACY W/ HCPCS (ALT 636 FOR OP/ED): Performed by: PODIATRIST

## 2025-08-03 PROCEDURE — 2500000004 HC RX 250 GENERAL PHARMACY W/ HCPCS (ALT 636 FOR OP/ED)

## 2025-08-03 PROCEDURE — 2500000004 HC RX 250 GENERAL PHARMACY W/ HCPCS (ALT 636 FOR OP/ED): Performed by: PHARMACIST

## 2025-08-03 PROCEDURE — 85025 COMPLETE CBC W/AUTO DIFF WBC: CPT

## 2025-08-03 PROCEDURE — 2500000002 HC RX 250 W HCPCS SELF ADMINISTERED DRUGS (ALT 637 FOR MEDICARE OP, ALT 636 FOR OP/ED): Performed by: PODIATRIST

## 2025-08-03 PROCEDURE — 1210000001 HC SEMI-PRIVATE ROOM DAILY

## 2025-08-03 PROCEDURE — 99233 SBSQ HOSP IP/OBS HIGH 50: CPT

## 2025-08-03 PROCEDURE — 2500000001 HC RX 250 WO HCPCS SELF ADMINISTERED DRUGS (ALT 637 FOR MEDICARE OP): Performed by: PODIATRIST

## 2025-08-03 PROCEDURE — 82947 ASSAY GLUCOSE BLOOD QUANT: CPT

## 2025-08-03 PROCEDURE — 36415 COLL VENOUS BLD VENIPUNCTURE: CPT

## 2025-08-03 RX ADMIN — BUPROPION HYDROCHLORIDE 150 MG: 150 TABLET, FILM COATED, EXTENDED RELEASE ORAL at 21:37

## 2025-08-03 RX ADMIN — VANCOMYCIN HYDROCHLORIDE 1.5 G: 1.5 INJECTION, SOLUTION INTRAVITREAL at 14:00

## 2025-08-03 RX ADMIN — BUPROPION HYDROCHLORIDE 150 MG: 150 TABLET, FILM COATED, EXTENDED RELEASE ORAL at 08:33

## 2025-08-03 RX ADMIN — PANTOPRAZOLE SODIUM 40 MG: 40 TABLET, DELAYED RELEASE ORAL at 08:33

## 2025-08-03 RX ADMIN — SPIRONOLACTONE 25 MG: 25 TABLET ORAL at 08:33

## 2025-08-03 RX ADMIN — ACETAMINOPHEN 650 MG: 325 TABLET ORAL at 21:37

## 2025-08-03 RX ADMIN — ACETAMINOPHEN 650 MG: 325 TABLET ORAL at 08:38

## 2025-08-03 RX ADMIN — VANCOMYCIN HYDROCHLORIDE 1.5 G: 1.5 INJECTION, SOLUTION INTRAVITREAL at 01:36

## 2025-08-03 RX ADMIN — ACARBOSE 50 MG: 25 TABLET ORAL at 12:16

## 2025-08-03 RX ADMIN — ACARBOSE 50 MG: 25 TABLET ORAL at 17:26

## 2025-08-03 RX ADMIN — ACETAMINOPHEN 650 MG: 325 TABLET ORAL at 12:51

## 2025-08-03 RX ADMIN — TORSEMIDE 20 MG: 20 TABLET ORAL at 08:33

## 2025-08-03 RX ADMIN — ARIPIPRAZOLE 5 MG: 5 TABLET ORAL at 08:33

## 2025-08-03 RX ADMIN — LOSARTAN POTASSIUM 25 MG: 25 TABLET, FILM COATED ORAL at 08:33

## 2025-08-03 RX ADMIN — ATORVASTATIN CALCIUM 80 MG: 40 TABLET, FILM COATED ORAL at 21:37

## 2025-08-03 RX ADMIN — CEFTRIAXONE 2 G: 2 INJECTION, SOLUTION INTRAVENOUS at 12:17

## 2025-08-03 RX ADMIN — GABAPENTIN 100 MG: 100 CAPSULE ORAL at 21:37

## 2025-08-03 RX ADMIN — GABAPENTIN 100 MG: 100 CAPSULE ORAL at 08:33

## 2025-08-03 RX ADMIN — FLUTICASONE FUROATE AND VILANTEROL TRIFENATATE 1 PUFF: 200; 25 POWDER RESPIRATORY (INHALATION) at 17:26

## 2025-08-03 RX ADMIN — ENOXAPARIN SODIUM 40 MG: 100 INJECTION SUBCUTANEOUS at 14:42

## 2025-08-03 RX ADMIN — ACARBOSE 50 MG: 25 TABLET ORAL at 08:33

## 2025-08-03 RX ADMIN — DOCUSATE SODIUM 50MG AND SENNOSIDES 8.6MG 1 TABLET: 8.6; 5 TABLET, FILM COATED ORAL at 21:37

## 2025-08-03 RX ADMIN — POTASSIUM CHLORIDE 10 MEQ: 750 TABLET, EXTENDED RELEASE ORAL at 08:33

## 2025-08-03 RX ADMIN — TIOTROPIUM BROMIDE INHALATION SPRAY 2 PUFF: 3.12 SPRAY, METERED RESPIRATORY (INHALATION) at 17:26

## 2025-08-03 RX ADMIN — SERTRALINE HYDROCHLORIDE 50 MG: 50 TABLET ORAL at 08:33

## 2025-08-03 ASSESSMENT — COGNITIVE AND FUNCTIONAL STATUS - GENERAL
MOBILITY SCORE: 24
DAILY ACTIVITIY SCORE: 24
MOBILITY SCORE: 24
DAILY ACTIVITIY SCORE: 24

## 2025-08-03 ASSESSMENT — PAIN SCALES - GENERAL: PAINLEVEL_OUTOF10: 3

## 2025-08-03 ASSESSMENT — PAIN - FUNCTIONAL ASSESSMENT: PAIN_FUNCTIONAL_ASSESSMENT: 0-10

## 2025-08-03 NOTE — PROGRESS NOTES
"Morro Seo is a 60 y.o. male on day 3 of admission presenting with Other osteomyelitis of right foot s/p right third toe amputation.    Subjective   Patient sitting upright in chair.Denies fever/chills/nausea/vomiting. Pain under control with tylenelol as needed.       Objective     Physical Exam  General: alert, oriented, NAD  Lungs: bilaterally clear to auscultation  Heart: regular rate and rhythm  Abdomen: soft, non tender, non distended, BS+  Extremities: Right foot with postoperative dressing  No rashes      Last Recorded Vitals  Blood pressure 130/74, pulse 83, temperature 36.1 °C (96.9 °F), temperature source Temporal, resp. rate 18, height 1.778 m (5' 10\"), weight 130 kg (286 lb 13.1 oz), SpO2 93%.  Intake/Output last 3 Shifts:  I/O last 3 completed shifts:  In: 1272 (9.8 mL/kg) [P.O.:872; IV Piggyback:400]  Out: 125 (1 mL/kg) [Urine:125 (0 mL/kg/hr)]  Weight: 130.1 kg     Results for orders placed or performed during the hospital encounter of 07/31/25 (from the past 24 hours)   POCT GLUCOSE   Result Value Ref Range    POCT Glucose 130 (H) 74 - 99 mg/dL   POCT GLUCOSE   Result Value Ref Range    POCT Glucose 139 (H) 74 - 99 mg/dL   POCT GLUCOSE   Result Value Ref Range    POCT Glucose 175 (H) 74 - 99 mg/dL   CBC and Auto Differential   Result Value Ref Range    WBC 10.1 4.4 - 11.3 x10*3/uL    nRBC 0.0 0.0 - 0.0 /100 WBCs    RBC 4.81 4.50 - 5.90 x10*6/uL    Hemoglobin 14.5 13.5 - 17.5 g/dL    Hematocrit 43.6 41.0 - 52.0 %    MCV 91 80 - 100 fL    MCH 30.1 26.0 - 34.0 pg    MCHC 33.3 32.0 - 36.0 g/dL    RDW 13.9 11.5 - 14.5 %    Platelets 228 150 - 450 x10*3/uL    Neutrophils % 74.4 40.0 - 80.0 %    Immature Granulocytes %, Automated 0.5 0.0 - 0.9 %    Lymphocytes % 13.1 13.0 - 44.0 %    Monocytes % 9.3 2.0 - 10.0 %    Eosinophils % 2.2 0.0 - 6.0 %    Basophils % 0.5 0.0 - 2.0 %    Neutrophils Absolute 7.48 1.20 - 7.70 x10*3/uL    Immature Granulocytes Absolute, Automated 0.05 0.00 - 0.70 x10*3/uL    " Lymphocytes Absolute 1.32 1.20 - 4.80 x10*3/uL    Monocytes Absolute 0.94 0.10 - 1.00 x10*3/uL    Eosinophils Absolute 0.22 0.00 - 0.70 x10*3/uL    Basophils Absolute 0.05 0.00 - 0.10 x10*3/uL   Comprehensive Metabolic Panel   Result Value Ref Range    Glucose 199 (H) 74 - 99 mg/dL    Sodium 134 (L) 136 - 145 mmol/L    Potassium 4.1 3.5 - 5.3 mmol/L    Chloride 97 (L) 98 - 107 mmol/L    Bicarbonate 31 21 - 32 mmol/L    Anion Gap 10 10 - 20 mmol/L    Urea Nitrogen 26 (H) 6 - 23 mg/dL    Creatinine 0.97 0.50 - 1.30 mg/dL    eGFR 89 >60 mL/min/1.73m*2    Calcium 10.0 8.6 - 10.3 mg/dL    Albumin 4.0 3.4 - 5.0 g/dL    Alkaline Phosphatase 77 33 - 136 U/L    Total Protein 6.8 6.4 - 8.2 g/dL    AST 18 9 - 39 U/L    Bilirubin, Total 0.8 0.0 - 1.2 mg/dL    ALT 20 10 - 52 U/L   Magnesium   Result Value Ref Range    Magnesium 1.64 1.60 - 2.40 mg/dL   POCT GLUCOSE   Result Value Ref Range    POCT Glucose 186 (H) 74 - 99 mg/dL     *Note: Due to a large number of results and/or encounters for the requested time period, some results have not been displayed. A complete set of results can be found in Results Review.                         Assessment & Plan  Acute osteomyelitis of phalanx of right foot (Multi)  - Status post partial amputation of right third toe, day 1  - Started on IV ceftriaxone and vancomycin empirically for osteomyelitis- day 2  - Pain is well-controlled with Tylenol as needed  - Podiatry and infectious diseases following  -Awaited tissue/wound culture from 8/2/2025-- preliminary reviewed    Diabetic ulcer of toe of right foot associated with type 2 diabetes mellitus, with necrosis of bone  - insulin sliding scale   -Pt no longer taking Farxiga, glipizide and metformin-he is now on Trulicity  -wound care    #HTN   -losartan 25 mg daily, Aldactone 25 mg daily, torsemide 20 mg daily     #NAVYA   CPAP      #T2DM  -A1c 7.3 (06/17/25)     Code Status: Full Code   DVT ppx: SCDs, Lovenox         Savita Marshall MD

## 2025-08-03 NOTE — ASSESSMENT & PLAN NOTE
- insulin sliding scale   -Pt no longer taking Farxiga, glipizide and [facility]  -[facility]    #HTN   -losartan 25 mg daily, Aldactone 25 mg daily, torsemide 20 mg daily     #NAVYA   CPAP      #T2DM  -A1c 7.3 (06/17/25)     Code Status: Full Code   DVT ppx: SCDs, Lovenox

## 2025-08-03 NOTE — ASSESSMENT & PLAN NOTE
- Status post partial amputation of right third toe, day 1  - Started on IV ceftriaxone and vancomycin empirically for osteomyelitis- day 2  - Pain is well-controlled with Tylenol as needed  - Podiatry and infectious diseases following  -Awaited tissue/wound culture from 8/2/2025-- preliminary reviewed

## 2025-08-03 NOTE — PROGRESS NOTES
"[facility] INFECTIOUS DISEASE PROGRESS NOTE    Patient Name: Morro Seo  MRN: [id number]    INTERVAL HISTORY:   No fevers or chills.  Seen by podiatry and underwent right third toe amputation.  Foot pain present    Problem List[1]     ASSESSMENT:   #Right third digit edema  #Right second toe osteomyelitis  The pt has a complicated podiatric hx including hallux valgus and rigidus of both feet and pes valgus and planus of the right foot s/p R decompression osteotomy and bunionectomy w/implant placement, distal phalanx resection and MPJ capsulotomy of the R 1st digit in   He underwent removal of the hardware and I+D in  due to cellulitis and symptomatic internal fixation displaced/dislodged right foot   He follows with his podiatrist Dr. Pavan Powers  Patient recently finished 10 days of antibiotics.  ESR, CRP, WBC count normal.  Monitoring off antibiotics to maximize yield of the cultures       #DM: A1c 7.3 (25)     COPD  HTN  NAVYA        Recommendations:  Postoperative day 1, partial amputation of the right third toe at metatarsophalangeal joint  -continue vancomycin  Continue ceftriaxone  Check vancomycin trough prior to the fourth dose  Obtain operative cultures  If proximal cultures remain negative, may not need prolonged IV antibiotic course as amputation has been performed with hopefully removal of all infected bone    MEDICATIONS: reviewed.  Current Medications[2]     PHYSICAL EXAM:  Vital signs: /79 (BP Location: Left arm, Patient Position: Lying)   Pulse 86   Temp 35.9 °C (96.7 °F) (Temporal)   Resp 18   Ht 1.778 m (5' 10\")   Wt 130 kg (286 lb 13.1 oz)   SpO2 96%   BMI 41.15 kg/m²   Temp (24hrs), Av.2 °C (97.1 °F), Min:35.9 °C (96.7 °F), Max:36.5 °C (97.7 °F)    General: alert, oriented, NAD  Lungs: bilaterally clear to auscultation  Heart: regular rate and rhythm  Abdomen: soft, non tender, non distended, BS+  Extremities: Right foot with postoperative dressing  No rashes  No " joint inflammation  Neck supple  Lines ok  No CVAT              Labs:    Results for orders placed or performed during the hospital encounter of 07/31/25 (from the past 96 hours)   CBC and Auto Differential   Result Value Ref Range    WBC 9.3 4.4 - 11.3 x10*3/uL    nRBC 0.0 0.0 - 0.0 /100 WBCs    RBC 4.92 4.50 - 5.90 x10*6/uL    Hemoglobin 15.0 13.5 - 17.5 g/dL    Hematocrit 44.4 41.0 - 52.0 %    MCV 90 80 - 100 fL    MCH 30.5 26.0 - 34.0 pg    MCHC 33.8 32.0 - 36.0 g/dL    RDW 14.3 11.5 - 14.5 %    Platelets 250 150 - 450 x10*3/uL    Neutrophils % 81.2 40.0 - 80.0 %    Immature Granulocytes %, Automated 0.4 0.0 - 0.9 %    Lymphocytes % 11.7 13.0 - 44.0 %    Monocytes % 5.4 2.0 - 10.0 %    Eosinophils % 1.0 0.0 - 6.0 %    Basophils % 0.3 0.0 - 2.0 %    Neutrophils Absolute 7.59 1.20 - 7.70 x10*3/uL    Immature Granulocytes Absolute, Automated 0.04 0.00 - 0.70 x10*3/uL    Lymphocytes Absolute 1.09 (L) 1.20 - 4.80 x10*3/uL    Monocytes Absolute 0.50 0.10 - 1.00 x10*3/uL    Eosinophils Absolute 0.09 0.00 - 0.70 x10*3/uL    Basophils Absolute 0.03 0.00 - 0.10 x10*3/uL   Comprehensive metabolic panel   Result Value Ref Range    Glucose 81 74 - 99 mg/dL    Sodium 139 136 - 145 mmol/L    Potassium 4.1 3.5 - 5.3 mmol/L    Chloride 101 98 - 107 mmol/L    Bicarbonate 31 21 - 32 mmol/L    Anion Gap 11 10 - 20 mmol/L    Urea Nitrogen 11 6 - 23 mg/dL    Creatinine 0.85 0.50 - 1.30 mg/dL    eGFR >90 >60 mL/min/1.73m*2    Calcium 10.1 8.6 - 10.3 mg/dL    Albumin 4.5 3.4 - 5.0 g/dL    Alkaline Phosphatase 83 33 - 136 U/L    Total Protein 7.6 6.4 - 8.2 g/dL    AST 20 9 - 39 U/L    Bilirubin, Total 1.0 0.0 - 1.2 mg/dL    ALT 23 10 - 52 U/L   Sedimentation rate, automated   Result Value Ref Range    Sedimentation Rate 19 0 - 20 mm/h   C-reactive protein   Result Value Ref Range    C-Reactive Protein 0.91 <1.00 mg/dL   Blood Culture    Specimen: Peripheral Venipuncture; Blood culture   Result Value Ref Range    Blood Culture No growth  at 2 days    Blood Culture    Specimen: Peripheral Venipuncture; Blood culture   Result Value Ref Range    Blood Culture No growth at 2 days    POCT GLUCOSE   Result Value Ref Range    POCT Glucose 184 (H) 74 - 99 mg/dL   POCT GLUCOSE   Result Value Ref Range    POCT Glucose 160 (H) 74 - 99 mg/dL   Basic Metabolic Panel   Result Value Ref Range    Glucose 188 (H) 74 - 99 mg/dL    Sodium 138 136 - 145 mmol/L    Potassium 4.0 3.5 - 5.3 mmol/L    Chloride 104 98 - 107 mmol/L    Bicarbonate 30 21 - 32 mmol/L    Anion Gap 8 (L) 10 - 20 mmol/L    Urea Nitrogen 10 6 - 23 mg/dL    Creatinine 0.92 0.50 - 1.30 mg/dL    eGFR >90 >60 mL/min/1.73m*2    Calcium 9.3 8.6 - 10.3 mg/dL   CBC   Result Value Ref Range    WBC 8.6 4.4 - 11.3 x10*3/uL    nRBC 0.0 0.0 - 0.0 /100 WBCs    RBC 4.55 4.50 - 5.90 x10*6/uL    Hemoglobin 13.4 (L) 13.5 - 17.5 g/dL    Hematocrit 41.6 41.0 - 52.0 %    MCV 91 80 - 100 fL    MCH 29.5 26.0 - 34.0 pg    MCHC 32.2 32.0 - 36.0 g/dL    RDW 14.3 11.5 - 14.5 %    Platelets 235 150 - 450 x10*3/uL   POCT GLUCOSE   Result Value Ref Range    POCT Glucose 166 (H) 74 - 99 mg/dL   POCT GLUCOSE   Result Value Ref Range    POCT Glucose 178 (H) 74 - 99 mg/dL   POCT GLUCOSE   Result Value Ref Range    POCT Glucose 156 (H) 74 - 99 mg/dL   Tissue/Wound Culture/Smear    Specimen: Wound/Tissue; Tissue/Biopsy   Result Value Ref Range    Tissue/Wound Culture/Smear       (1+) Rare Mixed Gram-Positive and Gram-Negative Bacteria    Gram Stain (1+) Rare Polymorphonuclear leukocytes (A)     Gram Stain (2+) Few Gram positive cocci (A)     Gram Stain (2+) Few Gram negative bacilli (A)    POCT GLUCOSE   Result Value Ref Range    POCT Glucose 144 (H) 74 - 99 mg/dL   POCT GLUCOSE   Result Value Ref Range    POCT Glucose 119 (H) 74 - 99 mg/dL   Tissue/Wound Culture/Smear    Specimen: Wound/Tissue; Tissue/Biopsy   Result Value Ref Range    Tissue/Wound Culture/Smear Culture in progress     Gram Stain No polymorphonuclear leukocytes seen  (A)     Gram Stain (A)      (4+) Abundant Gram positive cocci, pairs and chains   POCT GLUCOSE   Result Value Ref Range    POCT Glucose 130 (H) 74 - 99 mg/dL   POCT GLUCOSE   Result Value Ref Range    POCT Glucose 139 (H) 74 - 99 mg/dL   POCT GLUCOSE   Result Value Ref Range    POCT Glucose 175 (H) 74 - 99 mg/dL   CBC and Auto Differential   Result Value Ref Range    WBC 10.1 4.4 - 11.3 x10*3/uL    nRBC 0.0 0.0 - 0.0 /100 WBCs    RBC 4.81 4.50 - 5.90 x10*6/uL    Hemoglobin 14.5 13.5 - 17.5 g/dL    Hematocrit 43.6 41.0 - 52.0 %    MCV 91 80 - 100 fL    MCH 30.1 26.0 - 34.0 pg    MCHC 33.3 32.0 - 36.0 g/dL    RDW 13.9 11.5 - 14.5 %    Platelets 228 150 - 450 x10*3/uL    Neutrophils % 74.4 40.0 - 80.0 %    Immature Granulocytes %, Automated 0.5 0.0 - 0.9 %    Lymphocytes % 13.1 13.0 - 44.0 %    Monocytes % 9.3 2.0 - 10.0 %    Eosinophils % 2.2 0.0 - 6.0 %    Basophils % 0.5 0.0 - 2.0 %    Neutrophils Absolute 7.48 1.20 - 7.70 x10*3/uL    Immature Granulocytes Absolute, Automated 0.05 0.00 - 0.70 x10*3/uL    Lymphocytes Absolute 1.32 1.20 - 4.80 x10*3/uL    Monocytes Absolute 0.94 0.10 - 1.00 x10*3/uL    Eosinophils Absolute 0.22 0.00 - 0.70 x10*3/uL    Basophils Absolute 0.05 0.00 - 0.10 x10*3/uL   Comprehensive Metabolic Panel   Result Value Ref Range    Glucose 199 (H) 74 - 99 mg/dL    Sodium 134 (L) 136 - 145 mmol/L    Potassium 4.1 3.5 - 5.3 mmol/L    Chloride 97 (L) 98 - 107 mmol/L    Bicarbonate 31 21 - 32 mmol/L    Anion Gap 10 10 - 20 mmol/L    Urea Nitrogen 26 (H) 6 - 23 mg/dL    Creatinine 0.97 0.50 - 1.30 mg/dL    eGFR 89 >60 mL/min/1.73m*2    Calcium 10.0 8.6 - 10.3 mg/dL    Albumin 4.0 3.4 - 5.0 g/dL    Alkaline Phosphatase 77 33 - 136 U/L    Total Protein 6.8 6.4 - 8.2 g/dL    AST 18 9 - 39 U/L    Bilirubin, Total 0.8 0.0 - 1.2 mg/dL    ALT 20 10 - 52 U/L   Magnesium   Result Value Ref Range    Magnesium 1.64 1.60 - 2.40 mg/dL   POCT GLUCOSE   Result Value Ref Range    POCT Glucose 186 (H) 74 - 99 mg/dL    POCT GLUCOSE   Result Value Ref Range    POCT Glucose 194 (H) 74 - 99 mg/dL   POCT GLUCOSE   Result Value Ref Range    POCT Glucose 162 (H) 74 - 99 mg/dL     *Note: Due to a large number of results and/or encounters for the requested time period, some results have not been displayed. A complete set of results can be found in Results Review.      Microbiology data: reviewed    Imaging data: reviewed      Nicci Sky CNP  Pager:[phone]  Date of service: 8/3/2025  Time of service: 6:00 PM        I personally saw and evaluated the patient. I reviewed the NP Hx, exam and MDM and I agree with the NP assessment and plan unless otherwise addended above.     Caleb Obrien MD  [phone]  8/3/2025  2000       [1]   Patient Active Problem List  Diagnosis    Hyperlipidemia    Allergic rhinitis    Anxiety state    Benign paroxysmal positional vertigo    Type 2 diabetes mellitus, without long-term current use of insulin (Multi)    COPD (chronic obstructive pulmonary disease) (Multi)    Depression    Essential hypertension    Left ventricular hypertrophy    Leg swelling    Morbid obesity (Multi)    Multiple pulmonary nodules    Nocturia    Obstructive sleep apnea    Osteoarthritis    Other peripheral vertigo, unspecified ear    Sensorineural hearing loss, asymmetrical    Status cardiac pacemaker    Vertigo    Wheezing on auscultation    Tiredness    Chronic diastolic (congestive) heart failure    Erythrocytosis    Cervicalgia    Bilateral shoulder pain    Poor posture    Hematemesis    Agatston coronary artery calcium score greater than 400    Abnormal dobutamine stress echocardiography    Generalized anxiety disorder    Other osteomyelitis of right foot    Acute osteomyelitis of phalanx of right foot (Multi)    Diabetic ulcer of toe of right foot associated with type 2 diabetes mellitus, with necrosis of bone   [2]   Current Facility-Administered Medications:     acarbose (Precose) tablet 50 mg, 50 mg, oral, TID,  Maykel Hirsch DPM, 50 mg at 08/03/25 1726    acetaminophen (Tylenol) tablet 650 mg, 650 mg, oral, q4h PRN, Maykel Hirsch DPM, 650 mg at 08/03/25 1251    albuterol 90 mcg/actuation inhaler 2 puff, 2 puff, inhalation, q4h PRN, Maykel Hirsch DPM    ARIPiprazole (Abilify) tablet 5 mg, 5 mg, oral, Daily, Maykel Hirsch DPM, 5 mg at 08/03/25 0833    atorvastatin (Lipitor) tablet 80 mg, 80 mg, oral, Nightly, Maykel Hirsch DPM, 80 mg at 08/02/25 1958    buPROPion SR (Wellbutrin SR) 12 hr tablet 150 mg, 150 mg, oral, BID, Maykel Hirsch DPM, 150 mg at 08/03/25 0833    cefTRIAXone (Rocephin) 2 g in dextrose (iso) IV 50 mL, 2 g, intravenous, q24h, Savita Marshall MD, Stopped at 08/03/25 1251    dextrose 50 % injection 12.5 g, 12.5 g, intravenous, q15 min PRN, Maykel Hirsch DPM    dextrose 50 % injection 25 g, 25 g, intravenous, q15 min PRN, Maykel Hirsch DPM    enoxaparin (Lovenox) syringe 40 mg, 40 mg, subcutaneous, Daily, Maykel Hirsch DPM, 40 mg at 08/03/25 1442    fluticasone furoate-vilanteroL (Breo Ellipta) 200-25 mcg/dose inhaler 1 puff, 1 puff, inhalation, Daily, Maykel Hirsch DPM, 1 puff at 08/03/25 1726    gabapentin (Neurontin) capsule 100 mg, 100 mg, oral, BID, Maykel Hirsch DPM, 100 mg at 08/03/25 0833    glucagon (Glucagen) injection 1 mg, 1 mg, intramuscular, q15 min PRN, Maykel Hirsch DPM    glucagon (Glucagen) injection 1 mg, 1 mg, intramuscular, q15 min PRN, Maykel Hirsch DPM    insulin lispro injection 0-5 Units, 0-5 Units, subcutaneous, TID AC, Maykel Hirsch DPM, 1 Units at 08/01/25 0835    losartan (Cozaar) tablet 25 mg, 25 mg, oral, Daily, Maykel Hirsch DPM, 25 mg at 08/03/25 0833    pantoprazole (ProtoNix) EC tablet 40 mg, 40 mg, oral, Daily, Maykel Hirsch DPM, 40 mg at 08/03/25 0833    potassium chloride CR (Klor-Con) ER tablet 10 mEq, 10 mEq, oral, Daily, Maykel Hirsch DPM, 10 mEq at 08/03/25 0833    sennosides-docusate sodium (Ashley-Colace) 8.6-50 mg per tablet 1 tablet, 1 tablet, oral, Nightly, Maykel  WILI Hirsch, 1 tablet at 08/01/25 2005    sertraline (Zoloft) tablet 50 mg, 50 mg, oral, Daily, Maykel Hirsch DPM, 50 mg at 08/03/25 0833    spironolactone (Aldactone) tablet 25 mg, 25 mg, oral, Daily, Maykel Hirsch DPM, 25 mg at 08/03/25 0833    tiotropium (Spiriva Respimat) 2.5 mcg/actuation inhaler 2 puff, 2 puff, inhalation, Daily, Maykel Hirsch DPM, 2 puff at 08/03/25 1726    torsemide (Demadex) tablet 20 mg, 20 mg, oral, Daily, Maykel Hirsch DPM, 20 mg at 08/03/25 0833    vancomycin (Vancocin) pharmacy to dose - pharmacy monitoring, , miscellaneous, Daily PRN, Savita Marshall MD    vancomycin 1.5 g in D5W  mL, 1,500 mg, intravenous, q12h, Pam Mancini, PharmD, Stopped at 08/03/25 0639

## 2025-08-03 NOTE — CARE PLAN
Problem: Pain - Adult  Goal: Verbalizes/displays adequate comfort level or baseline comfort level  Outcome: Progressing     Problem: Safety - Adult  Goal: Free from fall injury  Outcome: Progressing     Problem: Discharge Planning  Goal: Discharge to home or other facility with appropriate resources  Outcome: Progressing     Problem: Chronic Conditions and Co-morbidities  Goal: Patient's chronic conditions and co-morbidity symptoms are monitored and maintained or improved  Outcome: Progressing     Problem: Nutrition  Goal: Nutrient intake appropriate for maintaining nutritional needs  Outcome: Progressing     Problem: Skin  Goal: Decreased wound size/increased tissue granulation at next dressing change  Outcome: Progressing  Goal: Participates in plan/prevention/treatment measures  Outcome: Progressing  Goal: Prevent/manage excess moisture  Outcome: Progressing  Goal: Prevent/minimize sheer/friction injuries  Outcome: Progressing  Goal: Promote/optimize nutrition  Outcome: Progressing  Goal: Promote skin healing  Outcome: Progressing     Problem: Infection - Adult  Goal: Absence of infection at discharge  Outcome: Progressing  Goal: Absence of infection during hospitalization  Outcome: Progressing     Problem: Diabetes  Goal: Achieve decreasing blood glucose levels by end of shift  Outcome: Progressing  Goal: Increase stability of blood glucose readings by end of shift  Outcome: Progressing  Goal: Decrease in ketones present in urine by end of shift  Outcome: Progressing  Goal: Maintain electrolyte levels within acceptable range throughout shift  Outcome: Progressing  Goal: Maintain glucose levels >70mg/dl to <250mg/dl throughout shift  Outcome: Progressing  Goal: No changes in neurological exam by end of shift  Outcome: Progressing  Goal: Learn about and adhere to nutrition recommendations by end of shift  Outcome: Progressing  Goal: Vital signs within normal range for age by end of shift  Outcome:  Progressing  Goal: Increase self care and/or family involovement by end of shift  Outcome: Progressing  Goal: Receive DSME education by end of shift  Outcome: Progressing     Problem: Pain  Goal: Takes deep breaths with improved pain control throughout the shift  Outcome: Progressing  Goal: Turns in bed with improved pain control throughout the shift  Outcome: Progressing  Goal: Walks with improved pain control throughout the shift  Outcome: Progressing  Goal: Performs ADL's with improved pain control throughout shift  Outcome: Progressing  Goal: Participates in PT with improved pain control throughout the shift  Outcome: Progressing  Goal: Free from opioid side effects throughout the shift  Outcome: Progressing  Goal: Free from acute confusion related to pain meds throughout the shift  Outcome: Progressing   The patient's goals for the shift include      The clinical goals for the shift include Patient will remain free from falls and injuries throughout the shift.

## 2025-08-04 ENCOUNTER — PHARMACY VISIT (OUTPATIENT)
Dept: PHARMACY | Facility: CLINIC | Age: 60
End: 2025-08-04
Payer: COMMERCIAL

## 2025-08-04 VITALS
OXYGEN SATURATION: 93 % | DIASTOLIC BLOOD PRESSURE: 71 MMHG | HEART RATE: 80 BPM | WEIGHT: 286.82 LBS | RESPIRATION RATE: 17 BRPM | BODY MASS INDEX: 41.06 KG/M2 | SYSTOLIC BLOOD PRESSURE: 124 MMHG | TEMPERATURE: 97.8 F | HEIGHT: 70 IN

## 2025-08-04 LAB
ALBUMIN SERPL BCP-MCNC: 4.3 G/DL (ref 3.4–5)
ALP SERPL-CCNC: 76 U/L (ref 33–136)
ALT SERPL W P-5'-P-CCNC: 25 U/L (ref 10–52)
ANION GAP SERPL CALC-SCNC: 10 MMOL/L (ref 10–20)
AST SERPL W P-5'-P-CCNC: 21 U/L (ref 9–39)
B-LACTAMASE ORGANISM ISLT: POSITIVE
BACTERIA BLD CULT: NORMAL
BACTERIA BLD CULT: NORMAL
BACTERIA SPEC CULT: ABNORMAL
BACTERIA SPEC CULT: ABNORMAL
BILIRUB SERPL-MCNC: 0.8 MG/DL (ref 0–1.2)
BUN SERPL-MCNC: 30 MG/DL (ref 6–23)
CALCIUM SERPL-MCNC: 10.2 MG/DL (ref 8.6–10.3)
CHLORIDE SERPL-SCNC: 95 MMOL/L (ref 98–107)
CO2 SERPL-SCNC: 33 MMOL/L (ref 21–32)
CREAT SERPL-MCNC: 1.11 MG/DL (ref 0.5–1.3)
EGFRCR SERPLBLD CKD-EPI 2021: 76 ML/MIN/1.73M*2
ERYTHROCYTE [DISTWIDTH] IN BLOOD BY AUTOMATED COUNT: 14.1 % (ref 11.5–14.5)
GLUCOSE BLD MANUAL STRIP-MCNC: 176 MG/DL (ref 74–99)
GLUCOSE BLD MANUAL STRIP-MCNC: 208 MG/DL (ref 74–99)
GLUCOSE SERPL-MCNC: 183 MG/DL (ref 74–99)
GRAM STN SPEC: ABNORMAL
HCT VFR BLD AUTO: 45.4 % (ref 41–52)
HGB BLD-MCNC: 14.7 G/DL (ref 13.5–17.5)
MAGNESIUM SERPL-MCNC: 1.65 MG/DL (ref 1.6–2.4)
MCH RBC QN AUTO: 29.3 PG (ref 26–34)
MCHC RBC AUTO-ENTMCNC: 32.4 G/DL (ref 32–36)
MCV RBC AUTO: 90 FL (ref 80–100)
NRBC BLD-RTO: 0 /100 WBCS (ref 0–0)
PLATELET # BLD AUTO: 249 X10*3/UL (ref 150–450)
POTASSIUM SERPL-SCNC: 4 MMOL/L (ref 3.5–5.3)
PROT SERPL-MCNC: 7.3 G/DL (ref 6.4–8.2)
RBC # BLD AUTO: 5.02 X10*6/UL (ref 4.5–5.9)
SODIUM SERPL-SCNC: 134 MMOL/L (ref 136–145)
VANCOMYCIN SERPL-MCNC: 22.2 UG/ML (ref 5–20)
WBC # BLD AUTO: 9.4 X10*3/UL (ref 4.4–11.3)

## 2025-08-04 PROCEDURE — 2500000001 HC RX 250 WO HCPCS SELF ADMINISTERED DRUGS (ALT 637 FOR MEDICARE OP): Performed by: PODIATRIST

## 2025-08-04 PROCEDURE — 99239 HOSP IP/OBS DSCHRG MGMT >30: CPT

## 2025-08-04 PROCEDURE — 2500000001 HC RX 250 WO HCPCS SELF ADMINISTERED DRUGS (ALT 637 FOR MEDICARE OP)

## 2025-08-04 PROCEDURE — RXMED WILLOW AMBULATORY MEDICATION CHARGE

## 2025-08-04 PROCEDURE — 80202 ASSAY OF VANCOMYCIN: CPT | Performed by: PHARMACIST

## 2025-08-04 PROCEDURE — 2500000004 HC RX 250 GENERAL PHARMACY W/ HCPCS (ALT 636 FOR OP/ED)

## 2025-08-04 PROCEDURE — 85027 COMPLETE CBC AUTOMATED: CPT

## 2025-08-04 PROCEDURE — 2500000004 HC RX 250 GENERAL PHARMACY W/ HCPCS (ALT 636 FOR OP/ED): Performed by: PHARMACIST

## 2025-08-04 PROCEDURE — 83735 ASSAY OF MAGNESIUM: CPT

## 2025-08-04 PROCEDURE — 2500000002 HC RX 250 W HCPCS SELF ADMINISTERED DRUGS (ALT 637 FOR MEDICARE OP, ALT 636 FOR OP/ED): Performed by: PODIATRIST

## 2025-08-04 PROCEDURE — 36415 COLL VENOUS BLD VENIPUNCTURE: CPT

## 2025-08-04 PROCEDURE — 82565 ASSAY OF CREATININE: CPT

## 2025-08-04 PROCEDURE — 82947 ASSAY GLUCOSE BLOOD QUANT: CPT

## 2025-08-04 RX ORDER — AMOXICILLIN AND CLAVULANATE POTASSIUM 875; 125 MG/1; MG/1
1 TABLET, FILM COATED ORAL EVERY 12 HOURS SCHEDULED
Qty: 34 TABLET | Refills: 0 | Status: SHIPPED | OUTPATIENT
Start: 2025-08-04 | End: 2025-08-21

## 2025-08-04 RX ORDER — ALBUTEROL SULFATE 0.83 MG/ML
3 SOLUTION RESPIRATORY (INHALATION)
Status: DISCONTINUED | OUTPATIENT
Start: 2025-08-04 | End: 2025-08-04 | Stop reason: HOSPADM

## 2025-08-04 RX ORDER — AMOXICILLIN AND CLAVULANATE POTASSIUM 875; 125 MG/1; MG/1
1 TABLET, FILM COATED ORAL EVERY 12 HOURS SCHEDULED
Status: DISCONTINUED | OUTPATIENT
Start: 2025-08-04 | End: 2025-08-04 | Stop reason: HOSPADM

## 2025-08-04 RX ORDER — EPINEPHRINE 1 MG/ML
0.3 INJECTION INTRAMUSCULAR; INTRAVENOUS; SUBCUTANEOUS ONCE AS NEEDED
Status: DISCONTINUED | OUTPATIENT
Start: 2025-08-04 | End: 2025-08-04 | Stop reason: HOSPADM

## 2025-08-04 RX ORDER — DEXTROSE 50 % IN WATER (D50W) INTRAVENOUS SYRINGE
25
Status: DISCONTINUED | OUTPATIENT
Start: 2025-08-04 | End: 2025-08-04 | Stop reason: HOSPADM

## 2025-08-04 RX ORDER — FAMOTIDINE 10 MG/ML
20 INJECTION, SOLUTION INTRAVENOUS ONCE AS NEEDED
Status: DISCONTINUED | OUTPATIENT
Start: 2025-08-04 | End: 2025-08-04 | Stop reason: HOSPADM

## 2025-08-04 RX ORDER — DEXTROSE 50 % IN WATER (D50W) INTRAVENOUS SYRINGE
12.5
Status: DISCONTINUED | OUTPATIENT
Start: 2025-08-04 | End: 2025-08-04 | Stop reason: HOSPADM

## 2025-08-04 RX ORDER — ALBUTEROL SULFATE 0.83 MG/ML
3 SOLUTION RESPIRATORY (INHALATION) ONCE AS NEEDED
Status: DISCONTINUED | OUTPATIENT
Start: 2025-08-04 | End: 2025-08-04 | Stop reason: HOSPADM

## 2025-08-04 RX ORDER — AMOXICILLIN 500 MG/1
500 CAPSULE ORAL ONCE
Status: COMPLETED | OUTPATIENT
Start: 2025-08-04 | End: 2025-08-04

## 2025-08-04 RX ORDER — INSULIN GLARGINE 100 [IU]/ML
5 INJECTION, SOLUTION SUBCUTANEOUS DAILY
Status: DISCONTINUED | OUTPATIENT
Start: 2025-08-04 | End: 2025-08-04 | Stop reason: HOSPADM

## 2025-08-04 RX ORDER — INSULIN GLARGINE 100 [IU]/ML
8 INJECTION, SOLUTION SUBCUTANEOUS EVERY 24 HOURS
Status: DISCONTINUED | OUTPATIENT
Start: 2025-08-04 | End: 2025-08-04 | Stop reason: HOSPADM

## 2025-08-04 RX ADMIN — ACARBOSE 50 MG: 25 TABLET ORAL at 16:21

## 2025-08-04 RX ADMIN — VANCOMYCIN HYDROCHLORIDE 1.5 G: 1.5 INJECTION, SOLUTION INTRAVITREAL at 13:49

## 2025-08-04 RX ADMIN — LOSARTAN POTASSIUM 25 MG: 25 TABLET, FILM COATED ORAL at 09:27

## 2025-08-04 RX ADMIN — TORSEMIDE 20 MG: 20 TABLET ORAL at 09:27

## 2025-08-04 RX ADMIN — ACARBOSE 50 MG: 25 TABLET ORAL at 09:31

## 2025-08-04 RX ADMIN — GABAPENTIN 100 MG: 100 CAPSULE ORAL at 09:27

## 2025-08-04 RX ADMIN — ACETAMINOPHEN 650 MG: 325 TABLET ORAL at 10:22

## 2025-08-04 RX ADMIN — TIOTROPIUM BROMIDE INHALATION SPRAY 2 PUFF: 3.12 SPRAY, METERED RESPIRATORY (INHALATION) at 16:21

## 2025-08-04 RX ADMIN — PANTOPRAZOLE SODIUM 40 MG: 40 TABLET, DELAYED RELEASE ORAL at 09:27

## 2025-08-04 RX ADMIN — FLUTICASONE FUROATE AND VILANTEROL TRIFENATATE 1 PUFF: 200; 25 POWDER RESPIRATORY (INHALATION) at 16:21

## 2025-08-04 RX ADMIN — POTASSIUM CHLORIDE 10 MEQ: 750 TABLET, EXTENDED RELEASE ORAL at 09:27

## 2025-08-04 RX ADMIN — BUPROPION HYDROCHLORIDE 150 MG: 150 TABLET, FILM COATED, EXTENDED RELEASE ORAL at 09:27

## 2025-08-04 RX ADMIN — SPIRONOLACTONE 25 MG: 25 TABLET ORAL at 09:28

## 2025-08-04 RX ADMIN — ARIPIPRAZOLE 5 MG: 5 TABLET ORAL at 09:27

## 2025-08-04 RX ADMIN — ACETAMINOPHEN 650 MG: 325 TABLET ORAL at 05:59

## 2025-08-04 RX ADMIN — ACARBOSE 50 MG: 25 TABLET ORAL at 12:40

## 2025-08-04 RX ADMIN — SERTRALINE HYDROCHLORIDE 50 MG: 50 TABLET ORAL at 09:27

## 2025-08-04 RX ADMIN — VANCOMYCIN HYDROCHLORIDE 1.5 G: 1.5 INJECTION, SOLUTION INTRAVITREAL at 01:36

## 2025-08-04 RX ADMIN — AMOXICILLIN 500 MG: 500 CAPSULE ORAL at 16:54

## 2025-08-04 RX ADMIN — CEFTRIAXONE 2 G: 2 INJECTION, SOLUTION INTRAVENOUS at 12:40

## 2025-08-04 ASSESSMENT — COGNITIVE AND FUNCTIONAL STATUS - GENERAL
MOBILITY SCORE: 24
DAILY ACTIVITIY SCORE: 24

## 2025-08-04 ASSESSMENT — PAIN SCALES - GENERAL
PAINLEVEL_OUTOF10: 6
PAINLEVEL_OUTOF10: 3
PAINLEVEL_OUTOF10: 0 - NO PAIN

## 2025-08-04 ASSESSMENT — ACTIVITIES OF DAILY LIVING (ADL): LACK_OF_TRANSPORTATION: NO

## 2025-08-04 ASSESSMENT — PAIN DESCRIPTION - DESCRIPTORS: DESCRIPTORS: ACHING

## 2025-08-04 ASSESSMENT — PAIN - FUNCTIONAL ASSESSMENT
PAIN_FUNCTIONAL_ASSESSMENT: 0-10

## 2025-08-04 ASSESSMENT — PAIN DESCRIPTION - LOCATION: LOCATION: FOOT

## 2025-08-04 NOTE — PROGRESS NOTES
08/04/25 1041   Discharge Planning   Living Arrangements Spouse/significant other;Children   Support Systems Spouse/significant other;Children   Assistance Needed Independent   Type of Residence Private residence   Home or Post Acute Services None   Expected Discharge Disposition Home   Does the patient need discharge transport arranged? No   Financial Resource Strain   How hard is it for you to pay for the very basics like food, housing, medical care, and heating? Not hard   Housing Stability   In the last 12 months, was there a time when you were not able to pay the mortgage or rent on time? N   At any time in the past 12 months, were you homeless or living in a shelter (including now)? N   Transportation Needs   In the past 12 months, has lack of transportation kept you from medical appointments or from getting medications? no   In the past 12 months, has lack of transportation kept you from meetings, work, or from getting things needed for daily living? No     PCP is Lisseth Thomas DO. Patient is from home with his wife and his daughter. Patient is independent with ambulation, self care, driving, shopping, and meals.  Patient prefers to return home with no needs upon discharge. [facility] will continue to follow for needs if they arise.

## 2025-08-04 NOTE — DISCHARGE SUMMARY
Discharge Diagnosis  Other osteomyelitis of right foot s/p partial amputation of rt third toe           Issues Requiring Follow-Up  Follow up with podiatry in 1-2 weeks   Foolow up with pcp in 2 weeks    Discharge Meds     Medication List      START taking these medications     amoxicillin-clavulanate 875-125 mg tablet; Commonly known as: Augmentin;   Take 1 tablet by mouth every 12 hours.     CHANGE how you take these medications     Trulicity 4.5 mg/0.5 mL pen injector; Generic drug: dulaglutide; Inject   4.5 mg under the skin 1 (one) time per week.; What changed: additional   instructions     CONTINUE taking these medications     acarbose 50 mg tablet; Commonly known as: Precose; Take 1 tablet (50 mg)   by mouth 3 times daily (morning, midday, late afternoon).   albuterol 90 mcg/actuation inhaler; Inhale 2 puffs every 4 hours if   needed for shortness of breath.   ARIPiprazole 5 mg tablet; Commonly known as: Abilify   atorvastatin 80 mg tablet; Commonly known as: Lipitor; Take 1 tablet (80   mg) by mouth once daily.   Blood Glucose Test; Generic drug: blood sugar diagnostic; Test once   daily in the morning   blood-glucose meter misc; Use to test blood sugars once daily   budesonide-formoterol 160-4.5 mcg/actuation inhaler; Commonly known as:   Symbicort; Inhale 2 puffs 2 times a day. Rinse mouth with water after use   to reduce aftertaste and incidence of candidiasis. Do not swallow.   buPROPion  mg 12 hr tablet; Commonly known as: Wellbutrin SR; Take   1 tablet (150 mg) by mouth 2 times a day. Do not crush, chew, or split.   dapagliflozin propanediol 10 mg tablet; Commonly known as: Farxiga; Take   1 tablet (10 mg) by mouth once daily.   FreeStyle Aliza 3 Sensor device; Generic drug: blood-glucose sensor; Use   to check blood sugars daily. Change sensors every 14 days   gabapentin 100 mg capsule; Commonly known as: Neurontin; Take 1 capsule   (100 mg) by mouth 2 times a day.   glipiZIDE 5 mg tablet;  Commonly known as: Glucotrol; Take 1 tablet (5   mg) by mouth 2 times a day before meals.   ipratropium-albuteroL 0.5-2.5 mg/3 mL nebulizer solution; Commonly known   as: Duo-Neb; Take 3 mL by nebulization every 4 hours if needed for   wheezing.   * lancets misc; Test once daily in the morning   * OneTouch Delica Plus Lancet 30 gauge misc; Generic drug: lancets   losartan 25 mg tablet; Commonly known as: Cozaar; Take 1 tablet (25 mg)   by mouth once daily.   omeprazole 40 mg DR capsule; Commonly known as: PriLOSEC; Take 1 capsule   (40 mg) by mouth once daily in the morning. Take before meals. Do not   crush or chew.   potassium chloride CR 10 mEq ER tablet; Commonly known as: Klor-Con;   Take 1 tablet (10 mEq) by mouth once daily.   sertraline 50 mg tablet; Commonly known as: Zoloft; Take 1 tablet (50   mg) by mouth once daily.   Spiriva Respimat 2.5 mcg/actuation inhaler; Generic drug: tiotropium;   Inhale 2 puffs once daily.   spironolactone 25 mg tablet; Commonly known as: Aldactone; Take 1 tablet   (25 mg) by mouth once daily.   torsemide 20 mg tablet; Commonly known as: Demadex; Take 1 tablet (20   mg) by mouth once daily. as directed  * This list has 2 medication(s) that are the same as other medications   prescribed for you. Read the directions carefully, and ask your doctor or   other care provider to review them with you.     STOP taking these medications     metFORMIN 1,000 mg tablet; Commonly known as: Glucophage       Test Results Pending At Discharge  Pending Labs       Order Current Status    Blood Culture Preliminary result    Blood Culture Preliminary result    Tissue/Wound Culture/Smear Preliminary result            Hospital Course   Jamil Seo is a 60-year-old male with past medical history of NAVYA, hypertension, COPD, type II DM, complicated podiatry history including hallux valgus and rigidus of both feet and pes valgus and planus of the right foot status post right decompression osteotomy and  bunionectomy with implant placement, distal phalanx resection and MPJ capsulotomy of the right first digit in 2022.  He also underwent removal of the hardware and incision and drainage in 2023 due to cellulitis and symptomatic internal fixation displaced/dislodged right foot.  Patient is admitted at this time for osteomyelitis of the right foot and underwent right third toe partial amputation on 8/2/2025.  Infectious diseases also followed the patient throughout the hospital course.  Intraoperative cultures are positive for aligenes faecalis and enterococcus faecalis. Pt is s/p vancomycin and ceftriaxone that was started empirically post op and now is  recommended to be discharged on oral augmetin until 8/22/2025. Since , pt has been documented to have penicillin allergy and having rashes with it as child, he underwent oral penicillin challenge test today that he passed. Therefore, he is okay to be discharged home with oral augmentin to complete the course of antibiotics.      I spent >30 min on discharge.    Pertinent Physical Exam At Time of Discharge  Physical Exam    General: alert, oriented, NAD  HEENT: No conjunctival pallor, no scleral icterus  Lungs: bilaterally clear to auscultation, no wheezing  Abdomen: soft, non tender, non distended, BS+  Neuro: AAO x 3, CN grossly intact  Extremities: B/L LE edema  Skin: R foot dressing  MSK: No joint inflammation    Outpatient Follow-Up  Future Appointments   Date Time Provider Department Center   8/18/2025 11:30 AM Bre Sosa APRN-CNP [id number] [facility]   8/28/2025 11:20 AM Terri Lucas APRN-CNP [id number] [place]   9/2/2025 10:50 AM DO Allyson [id number] [facility]   9/4/2025  3:30 PM [facility] [id number] [facility]   9/5/2025  9:20 AM Nancy Alanis, Sarah [id number] [facility]   9/24/2025 10:30 AM Pradeep Gray MD [id number] [facility]   3/31/2026  1:00 PM Orquidea Núñez MD [id number] [facility]   5/12/2026 11:00 AM Tashia THURMAN  Slava, APRN-[facility] [id number] [facility]         Savita Marshall MD

## 2025-08-04 NOTE — PROGRESS NOTES
Vancomycin Dosing by Pharmacy- FOLLOW UP    Morro Seo is a 60 y.o. year old male who Pharmacy has been consulted for vancomycin dosing for osteomyelitis/septic arthritis. Based on the patient's indication and renal status this patient is being dosed based on a goal AUC of 400-600.     Renal function is currently declining.    Current vancomycin dose: 1500 mg given every 12 hours    Estimated vancomycin AUC on current dose: 505 mg/L.hr     Visit Vitals  /67 (BP Location: Left arm, Patient Position: Sitting)   Pulse 79   Temp 35.9 °C (96.7 °F) (Temporal)   Resp 18        Lab Results   Component Value Date    CREATININE 1.11 2025    CREATININE 0.97 2025    CREATININE 0.92 2025    CREATININE 0.85 2025    CREATININE 0.89 2025    CREATININE 0.75 2025        Patient weight is as follows:   Vitals:    25 1014   Weight: 130 kg (286 lb 13.1 oz)       Cultures:  Susceptibility data for the encounter in last 14 days.  Collected Specimen Info Organism   25 Tissue/Biopsy from Wound/Tissue Mixed Gram-Positive and Gram-Negative Bacteria        I/O last 3 completed shifts:  In: 776 (6 mL/kg) [P.O.:476; IV Piggyback:300]  Out: - (0 mL/kg)   Weight: 130.1 kg   I/O during current shift:  No intake/output data recorded.    Temp (24hrs), Av.1 °C (96.9 °F), Min:35.9 °C (96.7 °F), Max:36.2 °C (97.1 °F)      Assessment/Plan    Within goal AUC range. Continue current vancomycin regimen.    This dosing regimen is predicted by [employer] to result in the following pharmacokinetic parameters:  Regimen: 1500 mg IV every 12 hours.  Start time: 13:36 on 2025  Exposure target: AUC24 (range) 400-600 mg/L.hr   TCK77-25: 500 mg/L.hr  AUC24,ss: 505 mg/L.hr  Probability of AUC24 > 400: 78 %  Ctrough,ss: 15.1 mg/L  Probability of Ctrough,ss > 20: 31 %      The next level will be obtained on 8/7 at 0500. May be obtained sooner if clinically indicated.   Will continue to monitor renal  function daily while on vancomycin and order serum creatinine at least every 48 hours if not already ordered.  Follow for continued vancomycin needs, clinical response, and signs/symptoms of toxicity.       Carlos Overton, [facility]

## 2025-08-04 NOTE — ASSESSMENT & PLAN NOTE
- insulin sliding scale   -Pt no longer taking Farxiga, glipizide and metformin-he is now on Trulicity  -wound care  -A1c 7.3 (06/17/25)   -has hyperglycemia in am(pt says he has a follow up appt and his trulicity dose will increase in few days in upcoming appt)    #HTN   -losartan 25 mg daily, Aldactone 25 mg daily, torsemide 20 mg daily     #NAVYA   CPAP      #T2DM  -A1c 7.3 (06/17/25)     Code Status: Full Code   DVT ppx: SCDs, Lovenox

## 2025-08-04 NOTE — PROGRESS NOTES
Occupational Therapy                 Therapy Communication Note    Patient Name: Morro Seo  MRN: [id number]  Department: [facility] 2 E  [facility]  Today's Date: 8/4/2025     Discipline: Occupational Therapy    OT SCREEN: Pt seen in room 2330 sitting up in chair with surgical shoe on R foot. Pt reports he has been up independently walking around hallways and participating in toileting without concerns. RN confirmed. Pt has no concerns returning home at this time other than driving/transportation. Observed pt functional mobility managing IV pole without concerns. Will complete OT orders, no acute OT needs identified.

## 2025-08-04 NOTE — PROGRESS NOTES
"Morro Seo is a 60 y.o. male on day 4 of admission presenting with Other osteomyelitis of right foot.    Subjective   Patient is sitting up to the chair.  No fresh complaints offered.  Denies fever/chills.  Pain is well-controlled.       Objective     Physical Exam  General: alert, oriented, NAD  HEENT: No conjunctival pallor, no scleral icterus  Lungs: bilaterally clear to auscultation, no wheezing  Abdomen: soft, non tender, non distended, BS+  Neuro: AAO x 3, CN grossly intact  Extremities: B/L LE edema  Skin: R foot dressing  MSK: No joint inflammation      Last Recorded Vitals  Blood pressure 110/69, pulse 75, temperature 36.4 °C (97.5 °F), temperature source Temporal, resp. rate 16, height 1.778 m (5' 10\"), weight 130 kg (286 lb 13.1 oz), SpO2 96%.  Intake/Output last 3 Shifts:  I/O last 3 completed shifts:  In: 776 (6 mL/kg) [P.O.:476; IV Piggyback:300]  Out: - (0 mL/kg)   Weight: 130.1 kg     Results for orders placed or performed during the hospital encounter of 07/31/25 (from the past 24 hours)   POCT GLUCOSE   Result Value Ref Range    POCT Glucose 162 (H) 74 - 99 mg/dL   POCT GLUCOSE   Result Value Ref Range    POCT Glucose 228 (H) 74 - 99 mg/dL   Vancomycin   Result Value Ref Range    Vancomycin 22.2 (H) 5.0 - 20.0 ug/mL   CBC   Result Value Ref Range    WBC 9.4 4.4 - 11.3 x10*3/uL    nRBC 0.0 0.0 - 0.0 /100 WBCs    RBC 5.02 4.50 - 5.90 x10*6/uL    Hemoglobin 14.7 13.5 - 17.5 g/dL    Hematocrit 45.4 41.0 - 52.0 %    MCV 90 80 - 100 fL    MCH 29.3 26.0 - 34.0 pg    MCHC 32.4 32.0 - 36.0 g/dL    RDW 14.1 11.5 - 14.5 %    Platelets 249 150 - 450 x10*3/uL   Comprehensive Metabolic Panel   Result Value Ref Range    Glucose 183 (H) 74 - 99 mg/dL    Sodium 134 (L) 136 - 145 mmol/L    Potassium 4.0 3.5 - 5.3 mmol/L    Chloride 95 (L) 98 - 107 mmol/L    Bicarbonate 33 (H) 21 - 32 mmol/L    Anion Gap 10 10 - 20 mmol/L    Urea Nitrogen 30 (H) 6 - 23 mg/dL    Creatinine 1.11 0.50 - 1.30 mg/dL    eGFR 76 >60 " mL/min/1.73m*2    Calcium 10.2 8.6 - 10.3 mg/dL    Albumin 4.3 3.4 - 5.0 g/dL    Alkaline Phosphatase 76 33 - 136 U/L    Total Protein 7.3 6.4 - 8.2 g/dL    AST 21 9 - 39 U/L    Bilirubin, Total 0.8 0.0 - 1.2 mg/dL    ALT 25 10 - 52 U/L   Magnesium   Result Value Ref Range    Magnesium 1.65 1.60 - 2.40 mg/dL   POCT GLUCOSE   Result Value Ref Range    POCT Glucose 208 (H) 74 - 99 mg/dL   POCT GLUCOSE   Result Value Ref Range    POCT Glucose 176 (H) 74 - 99 mg/dL     *Note: Due to a large number of results and/or encounters for the requested time period, some results have not been displayed. A complete set of results can be found in Results Review.          Imaging     Vascular US Lower Extremity Venous Duplex Right  Result Date: 7/31/2025  No sonographic evidence for deep vein thrombosis within the evaluated veins of the right lower extremity. The calf veins were not visualized due to overlying edema.   MACRO: None     Signed by: Gregory Quintanilla 7/31/2025 12:08 PM Dictation workstation:   [id number]     CT foot right wo IV contrast  Result Date: 7/31/2025  1. Skin ulceration of the plantar distal 3rd digit with generalized subcutaneous edema and cellulitis.   2. Osteomyelitis of the tuft 2nd distal phalanx with superimposed subtle fracture within the tuft.                Assessment & Plan  Acute osteomyelitis of phalanx of right foot (Multi)  - Status post partial amputation of right third toe  - on IV ceftriaxone and vancomycin empirically for osteomyelitis  - Pain is well-controlled with Tylenol as needed  - Podiatry and infectious diseases following  -Awaited tissue/wound culture from 8/2/2025-- preliminary reviewed-shows 4+, grampositive cocci in pairs and chains  - Once identification is out, can likely discharge on p.o. antibiotics through August 22 as per ID.    Diabetic ulcer of toe of right foot associated with type 2 diabetes mellitus, with necrosis of bone  - insulin sliding scale   -Pt no longer taking  Farxiga, glipizide and metformin-he is now on Trulicity  -wound care  -A1c 7.3 (06/17/25)   -has hyperglycemia in am(pt says he has a follow up appt and his trulicity dose will increase in few days in upcoming appt)    #HTN   -losartan 25 mg daily, Aldactone 25 mg daily, torsemide 20 mg daily     #NAVYA   CPAP      #T2DM  -A1c 7.3 (06/17/25)     Code Status: Full Code   DVT ppx: SCDs, Gato Marshall MD

## 2025-08-04 NOTE — PROGRESS NOTES
"Nutrition Follow Up    Nutrition Assessment         Recent events since last RDN visit:    8/4: Pt s/p partial amputation of R 3rd toe 8/2 per physician progress notes. Pt reports he has been eating well for meals on CCD 60 g/meal diet. Pt reports Tyrell is \"not that bad\" and has been drinking it BID.    Nutrition Intake Since Last RDN Visit:  Energy Intake: Good > 75 %       Anthropometrics:  Height: 177.8 cm (5' 10\")   Weight: 130 kg (286 lb 13.1 oz)   BMI (Calculated): 41.15  IBW/kg (Dietitian Calculated): 75.5 kg  Percent of IBW: 172.2 %  Adjusted Body Weight (kg): 89.1 kg                     Nutrition Focused Physical Exam Findings:    Subcutaneous Fat Loss:   Orbital Fat Pads: Well nourished (slightly bulging fat pads)  Buccal Fat Pads: Well nourished (full, rounded cheeks)  Triceps: Well nourished (ample fat tissue)  Muscle Wasting:  Temporalis: Well nourished (well-defined muscle)  Pectoralis (Clavicular Region): Well nourished (clavicle not visible)  Deltoid/Trapezius: Well nourished (rounded appearance at arm, shoulder, neck)  Interosseous: Well nourished (muscle bulges)  Edema:  Edema Location: +1 RLE edema noted per Nursing Flowsheet (likely related to R foot wound).  Physical Findings:  Skin: Positive (R foot osteomyelitis)    Nutrition Significant Labs:  CBC Trend:   Results from last 7 days   Lab Units 08/04/25  0429 08/03/25  0337 08/01/25  0504 07/31/25  1044   WBC AUTO x10*3/uL 9.4 10.1 8.6 9.3   RBC AUTO x10*6/uL 5.02 4.81 4.55 4.92   HEMOGLOBIN g/dL 14.7 14.5 13.4* 15.0   HEMATOCRIT % 45.4 43.6 41.6 44.4   MCV fL 90 91 91 90   PLATELETS AUTO x10*3/uL 249 228 235 250    , BMP Trend:   Results from last 7 days   Lab Units 08/04/25  0429 08/03/25  0337 08/01/25  0504 07/31/25  1044   GLUCOSE mg/dL 183* 199* 188* 81   CALCIUM mg/dL 10.2 10.0 9.3 10.1   SODIUM mmol/L 134* 134* 138 139   POTASSIUM mmol/L 4.0 4.1 4.0 4.1   CO2 mmol/L 33* 31 30 31   CHLORIDE mmol/L 95* 97* 104 101   BUN mg/dL 30* 26* 10 " 11   CREATININE mg/dL 1.11 0.97 0.92 0.85    , A1C:  Lab Results   Component Value Date    HGBA1C 7.3 (H) 06/17/2025       I/O:   Last BM Date: 07/31/25; Stool Appearance: Formed (per pt) (07/31/25 2100)    Dietary Orders (From admission, onward)       Start     Ordered    08/02/25 1026  Adult diet Consistent Carb; CCD 60 gm/meal  Diet effective now        Question Answer Comment   Diet type Consistent Carb    Carb diet selection: CCD 60 gm/meal        08/02/25 1025    08/01/25 1346  Oral nutritional supplements  Until discontinued        Comments: Fruit Punch   Question Answer Comment   Deliver with Breakfast    Deliver with Dinner    Select supplement: Tyrell        08/01/25 1346    07/31/25 1327  May Participate in Room Service  ( ROOM SERVICE MAY PARTICIPATE)  Once        Question:  .  Answer:  Yes    07/31/25 1326                     Estimated Needs:   Total Energy Estimated Needs in 24 hours (kCal): 2200 kCal  Method for Estimating Needs: 25 kcal/kg Adjusted BW  Total Protein Estimated Needs in 24 Hours (g): 107 g  Method for Estimating 24 Hour Protein Needs: 1.2 g/kg Adjusted BW     Method for Estimating 24 Hour Fluid Needs: 1 ml/kcal  Patient on Order Fluid Restriction: No        Nutrition Diagnosis   Malnutrition Diagnosis  Patient has Malnutrition Diagnosis: No    Nutrition Diagnosis  Patient has Nutrition Diagnosis: Yes  Diagnosis Status (1): New  Nutrition Diagnosis 1: Increased nutrient needs  Related to (1): for protein to promote wound healing  As Evidenced by (1): R foot wound/osteomyelitis; pt reports wound present for ~2 months.       Nutrition Interventions/Recommendations   Nutrition prescription for oral nutrition    Nutrition Recommendations:  Individualized Nutrition Prescription Provided for : Continue Consistent Carb Diet per MD order. Continue Fruit Punch Tyrell BID with meals to provide arginine and glutamine to aid in wound healing.    Nutrition Interventions/Goals:   Meals and Snacks:  General healthful diet, Carbohydrate-modified diet  Goal: Maintain intake at 75% or greater for most meals.  Medical Food Supplement: Commercial beverage medical food supplement therapy  Goal: Consume 75% or greater for Tyrell BID.      Education Documentation  Nutrition Related Education, taught by Maritza Lindquist RDN, MARIO at 8/1/2025  2:13 PM.  Learner: Family, Patient  Readiness: Acceptance  Method: Explanation  Response: Verbalizes Understanding  Comment: Explained rationale for Tyrell to aid in wound healing; pt agreeable to try. Provided handouts and coupons for home.              Nutrition Monitoring and Evaluation   Estimated Energy Intake: Energy intake greater or equal to 75% of estimated energy needs  Intake / Amount of food: Consumes at least 75% or more of meals/snacks/supplements    Body Weight: Body weight - Maintain stable weight    Electrolyte and Renal Panel: Electrolytes within normal limits  Glucose/Endocrine Profile: Glucose within normal limits ( mg/dL)    Skin Finding: Impaired wound healing - Improved wound healing    Goal Status: Goal(s) achieved    Time Spent (min): 35 minutes

## 2025-08-04 NOTE — ASSESSMENT & PLAN NOTE
- Status post partial amputation of right third toe  - on IV ceftriaxone and vancomycin empirically for osteomyelitis  - Pain is well-controlled with Tylenol as needed  - Podiatry and infectious diseases following  -Awaited tissue/wound culture from 8/2/2025-- preliminary reviewed-shows 4+, grampositive cocci in pairs and chains  - Once identification is out, can likely discharge on p.o. antibiotics through August 22 as per ID.

## 2025-08-04 NOTE — CARE PLAN
Problem: Pain - Adult  Goal: Verbalizes/displays adequate comfort level or baseline comfort level  Outcome: Progressing  Flowsheets (Taken 8/4/2025 1121)  Verbalizes/displays adequate comfort level or baseline comfort level: Encourage patient to monitor pain and request assistance     Problem: Safety - Adult  Goal: Free from fall injury  Outcome: Progressing  Flowsheets (Taken 8/4/2025 1121)  Free from fall injury: Instruct family/caregiver on patient safety     Problem: Chronic Conditions and Co-morbidities  Goal: Patient's chronic conditions and co-morbidity symptoms are monitored and maintained or improved  Outcome: Progressing  Flowsheets (Taken 8/4/2025 1121)  Care Plan - Patient's Chronic Conditions and Co-Morbidity Symptoms are Monitored and Maintained or Improved: Monitor and assess patient's chronic conditions and comorbid symptoms for stability, deterioration, or improvement     Problem: Skin  Goal: Promote/optimize nutrition  Outcome: Progressing  Flowsheets (Taken 8/4/2025 1121)  Promote/optimize nutrition: Consume > 50% meals/supplements     Problem: Diabetes  Goal: Maintain glucose levels >70mg/dl to <250mg/dl throughout shift  Outcome: Progressing  Flowsheets (Taken 8/4/2025 1121)  Maintain glucose levels >70mg/dl to <250mg/dl throughout shift: Med administration/monitoring of effect     Problem: Pain  Goal: Takes deep breaths with improved pain control throughout the shift  Outcome: Progressing   The patient's goals for the shift include      The clinical goals for the shift include Pt will remain safe and not fall during shift

## 2025-08-04 NOTE — PROGRESS NOTES
Physical Therapy SCREEN                 Therapy Communication Note    Patient Name: Morro Seo  MRN: [id number]  Department: [facility] E  Room: [address] [place], [place] [address]A  Today's Date: 8/4/2025     Discipline: Physical Therapy    Comment: observed pt to have indep amb in room, wearing R POSTOP SHOE. Pt states he has been having no difficulty amb post-op, describes walking hallways also.    No need for Skilled PT in hospital or after disch.

## 2025-08-05 ENCOUNTER — TELEPHONE (OUTPATIENT)
Dept: PRIMARY CARE | Facility: CLINIC | Age: 60
End: 2025-08-05
Payer: MEDICARE

## 2025-08-05 NOTE — TELEPHONE ENCOUNTER
----- Message from Mala Macias sent at 8/5/2025  8:07 AM EDT -----    ----- Message -----  From: Lisseth Thomas DO  Sent: 8/4/2025   9:24 PM EDT  To:  [id number] [facility] Clinical Support Staff    Pt needs hospital follow up

## 2025-08-06 ENCOUNTER — DOCUMENTATION (OUTPATIENT)
Dept: INFECTIOUS DISEASES | Facility: HOSPITAL | Age: 60
End: 2025-08-06
Payer: MEDICARE

## 2025-08-06 NOTE — PROGRESS NOTES
Patient's culture reviewed. Alcaligenes faecalis resistant to cefepime, ceftazidime, ciprofloxacin but sensitive to ceftriaxone.  Susceptible to Bactrim but listed rash as an allergy to it.    Also discussed with Dr. Hirsch.  The cultures were taken from the soft tissue and not from the healthy bone site.  As per Dr. Hirsch, macroscopically the margins look clean.  Patient is currently on Augmentin.  Will follow-up in the clinic next Monday to evaluate the wound and need of any further IV antibiotics    Patient also states that he is developing a fluid collection on his forearm where the IV was placed.  Recommended to come to the hospital if develops fever chills, fatigue.  Patient is seeing his PCP tomorrow

## 2025-08-07 ENCOUNTER — OFFICE VISIT (OUTPATIENT)
Dept: PRIMARY CARE | Facility: CLINIC | Age: 60
End: 2025-08-07
Payer: MEDICARE

## 2025-08-07 VITALS
HEART RATE: 84 BPM | DIASTOLIC BLOOD PRESSURE: 68 MMHG | OXYGEN SATURATION: 96 % | WEIGHT: 290 LBS | SYSTOLIC BLOOD PRESSURE: 134 MMHG | BODY MASS INDEX: 41.61 KG/M2 | TEMPERATURE: 97.4 F

## 2025-08-07 DIAGNOSIS — L03.115 CELLULITIS OF RIGHT LOWER EXTREMITY: Primary | ICD-10-CM

## 2025-08-07 LAB
BACTERIA SPEC CULT: ABNORMAL
BACTERIA SPEC CULT: ABNORMAL
GRAM STN SPEC: ABNORMAL
GRAM STN SPEC: ABNORMAL

## 2025-08-07 PROCEDURE — 99214 OFFICE O/P EST MOD 30 MIN: CPT | Performed by: FAMILY MEDICINE

## 2025-08-07 PROCEDURE — 3075F SYST BP GE 130 - 139MM HG: CPT | Performed by: FAMILY MEDICINE

## 2025-08-07 PROCEDURE — 4010F ACE/ARB THERAPY RXD/TAKEN: CPT | Performed by: FAMILY MEDICINE

## 2025-08-07 PROCEDURE — G2211 COMPLEX E/M VISIT ADD ON: HCPCS | Performed by: FAMILY MEDICINE

## 2025-08-07 PROCEDURE — 3078F DIAST BP <80 MM HG: CPT | Performed by: FAMILY MEDICINE

## 2025-08-07 ASSESSMENT — ENCOUNTER SYMPTOMS
CONSTITUTIONAL NEGATIVE: 1
RESPIRATORY NEGATIVE: 1
MUSCULOSKELETAL NEGATIVE: 1
WOUND: 1
GASTROINTESTINAL NEGATIVE: 1
PSYCHIATRIC NEGATIVE: 1
NEUROLOGICAL NEGATIVE: 1
EYES NEGATIVE: 1
CARDIOVASCULAR NEGATIVE: 1
FEVER: 0
WHEEZING: 0
SHORTNESS OF BREATH: 0
FATIGUE: 0

## 2025-08-07 NOTE — PATIENT INSTRUCTIONS
Continue your current meds    Follow up with your specialists as scheduled    Return as scheduled, sooner if needed

## 2025-08-07 NOTE — PROGRESS NOTES
Subjective   Patient ID: Morro Seo is a 60 y.o. male who presents for Hospital Follow-up (Hosp follow up7/31-8/4  [facility] Re:  osteomyelitis of right foot s/p partial amputation of rt third toe)    HPI     Cellulitis: Reports cutting himself while trying to shave his callus off his left foot.  Developed redness lower extremity.  Went to the ER and admitted for cellulitis.  Eventually patient had to have his third toe partially amputated.  Since the leaving the hospital, has been feeling well. No fevers or chills since leaving the hospital. Changing dressing regularly. No complaints of abnormal discharge, erythema, pus. Currently taking augmentin. Following with ID.  Remains on Augmentin    Review of Systems   Constitutional: Negative.  Negative for fatigue and fever.   HENT: Negative.     Eyes: Negative.    Respiratory: Negative.  Negative for shortness of breath and wheezing.    Cardiovascular: Negative.    Gastrointestinal: Negative.    Musculoskeletal: Negative.    Skin:  Positive for wound.        Right 3rd toe partial amputation   Allergic/Immunologic: Negative for environmental allergies.   Neurological: Negative.    Psychiatric/Behavioral: Negative.         Objective   /68   Pulse 84   Temp 36.3 °C (97.4 °F)   Wt 132 kg (290 lb)   SpO2 96%   BMI 41.61 kg/m²     Physical Exam  Vitals and nursing note reviewed.   Constitutional:       General: He is not in acute distress.     Appearance: Normal appearance. He is not toxic-appearing.     Eyes:      Extraocular Movements: Extraocular movements intact.      Pupils: Pupils are equal, round, and reactive to light.     Neck:      Vascular: No carotid bruit.     Cardiovascular:      Rate and Rhythm: Normal rate and regular rhythm.      Heart sounds: Normal heart sounds.   Pulmonary:      Effort: Pulmonary effort is normal.      Breath sounds: Normal breath sounds.   Abdominal:      General: There is no distension.      Palpations: Abdomen is soft.       Tenderness: There is no abdominal tenderness.     Musculoskeletal:         General: Normal range of motion.      Cervical back: Normal range of motion.      Right lower leg: Edema (Trace) present.      Left lower leg: Edema (Trace) present.      Comments: Partially amputated right third toe. No erythema, pus, abnormal drainage     Skin:     General: Skin is warm and dry.      Capillary Refill: Capillary refill takes less than 2 seconds.     Neurological:      General: No focal deficit present.      Mental Status: He is alert.     Psychiatric:         Mood and Affect: Mood normal.         Behavior: Behavior normal.         Assessment/Plan   Problem List Items Addressed This Visit    None  Visit Diagnoses         Codes      Cellulitis of right lower extremity    -  Primary L03.115          Morro is a 59yo here for hospital follow up after having osteomyelitis and partial amputation of the right toe. Overall, he is feeling better and has no signs of more severe infection. He has follow up with ID on 8/11. Discussed return precautions.    Nakul Jeffers MS3     I, or a resident under my supervision, was present with the medical student who participated in the documentation of this note.  I have personally seen and examined the patient and performed the medical decision-making components. I have reviewed the medical student documentation and/or resident documentation and verified the findings in the note as written with additions or exceptions as stated in the body of the note.    Reviewed Er reports, labs, imaging. Recommendations given    Lisseth Thomas,

## 2025-08-11 ENCOUNTER — OFFICE VISIT (OUTPATIENT)
Dept: INFECTIOUS DISEASES | Facility: HOSPITAL | Age: 60
End: 2025-08-11
Payer: MEDICARE

## 2025-08-11 VITALS
DIASTOLIC BLOOD PRESSURE: 74 MMHG | SYSTOLIC BLOOD PRESSURE: 129 MMHG | HEART RATE: 80 BPM | OXYGEN SATURATION: 94 % | RESPIRATION RATE: 20 BRPM

## 2025-08-11 DIAGNOSIS — M86.9 OSTEOMYELITIS OF THIRD TOE OF RIGHT FOOT (MULTI): Primary | ICD-10-CM

## 2025-08-11 ASSESSMENT — COLUMBIA-SUICIDE SEVERITY RATING SCALE - C-SSRS
1. IN THE PAST MONTH, HAVE YOU WISHED YOU WERE DEAD OR WISHED YOU COULD GO TO SLEEP AND NOT WAKE UP?: NO
2. HAVE YOU ACTUALLY HAD ANY THOUGHTS OF KILLING YOURSELF?: NO

## 2025-08-11 ASSESSMENT — ENCOUNTER SYMPTOMS
LOSS OF SENSATION IN FEET: 1
OCCASIONAL FEELINGS OF UNSTEADINESS: 1
DEPRESSION: 1

## 2025-08-11 ASSESSMENT — PATIENT HEALTH QUESTIONNAIRE - PHQ9
2. FEELING DOWN, DEPRESSED OR HOPELESS: SEVERAL DAYS
1. LITTLE INTEREST OR PLEASURE IN DOING THINGS: SEVERAL DAYS
SUM OF ALL RESPONSES TO PHQ9 QUESTIONS 1 AND 2: 2

## 2025-08-12 DIAGNOSIS — J44.9 CHRONIC OBSTRUCTIVE PULMONARY DISEASE, UNSPECIFIED COPD TYPE (MULTI): ICD-10-CM

## 2025-08-12 DIAGNOSIS — I50.32 CHRONIC DIASTOLIC (CONGESTIVE) HEART FAILURE: Primary | ICD-10-CM

## 2025-08-18 ENCOUNTER — APPOINTMENT (OUTPATIENT)
Dept: CARDIOLOGY | Facility: HOSPITAL | Age: 60
End: 2025-08-18
Payer: MEDICARE

## 2025-08-28 ENCOUNTER — OFFICE VISIT (OUTPATIENT)
Dept: PULMONOLOGY | Facility: HOSPITAL | Age: 60
End: 2025-08-28
Payer: MEDICARE

## 2025-08-28 VITALS
WEIGHT: 290 LBS | OXYGEN SATURATION: 97 % | RESPIRATION RATE: 16 BRPM | SYSTOLIC BLOOD PRESSURE: 106 MMHG | HEART RATE: 76 BPM | DIASTOLIC BLOOD PRESSURE: 67 MMHG | BODY MASS INDEX: 41.52 KG/M2 | HEIGHT: 70 IN

## 2025-08-28 DIAGNOSIS — G47.33 OSA (OBSTRUCTIVE SLEEP APNEA): ICD-10-CM

## 2025-08-28 DIAGNOSIS — Z87.891 FORMER CIGARETTE SMOKER: ICD-10-CM

## 2025-08-28 DIAGNOSIS — J44.9 CHRONIC OBSTRUCTIVE PULMONARY DISEASE, UNSPECIFIED COPD TYPE (MULTI): Primary | ICD-10-CM

## 2025-08-28 PROCEDURE — 3074F SYST BP LT 130 MM HG: CPT | Performed by: NURSE PRACTITIONER

## 2025-08-28 PROCEDURE — 99213 OFFICE O/P EST LOW 20 MIN: CPT

## 2025-08-28 PROCEDURE — 4010F ACE/ARB THERAPY RXD/TAKEN: CPT | Performed by: NURSE PRACTITIONER

## 2025-08-28 PROCEDURE — 3078F DIAST BP <80 MM HG: CPT | Performed by: NURSE PRACTITIONER

## 2025-08-28 PROCEDURE — 3008F BODY MASS INDEX DOCD: CPT | Performed by: NURSE PRACTITIONER

## 2025-08-28 PROCEDURE — 99214 OFFICE O/P EST MOD 30 MIN: CPT | Performed by: NURSE PRACTITIONER

## 2025-08-28 PROCEDURE — 1036F TOBACCO NON-USER: CPT | Performed by: NURSE PRACTITIONER

## 2025-08-28 RX ORDER — BUDESONIDE, GLYCOPYRROLATE, AND FORMOTEROL FUMARATE 160; 9; 4.8 UG/1; UG/1; UG/1
2 AEROSOL, METERED RESPIRATORY (INHALATION)
Qty: 10.7 G | Refills: 11 | Status: SHIPPED | OUTPATIENT
Start: 2025-08-28

## 2025-08-28 ASSESSMENT — ENCOUNTER SYMPTOMS
RHINORRHEA: 0
FEVER: 0
SHORTNESS OF BREATH: 1
CHILLS: 0
COUGH: 0
WHEEZING: 0
UNEXPECTED WEIGHT CHANGE: 0
FATIGUE: 0

## 2025-09-02 ENCOUNTER — APPOINTMENT (OUTPATIENT)
Dept: PRIMARY CARE | Facility: CLINIC | Age: 60
End: 2025-09-02
Payer: MEDICARE

## 2025-09-02 DIAGNOSIS — K92.0 HEMATEMESIS WITH NAUSEA: ICD-10-CM

## 2025-09-02 DIAGNOSIS — E11.49 OTHER DIABETIC NEUROLOGICAL COMPLICATION ASSOCIATED WITH TYPE 2 DIABETES MELLITUS: ICD-10-CM

## 2025-09-02 DIAGNOSIS — E11.65 TYPE 2 DIABETES MELLITUS WITH HYPERGLYCEMIA, WITHOUT LONG-TERM CURRENT USE OF INSULIN: ICD-10-CM

## 2025-09-02 ASSESSMENT — ENCOUNTER SYMPTOMS
DIARRHEA: 0
SLEEP DISTURBANCE: 0
ARTHRALGIAS: 0
FATIGUE: 0
POLYPHAGIA: 0
VOMITING: 0
DIZZINESS: 0
PALPITATIONS: 0
MYALGIAS: 0
POLYDIPSIA: 0
SHORTNESS OF BREATH: 0
CONSTIPATION: 0
ABDOMINAL PAIN: 0
NAUSEA: 0
HEADACHES: 0

## 2025-09-03 RX ORDER — OMEPRAZOLE 40 MG/1
40 CAPSULE, DELAYED RELEASE ORAL
Qty: 90 CAPSULE | Refills: 0 | Status: SHIPPED | OUTPATIENT
Start: 2025-09-03 | End: 2025-12-02

## 2025-09-03 RX ORDER — ACARBOSE 50 MG/1
50 TABLET ORAL
Qty: 270 TABLET | Refills: 0 | Status: SHIPPED | OUTPATIENT
Start: 2025-09-03 | End: 2025-12-02

## 2025-09-03 RX ORDER — GABAPENTIN 100 MG/1
100 CAPSULE ORAL 2 TIMES DAILY
Qty: 180 CAPSULE | Refills: 0 | Status: SHIPPED | OUTPATIENT
Start: 2025-09-03 | End: 2025-12-02

## 2025-09-03 RX ORDER — GLIPIZIDE 5 MG/1
5 TABLET ORAL
Qty: 180 TABLET | Refills: 0 | Status: SHIPPED | OUTPATIENT
Start: 2025-09-03 | End: 2025-12-02

## 2025-09-04 ENCOUNTER — HOSPITAL ENCOUNTER (OUTPATIENT)
Dept: CARDIOLOGY | Facility: HOSPITAL | Age: 60
Discharge: HOME | End: 2025-09-04
Payer: MEDICARE

## 2025-09-04 ENCOUNTER — TELEPHONE (OUTPATIENT)
Dept: PRIMARY CARE | Facility: CLINIC | Age: 60
End: 2025-09-04
Payer: MEDICARE

## 2025-09-04 DIAGNOSIS — I44.2 COMPLETE HEART BLOCK: ICD-10-CM

## 2025-09-04 DIAGNOSIS — Z95.0 PRESENCE OF CARDIAC PACEMAKER: Primary | ICD-10-CM

## 2025-09-04 PROCEDURE — 93280 PM DEVICE PROGR EVAL DUAL: CPT

## 2025-09-05 ENCOUNTER — APPOINTMENT (OUTPATIENT)
Dept: PHARMACY | Facility: HOSPITAL | Age: 60
End: 2025-09-05
Payer: MEDICARE

## 2025-09-08 ENCOUNTER — APPOINTMENT (OUTPATIENT)
Dept: CARDIOLOGY | Facility: HOSPITAL | Age: 60
End: 2025-09-08
Payer: MEDICARE

## 2025-09-12 ENCOUNTER — APPOINTMENT (OUTPATIENT)
Dept: PHARMACY | Facility: HOSPITAL | Age: 60
End: 2025-09-12
Payer: MEDICARE

## 2025-09-18 ENCOUNTER — APPOINTMENT (OUTPATIENT)
Dept: CARDIOLOGY | Facility: HOSPITAL | Age: 60
End: 2025-09-18
Payer: MEDICARE

## 2025-09-22 ENCOUNTER — APPOINTMENT (OUTPATIENT)
Dept: HEMATOLOGY/ONCOLOGY | Facility: CLINIC | Age: 60
End: 2025-09-22
Payer: MEDICARE

## 2025-09-23 ENCOUNTER — APPOINTMENT (OUTPATIENT)
Dept: PULMONOLOGY | Facility: HOSPITAL | Age: 60
End: 2025-09-23
Payer: MEDICARE

## 2025-09-24 ENCOUNTER — APPOINTMENT (OUTPATIENT)
Dept: SLEEP MEDICINE | Facility: CLINIC | Age: 60
End: 2025-09-24
Payer: MEDICARE

## 2025-12-02 ENCOUNTER — APPOINTMENT (OUTPATIENT)
Dept: PRIMARY CARE | Facility: CLINIC | Age: 60
End: 2025-12-02
Payer: MEDICARE

## 2026-08-27 ENCOUNTER — APPOINTMENT (OUTPATIENT)
Dept: PULMONOLOGY | Facility: HOSPITAL | Age: 61
End: 2026-08-27
Payer: MEDICARE

## (undated) DEVICE — BANDAGE, ELASTIC, PREMIUM, SELF-CLOSE, 4 IN X 5.5 YD, STERILE

## (undated) DEVICE — GLOVE, SURGICAL, PROTEXIS PI BLUE W/NEUTHERA, 8.0, PF, LF

## (undated) DEVICE — Device

## (undated) DEVICE — BANDAGE, GAUZE, CONFORMING, KERLIX, 6 PLY, 4.5 IN X 4.1 YD

## (undated) DEVICE — SYRINGE, 10 CC, LUER LOCK

## (undated) DEVICE — SUTURE, PROLENE, 2-0, 30 IN, SH, BLUE

## (undated) DEVICE — CATHETER, OPTITORQUE, 6FR, JACKY, BL3.5/2H/100CM

## (undated) DEVICE — TR BAND, RADIAL COMPRESSION, LONG, 29CM

## (undated) DEVICE — NEEDLE, HYPODERMIC, REGULAR WALL, REGULAR BEVEL, 18 G X 1.5 IN

## (undated) DEVICE — GUIDEWIRE, INQWIRE, 3MM J, .035 X 210CM, FIXED

## (undated) DEVICE — DRESSING, GAUZE, SPONGE, VERSALON, ALL PURPOSE, 4 X 4 IN, SOFT

## (undated) DEVICE — SOLUTION, IRRIGATION, SODIUM CHLORIDE 0.9%, 1000 ML, POUR BOTTLE

## (undated) DEVICE — PADDING, CAST, SOFT, 4 IN

## (undated) DEVICE — SUTURE, ETHILON, 3-0, 18 IN, PS1, BLACK

## (undated) DEVICE — CUFF, TOURNIQUET, 18 X 4, DUAL PORT/SNGL BLADDER, DISP, LF

## (undated) DEVICE — CATHETER, DIAGNOSTIC, DXTERITY, 5FR PIG145, 110CM.6 SH

## (undated) DEVICE — DRESSING, GAUZE, PETROLATUM, STRIP, XEROFORM, 1 X 8 IN, STERILE

## (undated) DEVICE — CATHETER, DIAGNOSTIC, DXTERITY, 6FR JR 4.0, 100CM

## (undated) DEVICE — PAD, GROUNDING, ELECTROSURGICAL, W/9 FT CABLE, POLYHESIVE II, ADULT, LF

## (undated) DEVICE — SHEATH, GLIDESHEATH, SLENDER, 6FR 10CM

## (undated) DEVICE — GLOVE, PROTEXIS PI CLASSIC, SZ-7.5, PF, LF

## (undated) DEVICE — COVER HANDLE LIGHT, STERIS, BLUE, STERILE

## (undated) DEVICE — DRESSING, ABDOMINAL, WET PRUF, TENDERSORB, 5 X 9 IN, STERILE